# Patient Record
Sex: FEMALE | Race: WHITE | Employment: FULL TIME | URBAN - METROPOLITAN AREA
[De-identification: names, ages, dates, MRNs, and addresses within clinical notes are randomized per-mention and may not be internally consistent; named-entity substitution may affect disease eponyms.]

---

## 2017-02-21 ENCOUNTER — TRANSFERRED RECORDS (OUTPATIENT)
Dept: HEALTH INFORMATION MANAGEMENT | Facility: CLINIC | Age: 57
End: 2017-02-21

## 2017-11-13 NOTE — PROGRESS NOTES
SUBJECTIVE:   CC: Missy Kirkpatrick is an 57 year old woman who presents for preventive health visit.     Answers for HPI/ROS submitted by the patient on 2017   Annual Exam:  Getting at least 3 servings of Calcium per day:: Yes  Bi-annual eye exam:: NO  Dental care twice a year:: Yes  Sleep apnea or symptoms of sleep apnea:: None  Frequency of exercise:: 2-3 days/week  Taking medications regularly:: Not Applicable  Medication side effects:: Not applicable  Additional concerns today:: YES  PHQ-2 Score: 0  Duration of exercise:: 30-45 minutes              Today's PHQ-2 Score:   PHQ-2 (  [employer]) 2017 10/30/2015   Q1: Little interest or pleasure in doing things 0 0   Q2: Feeling down, depressed or hopeless 0 0   PHQ-2 Score 0 0   Q1: Little interest or pleasure in doing things Not at all -   Q2: Feeling down, depressed or hopeless Not at all -   PHQ-2 Score 0 -         Abuse: Current or Past(Physical, Sexual or Emotional)- No  Do you feel safe in your environment - Yes  Social History   Substance Use Topics     Smoking status: Never Smoker     Smokeless tobacco: Never Used      Comment: Nonsmoking household     Alcohol use Yes      Comment: Occasionally     The patient does not drink >3 drinks per day nor >7 drinks per week.    Reviewed orders with patient.  Reviewed health maintenance and updated orders accordingly - Yes    G 3 P 3   Patient's last menstrual period was 10/19/2012.     Fasting: No   Td: tdap        Status post benign hysterectomy. Health Maintenance and Surgical History updated.     HPV: NA          Cholesterol:   Lab Results   Component Value Date    CHOL 208 10/21/2015     Lab Results   Component Value Date    HDL 66 10/21/2015     Lab Results   Component Value Date     10/21/2015     Lab Results   Component Value Date    TRIG 80 10/21/2015     Lab Results   Component Value Date    CHOLHDLRATIO 3.2 10/21/2015         MM/15  Dexa:  NA     Flex/colo: 12/10    Seat  Belt: Yes    Sunscreen use: Yes   Calcium Intake: adeq  Health Care Directive: No  Sexually Active: Yes     Current contraception: hysterectomy  History of abnormal Pap smear: No  Family history of colon/breast/ovarian cancer: No  Regular self breast exam: Yes  History of abnormal mammogram: No      Labs reviewed in EPIC  BP Readings from Last 3 Encounters:   11/14/17 118/81   08/17/16 120/78   06/30/16 110/76    Wt Readings from Last 3 Encounters:   11/14/17 230 lb (104.3 kg)   08/17/16 217 lb (98.4 kg)   06/30/16 208 lb (94.3 kg)                  Patient Active Problem List   Diagnosis     CARDIOVASCULAR SCREENING; LDL GOAL LESS THAN 160     Female stress incontinence     Factor 5 Leiden mutation, heterozygous (H)     Multiple thyroid nodules     S/P partial thyroidectomy     Advanced directives, counseling/discussion     Right rotator cuff tendonitis     Complete tear of right rotator cuff     Past Surgical History:   Procedure Laterality Date     ABDOMEN SURGERY  December 2012    Hysterectomy     BIOPSY  November 2012    utererus     C ANESTH,CLEFT PALATE REPAIR       C ANESTH,ELBOW,NOS  01/76     C REMOVE BENIGN THYROID LESION  01/2002    nodule removed      C TOTAL ABDOM HYSTERECTOMY  12/14/2012    pathology benign, no further paps needed     COLONOSCOPY  October 2012     ENT SURGERY  February 1994    Thyroid issue     ORTHOPEDIC SURGERY  01/1976    [place]     TUBAL LIGATION         Social History   Substance Use Topics     Smoking status: Never Smoker     Smokeless tobacco: Never Used      Comment: Nonsmoking household     Alcohol use Yes      Comment: Occasionally     Family History   Problem Relation Age of Onset     CEREBROVASCULAR DISEASE Mother      Eye Disorder Mother      glaucoma     HEART DISEASE Mother      DIABETES Mother      Hypertension Mother      Blood Disease Brother      DVT     CANCER Brother      Blood Disease Brother      DVT     Blood Disease Brother      DVT     Blood Disease  "Brother      DVT     Blood Disease Son 27     blood clots     Other Cancer Brother      Lung Cancer     DIABETES Brother      Obesity Brother      Bariatric surgery     DIABETES Brother      Hypertension Brother      Other Cancer Brother      Pancreatic Cancer         Current Outpatient Prescriptions   Medication Sig Dispense Refill     MULTIVITAMIN TABS   OR 1 TABLET DAILY             Patient over age 50, mutual decision to screen reflected in health maintenance.      Pertinent mammograms are reviewed under the imaging tab.      Reviewed and updated as needed this visit by clinical staff  Tobacco  Allergies  Meds  Problems  Med Hx  Surg Hx  Fam Hx  Soc Hx          Reviewed and updated as needed this visit by Provider  Allergies  Meds  Problems            ROS:  C: NEGATIVE for fever, chills, change in weight  I: NEGATIVE for worrisome rashes, moles or lesions  E: NEGATIVE for vision changes or irritation  ENT: NEGATIVE for ear, mouth and throat problems  R: NEGATIVE for significant cough or SOB  B: NEGATIVE for masses, tenderness or discharge  CV: NEGATIVE for chest pain, palpitations or peripheral edema  GI: NEGATIVE for nausea, abdominal pain, heartburn, or change in bowel habits  : NEGATIVE for unusual urinary or vaginal symptoms. No vaginal bleeding.  M: NEGATIVE for significant arthralgias or myalgia  N: NEGATIVE for weakness, dizziness or paresthesias  P: NEGATIVE for changes in mood or affect     OBJECTIVE:   /81  Pulse 69  Temp 96.6  F (35.9  C) (Oral)  Ht 5' 7.5\" (1.715 m)  Wt 230 lb (104.3 kg)  LMP 10/19/2012  SpO2 97%  BMI 35.49 kg/m2  EXAM:  GENERAL APPEARANCE: healthy, alert and no distress  EYES: Eyes grossly normal to inspection, PERRL and conjunctivae and sclerae normal  HENT: ear canals and TM's normal, nose and mouth without ulcers or lesions, oropharynx clear and oral mucous membranes moist  NECK: no adenopathy, no asymmetry, masses, or scars and thyroid normal to " palpation  RESP: lungs clear to auscultation - no rales, rhonchi or wheezes  BREAST: normal without masses, tenderness or nipple discharge and no palpable axillary masses or adenopathy  CV: regular rate and rhythm, normal S1 S2, no S3 or S4, no murmur, click or rub, no peripheral edema and peripheral pulses strong  ABDOMEN: soft, nontender, no hepatosplenomegaly, no masses and bowel sounds normal   (female): normal female external genitalia, normal urethral meatus, vaginal mucosal atrophy noted, normal skin, no lesions  MS: no musculoskeletal defects are noted and gait is age appropriate without ataxia  SKIN: no suspicious lesions or rashes  NEURO: Normal strength and tone, sensory exam grossly normal, mentation intact and speech normal  PSYCH: mentation appears normal and affect normal/bright    ASSESSMENT/PLAN:   (Z00.00) Routine general medical examination at a health care facility  (primary encounter diagnosis)  Comment: preventive needs reviewed  Plan: see orders in Epic.     (Z12.31) Visit for screening mammogram  Comment: due  Plan: MA SCREENING DIGITAL BILAT - Future  (s+30)            (Z11.59) Need for hepatitis C screening test  Comment: due  Plan: Hepatitis C Screen Reflex to HCV RNA Quant and         Genotype            (D68.51) Factor 5 Leiden mutation, heterozygous (H)  Comment: stable  Plan: not on meds    (D22.9) Atypical mole  Comment: multiple  Plan: DERMATOLOGY REFERRAL        Refer to derm for evaluation    (E04.2) Multiple thyroid nodules  Comment: h/o partial thyroidectomy  Plan: TSH with free T4 reflex              COUNSELING:   Reviewed preventive health counseling, as reflected in patient instructions  Special attention given to:        Regular exercise       Healthy diet/nutrition    BP Screening:   Last 3 BP Readings:    BP Readings from Last 3 Encounters:   11/14/17 118/81   08/17/16 120/78   06/30/16 110/76       The following was recommended to the patient:  Re-screen BP within a year  "and recommended lifestyle modifications     reports that she has never smoked. She has never used smokeless tobacco.    Estimated body mass index is 35.49 kg/(m^2) as calculated from the following:    Height as of this encounter: 5' 7.5\" (1.715 m).    Weight as of this encounter: 230 lb (104.3 kg).   Weight management plan: Discussed healthy diet and exercise guidelines and patient will follow up in 12 months in clinic to re-evaluate.    Counseling Resources:  ATP IV Guidelines  Pooled Cohorts Equation Calculator  Breast Cancer Risk Calculator  FRAX Risk Assessment  ICSI Preventive Guidelines  Dietary Guidelines for Americans, 2010  USDA's MyPlate  ASA Prophylaxis  Lung CA Screening    Shell Osuna MD  [facility]  "

## 2017-11-14 ENCOUNTER — OFFICE VISIT (OUTPATIENT)
Dept: FAMILY MEDICINE | Facility: CLINIC | Age: 57
End: 2017-11-14
Payer: COMMERCIAL

## 2017-11-14 VITALS
SYSTOLIC BLOOD PRESSURE: 118 MMHG | DIASTOLIC BLOOD PRESSURE: 81 MMHG | HEART RATE: 69 BPM | BODY MASS INDEX: 34.86 KG/M2 | WEIGHT: 230 LBS | OXYGEN SATURATION: 97 % | HEIGHT: 68 IN | TEMPERATURE: 96.6 F

## 2017-11-14 DIAGNOSIS — D68.51 FACTOR 5 LEIDEN MUTATION, HETEROZYGOUS (H): ICD-10-CM

## 2017-11-14 DIAGNOSIS — Z00.00 ROUTINE GENERAL MEDICAL EXAMINATION AT A HEALTH CARE FACILITY: Primary | ICD-10-CM

## 2017-11-14 DIAGNOSIS — Z12.31 VISIT FOR SCREENING MAMMOGRAM: ICD-10-CM

## 2017-11-14 DIAGNOSIS — Z11.59 NEED FOR HEPATITIS C SCREENING TEST: ICD-10-CM

## 2017-11-14 DIAGNOSIS — E04.2 MULTIPLE THYROID NODULES: ICD-10-CM

## 2017-11-14 DIAGNOSIS — D22.9 ATYPICAL MOLE: ICD-10-CM

## 2017-11-14 LAB — TSH SERPL DL<=0.005 MIU/L-ACNC: 0.86 MU/L (ref 0.4–4)

## 2017-11-14 PROCEDURE — 86803 HEPATITIS C AB TEST: CPT | Performed by: FAMILY MEDICINE

## 2017-11-14 PROCEDURE — 84443 ASSAY THYROID STIM HORMONE: CPT | Performed by: FAMILY MEDICINE

## 2017-11-14 PROCEDURE — 99396 PREV VISIT EST AGE 40-64: CPT | Performed by: FAMILY MEDICINE

## 2017-11-14 PROCEDURE — 36415 COLL VENOUS BLD VENIPUNCTURE: CPT | Performed by: FAMILY MEDICINE

## 2017-11-14 NOTE — MR AVS SNAPSHOT
After Visit Summary   11/14/2017    Missy Kirkpatrick    MRN: [id number]           Patient Information     Date Of Birth          1960        Visit Information        Provider Department      11/14/2017 11:35 AM Shell Osuna MD [facility]        Today's Diagnoses     Routine general medical examination at a health care facility    -  1    Visit for screening mammogram        Need for hepatitis C screening test        Factor 5 Leiden mutation, heterozygous (H)        Atypical mole        Multiple thyroid nodules          Care Instructions      Preventive Health Recommendations  Female Ages 50 - 64    Yearly exam: See your health care provider every year in order to  o Review health changes.   o Discuss preventive care.    o Review your medicines if your doctor has prescribed any.      Get a Pap test every three years (unless you have an abnormal result and your provider advises testing more often).    If you get Pap tests with HPV test, you only need to test every 5 years, unless you have an abnormal result.     You do not need a Pap test if your uterus was removed (hysterectomy) and you have not had cancer.    You should be tested each year for STDs (sexually transmitted diseases) if you're at risk.     Have a mammogram every 1 to 2 years.    Have a colonoscopy at age 50, or have a yearly FIT test (stool test). These exams screen for colon cancer.      Have a cholesterol test every 5 years, or more often if advised.    Have a diabetes test (fasting glucose) every three years. If you are at risk for diabetes, you should have this test more often.     If you are at risk for osteoporosis (brittle bone disease), think about having a bone density scan (DEXA).    Shots: Get a flu shot each year. Get a tetanus shot every 10 years.    Nutrition:     Eat at least 5 servings of fruits and vegetables each day.    Eat whole-grain bread, whole-wheat pasta and brown rice instead of  white grains and rice.    Talk to your provider about Calcium and Vitamin D.     Lifestyle    Exercise at least 150 minutes a week (30 minutes a day, 5 days a week). This will help you control your weight and prevent disease.    Limit alcohol to one drink per day.    No smoking.     Wear sunscreen to prevent skin cancer.     See your dentist every six months for an exam and cleaning.    See your eye doctor every 1 to 2 years.            Follow-ups after your visit        Additional Services     DERMATOLOGY REFERRAL       Your provider has referred you to: [place]: [facility] [phone]   [email or web address]    Please be aware that coverage of these services is subject to the terms and limitations of your health insurance plan.  Call member services at your health plan with any benefit or coverage questions.      Please bring the following with you to your appointment:    (1) Any X-Rays, CTs or MRIs which have been performed.  Contact the facility where they were done to arrange for  prior to your scheduled appointment.    (2) List of current medications  (3) This referral request   (4) Any documents/labs given to you for this referral                  Future tests that were ordered for you today     Open Future Orders        Priority Expected Expires Ordered    MA SCREENING DIGITAL BILAT - Future  (s+30) Routine  11/13/2018 11/14/2017            Who to contact     If you have questions or need follow up information about today's clinic visit or your schedule please contact [facility] directly at [phone].  Normal or non-critical lab and imaging results will be communicated to you by MyChart, letter or phone within 4 business days after the clinic has received the results. If you do not hear from us within 7 days, please contact the clinic through MyChart or phone. If you have a critical or abnormal lab result, we will notify you by phone as  "soon as possible.  Submit refill requests through [employer] or call your pharmacy and they will forward the refill request to us. Please allow 3 business days for your refill to be completed.          Additional Information About Your Visit        Social[employer] Information     [employer] gives you secure access to your electronic health record. If you see a primary care provider, you can also send messages to your care team and make appointments. If you have questions, please call your primary care clinic.  If you do not have a primary care provider, please call [phone] and they will assist you.        Care EveryWhere ID     This is your Care EveryWhere ID. This could be used by other organizations to access your [place] medical records  [id number]        Your Vitals Were     Pulse Temperature Height Last Period Pulse Oximetry BMI (Body Mass Index)    69 96.6  F (35.9  C) (Oral) 5' 7.5\" (1.715 m) 10/19/2012 97% 35.49 kg/m2       Blood Pressure from Last 3 Encounters:   11/14/17 118/81   08/17/16 120/78   06/30/16 110/76    Weight from Last 3 Encounters:   11/14/17 230 lb (104.3 kg)   08/17/16 217 lb (98.4 kg)   06/30/16 208 lb (94.3 kg)              We Performed the Following     DERMATOLOGY REFERRAL     Hepatitis C Screen Reflex to HCV RNA Quant and Genotype     TSH with free T4 reflex        Primary Care Provider Office Phone # Fax #    Shell Adriane Osuna [phone] [address] [facility] [address]        Equal Access to Services     [facility] LAMINE : Hadii aad ku hadasho Soomaali, waaxda luqadaha, qaybta kaalmada adeegyada, raudel bradshaw. So [facility] [phone].    ATENCIÓN: Si habla chrisañol, tiene a mars disposición servicios gratuitos de asistencia lingüística. Llame al [phone].    We comply with applicable federal civil rights laws and [place] laws. We do not discriminate on the basis of race, color, national origin, age, disability, sex, sexual " orientation, or gender identity.            Thank you!     [facility] you [facility]  [facility] your [facility]. Our goal is always to provide you with excellent care. Hearing back from our patients is one way we can continue to improve our services. Please take a few minutes to complete the written survey that you may receive in the mail after your visit with us. Thank you!             Your Updated Medication List - Protect others around you: Learn how to safely use, store and throw away your medicines at www.disposemymeds.org.          This list is accurate as of: 11/14/17 12:00 PM.  Always use your most recent med list.                   Brand Name Dispense Instructions for use Diagnosis    MULTIVITAMIN TABS   OR      1 TABLET DAILY

## 2017-11-14 NOTE — NURSING NOTE
"Chief Complaint   Patient presents with     Physical       Initial /81  Pulse 69  Temp 96.6  F (35.9  C) (Oral)  Ht 5' 7.5\" (1.715 m)  Wt 230 lb (104.3 kg)  LMP 10/19/2012  SpO2 97%  BMI 35.49 kg/m2 Estimated body mass index is 35.49 kg/(m^2) as calculated from the following:    Height as of this encounter: 5' 7.5\" (1.715 m).    Weight as of this encounter: 230 lb (104.3 kg).  Medication Reconciliation: complete   Idalmis CHEN CMA (AAMA)      "

## 2017-11-15 LAB — HCV AB SERPL QL IA: NONREACTIVE

## 2017-12-05 ENCOUNTER — RADIANT APPOINTMENT (OUTPATIENT)
Dept: MAMMOGRAPHY | Facility: CLINIC | Age: 57
End: 2017-12-05
Attending: FAMILY MEDICINE
Payer: COMMERCIAL

## 2017-12-05 DIAGNOSIS — Z12.31 VISIT FOR SCREENING MAMMOGRAM: ICD-10-CM

## 2017-12-05 PROCEDURE — G0202 SCR MAMMO BI INCL CAD: HCPCS | Mod: TC

## 2018-03-20 ENCOUNTER — OFFICE VISIT (OUTPATIENT)
Dept: FAMILY MEDICINE | Facility: CLINIC | Age: 58
End: 2018-03-20
Payer: COMMERCIAL

## 2018-03-20 ENCOUNTER — RADIANT APPOINTMENT (OUTPATIENT)
Dept: GENERAL RADIOLOGY | Facility: CLINIC | Age: 58
End: 2018-03-20
Attending: FAMILY MEDICINE
Payer: COMMERCIAL

## 2018-03-20 ENCOUNTER — RADIANT APPOINTMENT (OUTPATIENT)
Dept: MRI IMAGING | Facility: CLINIC | Age: 58
End: 2018-03-20
Attending: FAMILY MEDICINE
Payer: COMMERCIAL

## 2018-03-20 VITALS
HEIGHT: 68 IN | OXYGEN SATURATION: 96 % | HEART RATE: 74 BPM | BODY MASS INDEX: 33.19 KG/M2 | SYSTOLIC BLOOD PRESSURE: 129 MMHG | TEMPERATURE: 96.7 F | DIASTOLIC BLOOD PRESSURE: 81 MMHG | RESPIRATION RATE: 14 BRPM | WEIGHT: 219 LBS

## 2018-03-20 DIAGNOSIS — M25.562 LEFT KNEE PAIN, UNSPECIFIED CHRONICITY: Primary | ICD-10-CM

## 2018-03-20 DIAGNOSIS — M25.562 LEFT KNEE PAIN, UNSPECIFIED CHRONICITY: ICD-10-CM

## 2018-03-20 DIAGNOSIS — D68.51 FACTOR 5 LEIDEN MUTATION, HETEROZYGOUS (H): ICD-10-CM

## 2018-03-20 PROCEDURE — 73562 X-RAY EXAM OF KNEE 3: CPT | Mod: LT

## 2018-03-20 PROCEDURE — 73721 MRI JNT OF LWR EXTRE W/O DYE: CPT | Mod: TC

## 2018-03-20 PROCEDURE — 99213 OFFICE O/P EST LOW 20 MIN: CPT | Performed by: FAMILY MEDICINE

## 2018-03-20 ASSESSMENT — PAIN SCALES - GENERAL: PAINLEVEL: MODERATE PAIN (5)

## 2018-03-20 NOTE — PROGRESS NOTES
SUBJECTIVE:   Missy Kirkpatrick is a 57 year old female who presents to clinic today for the following health issues:        Musculoskeletal problem/pain      Duration: 3-4 weeks     Description  Location: left knee    Intensity:  moderate    Accompanying signs and symptoms: swelling    History  Previous similar problem: no   Previous evaluation:  none    Precipitating or alleviating factors:  Trauma or overuse: no   Aggravating factors include: sitting, walking, climbing stairs and kneeling    Therapies tried and outcome: rest/inactivity, ice and Ibuprofen      No injury or triggering event but has significantly increased over past 3-4 weeks, has had knee pain x 6 months.  Feels tight, cannot fully bend or extend without pain.    Problem list and histories reviewed & adjusted, as indicated.  Additional history: as documented    Patient Active Problem List   Diagnosis     CARDIOVASCULAR SCREENING; LDL GOAL LESS THAN 160     Female stress incontinence     Factor 5 Leiden mutation, heterozygous (H)     Multiple thyroid nodules     S/P partial thyroidectomy     Advanced directives, counseling/discussion     Right rotator cuff tendonitis     Complete tear of right rotator cuff     Past Surgical History:   Procedure Laterality Date     ABDOMEN SURGERY  December 2012    Hysterectomy     BIOPSY  November 2012    utererus     C ANESTH,CLEFT PALATE REPAIR       C ANESTH,ELBOW,NOS  01/76     C REMOVE BENIGN THYROID LESION  01/2002    nodule removed      C TOTAL ABDOM HYSTERECTOMY  12/14/2012    pathology benign, no further paps needed     COLONOSCOPY  October 2012     ENT SURGERY  February 1994    Thyroid issue     ORTHOPEDIC SURGERY  01/1976    [place]     TUBAL LIGATION         Social History   Substance Use Topics     Smoking status: Never Smoker     Smokeless tobacco: Never Used      Comment: Nonsmoking household     Alcohol use Yes      Comment: Occasionally     Family History   Problem Relation Age of Onset      "CEREBROVASCULAR DISEASE Mother      Eye Disorder Mother      glaucoma     HEART DISEASE Mother      DIABETES Mother      Hypertension Mother      Blood Disease Brother      DVT     CANCER Brother      Blood Disease Brother      DVT     Blood Disease Brother      DVT     Blood Disease Brother      DVT     Blood Disease Son 27     blood clots     Other Cancer Brother      Lung Cancer     DIABETES Brother      Obesity Brother      Bariatric surgery     DIABETES Brother      Hypertension Brother      Other Cancer Brother      Pancreatic Cancer         Current Outpatient Prescriptions   Medication Sig Dispense Refill     MULTIVITAMIN TABS   OR 1 TABLET DAILY       BP Readings from Last 3 Encounters:   03/20/18 129/81   11/14/17 118/81   08/17/16 120/78    Wt Readings from Last 3 Encounters:   03/20/18 219 lb (99.3 kg)   11/14/17 230 lb (104.3 kg)   08/17/16 217 lb (98.4 kg)                  Labs reviewed in EPIC    Reviewed and updated as needed this visit by clinical staff  Tobacco  Allergies  Meds  Problems  Med Hx  Surg Hx  Fam Hx  Soc Hx        Reviewed and updated as needed this visit by Provider  Allergies  Meds  Problems         ROS:  Constitutional, HEENT, cardiovascular, pulmonary, gi and gu systems are negative, except as otherwise noted.    OBJECTIVE:     /81  Pulse 74  Temp 96.7  F (35.9  C) (Oral)  Resp 14  Ht 5' 8\" (1.727 m)  Wt 219 lb (99.3 kg)  LMP 10/19/2012  SpO2 96%  BMI 33.3 kg/m2  Body mass index is 33.3 kg/(m^2).  GENERAL: healthy, alert and no distress  EYES: Eyes grossly normal to inspection, PERRL and conjunctivae and sclerae normal  MS: extremities normal- no gross deformities noted, left knee with effusion, extension to only 140, flexion to 90, + [facility] medial joint line.  Gait abnormal due to limp from pain.  SKIN: no suspicious lesions or rashes  PSYCH: mentation appears normal, affect normal/bright    Diagnostic Test Results:  Xray left knee: joint space perserved, " + DJD patello/femoral compartment    ASSESSMENT/PLAN:     (M25.562) Left knee pain, unspecified chronicity  (primary encounter diagnosis)  Comment: likely meniscal damag3  Plan: MR Knee Left w/o Contrast, CANCELED: XR Knee         Standing Left 2 Views, CANCELED: XR Knee Left         1/2 Views        Refer for MRI, will send to ortho based on results    (D68.51) Factor 5 Leiden mutation, heterozygous (H)  Comment: no symptoms to suggest DVT  Plan: no anticoagulation needed    See Patient Instructions    Shell Osuna MD  [facility]

## 2018-03-20 NOTE — MR AVS SNAPSHOT
After Visit Summary   3/20/2018    Missy iKrkpatrick    MRN: [id number]           Patient Information     Date Of Birth          1960        Visit Information        Provider Department      3/20/2018 11:55 AM Shell Osuna MD [facility] [place]        Today's Diagnoses     Left knee pain, unspecified chronicity    -  1      Care Instructions      Please  call [phone] to schedule your MRI of left knee.           Follow-ups after your visit        Future tests that were ordered for you today     Open Future Orders        Priority Expected Expires Ordered    MR Knee Left w/o Contrast Routine  3/20/2019 3/20/2018            Who to contact     If you have questions or need follow up information about today's clinic visit or your schedule please contact [facility] directly at [phone].  Normal or non-critical lab and imaging results will be communicated to you by MyChart, letter or phone within 4 business days after the clinic has received the results. If you do not hear from us within 7 days, please contact the clinic through MyChart or phone. If you have a critical or abnormal lab result, we will notify you by phone as soon as possible.  Submit refill requests through [employer] or call your pharmacy and they will forward the refill request to us. Please allow 3 business days for your refill to be completed.          Additional Information About Your Visit        MyChart Information     [employer] gives you secure access to your electronic health record. If you see a primary care provider, you can also send messages to your care team and make appointments. If you have questions, please call your primary care clinic.  If you do not have a primary care provider, please call [phone] and they will assist you.        Care EveryWhere ID     This is your Care EveryWhere ID. This could be used by other organizations to access your [place] medical records  [id number]    "     Your Vitals Were     Pulse Temperature Respirations Height Last Period Pulse Oximetry    74 96.7  F (35.9  C) (Oral) 14 5' 8\" (1.727 m) 10/19/2012 96%    BMI (Body Mass Index)                   33.3 kg/m2            Blood Pressure from Last 3 Encounters:   03/20/18 129/81   11/14/17 118/81   08/17/16 120/78    Weight from Last 3 Encounters:   03/20/18 219 lb (99.3 kg)   11/14/17 230 lb (104.3 kg)   08/17/16 217 lb (98.4 kg)               Primary Care Provider Office Phone # Fax #    Shell Adriane Osuna [phone] [id number] [facility] [address]        Equal Access to Services     ENRIQUE RAMAN : Hadii aad ku hadasho Soomaali, waaxda luqadaha, qaybta kaalmada adeegyada, raudel oh . So [facility] [phone].    ATENCIÓN: Si habla español, tiene a mars disposición servicios gratuitos de asistencia lingüística. [facility] [phone].    We comply with applicable federal civil rights laws and [place] laws. We do not discriminate on the basis of race, color, national origin, age, disability, sex, sexual orientation, or gender identity.            Thank you!     Thank you for choosing [facility]  for your care. Our goal is always to provide you with excellent care. Hearing back from our patients is one way we can continue to improve our services. Please take a few minutes to complete the written survey that you may receive in the mail after your visit with us. Thank you!             Your Updated Medication List - Protect others around you: Learn how to safely use, store and throw away your medicines at www.disposemymeds.org.          This list is accurate as of 3/20/18 12:42 PM.  Always use your most recent med list.                   Brand Name Dispense Instructions for use Diagnosis    MULTIVITAMIN TABS   OR      1 TABLET DAILY          "

## 2018-03-20 NOTE — NURSING NOTE
"Chief Complaint   Patient presents with     Knee Pain     3 - 4 weeks left knee       Initial /81  Pulse 74  Temp 96.7  F (35.9  C) (Oral)  Resp 14  Ht 5' 8\" (1.727 m)  Wt 219 lb (99.3 kg)  LMP 10/19/2012  SpO2 96%  BMI 33.3 kg/m2 Estimated body mass index is 33.3 kg/(m^2) as calculated from the following:    Height as of this encounter: 5' 8\" (1.727 m).    Weight as of this encounter: 219 lb (99.3 kg).  Medication Reconciliation: complete  Josette Lombardo M.A.    "

## 2018-03-28 NOTE — PROGRESS NOTES
HISTORY OF PRESENT ILLNESS    Missy Kirkpatrick is a 57 year old female seen at the request of Shell Osuna MD for evaluation of left knee pain that started 2 months ago.  She is unsure if it started  while doing yoga.  She remembers feeling a subtle pop in her knee while doing yoga.   Present symptoms: She has pain sitting and driving.  Pain walking through the snow.  Pain with volleyball particularly after a strenuous volleyball game.  Getting in and out of chairs is painful.  Sitting for long periods is painful.  She reports swelling in the knee and gets locking occasionally moving around in bed at night.  The pain is medial usually    Orthopedic PMH: none    Other PMH: See patient history on [facility].  Past Medical History:   Diagnosis Date     Arthritis      Factor 5 Leiden mutation, heterozygous (H)      Factor 5 Leiden mutation, heterozygous (H)      Factor 5 Leiden mutation, heterozygous (H)      Hyperlipidemia LDL goal < 100      Morbid obesity (H)      Thyroid disease February 1994    Precancerous Thyroid - 1/2 removed     Past Surgical History:   Procedure Laterality Date     ABDOMEN SURGERY  December 2012    Hysterectomy     BIOPSY  November 2012    utererus     C ANESTH,CLEFT PALATE REPAIR       C ANESTH,ELBOW,NOS  01/76     C REMOVE BENIGN THYROID LESION  01/2002    nodule removed      C TOTAL ABDOM HYSTERECTOMY  12/14/2012    pathology benign, no further paps needed     COLONOSCOPY  October 2012     ENT SURGERY  February 1994    Thyroid issue     ORTHOPEDIC SURGERY  01/1976    Broken [place]     TUBAL LIGATION       Family History   Problem Relation Age of Onset     CEREBROVASCULAR DISEASE Mother      Eye Disorder Mother      glaucoma     HEART DISEASE Mother      DIABETES Mother      Hypertension Mother      Blood Disease Brother      DVT     CANCER Brother      Blood Disease Brother      DVT     Blood Disease Brother      DVT     Blood Disease Brother      DVT     Blood Disease Son 27     blood  clots     Other Cancer Brother      Lung Cancer     DIABETES Brother      Obesity Brother      Bariatric surgery     DIABETES Brother      Hypertension Brother      Other Cancer Brother      Pancreatic Cancer     Social History     Social History     Marital status:      Spouse name: N/A     Number of children: N/A     Years of education: N/A     Occupational History     Not on file.     Social History Main Topics     Smoking status: Never Smoker     Smokeless tobacco: Never Used      Comment: Nonsmoking household     Alcohol use Yes      Comment: Occasionally     Drug use: No     Sexual activity: Yes     Partners: Male     Birth control/ protection: Female Surgical      Comment: tubal ligation     Other Topics Concern     Parent/Sibling W/ Cabg, Mi Or Angioplasty Before 65f 55m? Yes     Brother     Social History Narrative        REVIEW OF SYSTEMS:    CONSTITUTIONAL:  NEGATIVE for fever, chills, change in weight  INTEGUMENTARY/SKIN:  NEGATIVE for worrisome rashes, moles or lesions  EYES:  NEGATIVE for vision changes or irritation  ENT/MOUTH:  NEGATIVE for ear, mouth and throat problems  RESP:  NEGATIVE for significant cough or SOB  BREAST:  NEGATIVE for masses, tenderness or discharge  CV:  NEGATIVE for chest pain, palpitations or peripheral edema  GI:  NEGATIVE for nausea, abdominal pain, heartburn, or change in bowel habits  :  Negative   MUSCULOSKELETAL:  See HPI above  NEURO:  NEGATIVE for weakness, dizziness or paresthesias  ENDOCRINE:  NEGATIVE for temperature intolerance, skin/hair changes  HEME/ALLERGY/IMMUNE:  NEGATIVE for bleeding problems  PSYCHIATRIC:  NEGATIVE for changes in mood or affect      PHYSICAL EXAM:    GENERAL APPEARANCE: healthy, alert and no distress   SKIN: no suspicious lesions or rashes  NEURO: Normal strength and tone, mentation intact and speech normal  PSYCH:  mentation appears normal and affect normal/bright  RESP: No increased work of breathing  LYMPH:  No  lymphedema  PULSES:  Intact.    KNEE EXAM:   Gait: walks with normal gait  Alignment: Normal  Squat: 50 % limited by pain.    mild crepitations in the patellofemoral joint.  Tender: medial joint line and medial facet of the patella  ROM: 0-110*  Effusion: mild    McMurrays: negative  Ligaments:    Lachman's Stable, Anterior and posterior drawer stable, stable to varus and valgus stress.  no pain with Varus/Valgus stress testing.    Patellofemoral joint:      Lateral retinaculum is not tight   Facet Tenderness: medial   Apprehension: negative    X-RAY:  From 3/20/2018: Significant loss of patellofemoral compartment joint space,  increased in comparison with 2015. Tibiofemoral compartment joint  space is preserved with minimal medial joint space narrowing.. Small patellofemoral marginal osteophytes    MRI: shows 1. Tearing of the posterior horn medial meniscus with mild  perimeniscal cyst formation.  2. Medial compartment and patellofemoral chondromalacia.  3. Mild-moderate effusion.  4. Minimal semimembranosus bursitis.she      Impression: medial meniscus tear some questionable mechanical symptoms.  Negative mechanical signs on exam.  Osteoarthritis mainly involving the patellofemoral joint.      Plan: We discussed the options which were basically knee arthroscopy versus steroid injection.  She elected to try a steroid injection after long discussion about the pros and cons of each.  In the arthroscopy could be done if the injection is not particularly successful in helping her symptoms.  With the patient's consent, left knee(s) injected intra-articularly with 80mg of Depomedrol and 4cc of local anesthetic after sterile prep.    Return to clinic as needed       APRIL Faustin MD  Dept. Orthopedic Surgery  [facility]

## 2018-04-02 ENCOUNTER — OFFICE VISIT (OUTPATIENT)
Dept: ORTHOPEDICS | Facility: CLINIC | Age: 58
End: 2018-04-02
Payer: COMMERCIAL

## 2018-04-02 VITALS
HEART RATE: 62 BPM | WEIGHT: 219 LBS | BODY MASS INDEX: 33.3 KG/M2 | DIASTOLIC BLOOD PRESSURE: 74 MMHG | SYSTOLIC BLOOD PRESSURE: 104 MMHG

## 2018-04-02 DIAGNOSIS — M17.12 PRIMARY OSTEOARTHRITIS OF LEFT KNEE: ICD-10-CM

## 2018-04-02 DIAGNOSIS — S83.242A TEAR OF MEDIAL MENISCUS OF LEFT KNEE, CURRENT, UNSPECIFIED TEAR TYPE, INITIAL ENCOUNTER: Primary | ICD-10-CM

## 2018-04-02 PROCEDURE — 99213 OFFICE O/P EST LOW 20 MIN: CPT | Mod: 25 | Performed by: ORTHOPAEDIC SURGERY

## 2018-04-02 PROCEDURE — 20610 DRAIN/INJ JOINT/BURSA W/O US: CPT | Mod: LT | Performed by: ORTHOPAEDIC SURGERY

## 2018-04-02 NOTE — LETTER
4/2/2018         RE: Missy Kirkpatrick  [address]  [place] [address]        Dear Colleague,    Thank you for referring your patient, Missy Kirkpatrick, to the [facility]. Please see a copy of my visit note below.    HISTORY OF PRESENT ILLNESS    Missy Kirkpatrick is a 57 year old female seen at the request of Shell Osuna MD for evaluation of left knee pain that started 2 months ago.  She is unsure if it started  while doing yoga.  She remembers feeling a subtle pop in her knee while doing yoga.   Present symptoms: She has pain sitting and driving.  Pain walking through the snow.  Pain with volleyball particularly after a strenuous volleyball game.  Getting in and out of chairs is painful.  Sitting for long periods is painful.  She reports swelling in the knee and gets locking occasionally moving around in bed at night.  The pain is medial usually    Orthopedic PMH: none    Other PMH: See patient history on [facility].  Past Medical History:   Diagnosis Date     Arthritis      Factor 5 Leiden mutation, heterozygous (H)      Factor 5 Leiden mutation, heterozygous (H)      Factor 5 Leiden mutation, heterozygous (H)      Hyperlipidemia LDL goal < 100      Morbid obesity (H)      Thyroid disease February 1994    Precancerous Thyroid - 1/2 removed     Past Surgical History:   Procedure Laterality Date     ABDOMEN SURGERY  December 2012    Hysterectomy     BIOPSY  November 2012    utererus     C ANESTH,CLEFT PALATE REPAIR       C ANESTH,ELBOW,NOS  01/76     C REMOVE BENIGN THYROID LESION  01/2002    nodule removed      C TOTAL ABDOM HYSTERECTOMY  12/14/2012    pathology benign, no further paps needed     COLONOSCOPY  October 2012     ENT SURGERY  February 1994    Thyroid issue     ORTHOPEDIC SURGERY  01/1976    Bro[place]     TUBAL LIGATION       Family History   Problem Relation Age of Onset     CEREBROVASCULAR DISEASE Mother      Eye Disorder Mother      glaucoma     HEART DISEASE  Mother      DIABETES Mother      Hypertension Mother      Blood Disease Brother      DVT     CANCER Brother      Blood Disease Brother      DVT     Blood Disease Brother      DVT     Blood Disease Brother      DVT     Blood Disease Son 27     blood clots     Other Cancer Brother      Lung Cancer     DIABETES Brother      Obesity Brother      Bariatric surgery     DIABETES Brother      Hypertension Brother      Other Cancer Brother      Pancreatic Cancer     Social History     Social History     Marital status:      Spouse name: N/A     Number of children: N/A     Years of education: N/A     Occupational History     Not on file.     Social History Main Topics     Smoking status: Never Smoker     Smokeless tobacco: Never Used      Comment: Nonsmoking household     Alcohol use Yes      Comment: Occasionally     Drug use: No     Sexual activity: Yes     Partners: Male     Birth control/ protection: Female Surgical      Comment: tubal ligation     Other Topics Concern     Parent/Sibling W/ Cabg, Mi Or Angioplasty Before 65f 55m? Yes     Brother     Social History Narrative        REVIEW OF SYSTEMS:    CONSTITUTIONAL:  NEGATIVE for fever, chills, change in weight  INTEGUMENTARY/SKIN:  NEGATIVE for worrisome rashes, moles or lesions  EYES:  NEGATIVE for vision changes or irritation  ENT/MOUTH:  NEGATIVE for ear, mouth and throat problems  RESP:  NEGATIVE for significant cough or SOB  BREAST:  NEGATIVE for masses, tenderness or discharge  CV:  NEGATIVE for chest pain, palpitations or peripheral edema  GI:  NEGATIVE for nausea, abdominal pain, heartburn, or change in bowel habits  :  Negative   MUSCULOSKELETAL:  See HPI above  NEURO:  NEGATIVE for weakness, dizziness or paresthesias  ENDOCRINE:  NEGATIVE for temperature intolerance, skin/hair changes  HEME/ALLERGY/IMMUNE:  NEGATIVE for bleeding problems  PSYCHIATRIC:  NEGATIVE for changes in mood or affect      PHYSICAL EXAM:    GENERAL APPEARANCE: healthy, alert  and no distress   SKIN: no suspicious lesions or rashes  NEURO: Normal strength and tone, mentation intact and speech normal  PSYCH:  mentation appears normal and affect normal/bright  RESP: No increased work of breathing  LYMPH:  No lymphedema  PULSES:  Intact.    KNEE EXAM:   Gait: walks with normal gait  Alignment: Normal  Squat: 50 % limited by pain.    mild crepitations in the patellofemoral joint.  Tender: medial joint line and medial facet of the patella  ROM: 0-110*  Effusion: mild    McMurrays: negative  Ligaments:    Lachman's Stable, Anterior and posterior drawer stable, stable to varus and valgus stress.  no pain with Varus/Valgus stress testing.    Patellofemoral joint:      Lateral retinaculum is not tight   Facet Tenderness: medial   Apprehension: negative    X-RAY:  From 3/20/2018: Significant loss of patellofemoral compartment joint space,  increased in comparison with 2015. Tibiofemoral compartment joint  space is preserved with minimal medial joint space narrowing.. Small patellofemoral marginal osteophytes    MRI: shows 1. Tearing of the posterior horn medial meniscus with mild  perimeniscal cyst formation.  2. Medial compartment and patellofemoral chondromalacia.  3. Mild-moderate effusion.  4. Minimal semimembranosus bursitis.she      Impression: medial meniscus tear some questionable mechanical symptoms.  Negative mechanical signs on exam.  Osteoarthritis mainly involving the patellofemoral joint.      Plan: We discussed the options which were basically knee arthroscopy versus steroid injection.  She elected to try a steroid injection after long discussion about the pros and cons of each.  In the arthroscopy could be done if the injection is not particularly successful in helping her symptoms.  With the patient's consent, left knee(s) injected intra-articularly with 80mg of Depomedrol and 4cc of local anesthetic after sterile prep.    Return to clinic as needed       APRIL Faustin  MD  Dept. Orthopedic Surgery  [facility]      Again, thank you for allowing me to participate in the care of your patient.        Sincerely,        Pavan Faustin MD

## 2018-04-02 NOTE — MR AVS SNAPSHOT
After Visit Summary   4/2/2018    Missy Kirkpatrick    MRN: [id number]           Patient Information     Date Of Birth          1960        Visit Information        Provider Department      4/2/2018 10:00 AM Pavan Faustin MD [facility]        Today's Diagnoses     Tear of medial meniscus of left knee, current, unspecified tear type, initial encounter    -  1    Primary osteoarthritis of left knee           Follow-ups after your visit        Your next 10 appointments already scheduled     Apr 05, 2018  1:00 PM CDT   Return Visit with Pavan Faustin MD   [facility] ([facility])    [address] [facility] [address] [phone]              Who to contact     If you have questions or need follow up information about today's clinic visit or your schedule please contact [facility] directly at [phone].  Normal or non-critical lab and imaging results will be communicated to you by MyChart, letter or phone within 4 business days after the clinic has received the results. If you do not hear from us within 7 days, please contact the clinic through [employer] or phone. If you have a critical or abnormal lab result, we will notify you by phone as soon as possible.  Submit refill requests through [employer] or call your pharmacy and they will forward the refill request to us. Please allow 3 business days for your refill to be completed.          Additional Information About Your Visit        MyChart Information     MyForce gives you secure access to your electronic health record. If you see a primary care provider, you can also send messages to your care team and make appointments. If you have questions, please call your primary care clinic.  If you do not have a primary care provider, please call [phone] and they will assist you.        Care EveryWhere ID     This is your Care EveryWhere ID. This could be used by other  organizations to access your [facility] medical records  [id number]        Your Vitals Were     Pulse Last Period BMI (Body Mass Index)             62 10/19/2012 33.3 kg/m2          Blood Pressure from Last 3 Encounters:   04/02/18 104/74   03/20/18 129/81   11/14/17 118/81    Weight from Last 3 Encounters:   04/02/18 99.3 kg (219 lb)   03/20/18 99.3 kg (219 lb)   11/14/17 104.3 kg (230 lb)              Today, you had the following     No orders found for display       Primary Care Provider Office Phone # Fax #    Shell Adriane Osuna [phone] [address] [address] [address]        Equal Access to Services     ENRIQUE RAMAN : Hadii aad ku hadasho Soomaali, waaxda luqadaha, qaybta kaalmada adeegyada, raudel oh . So [facility] [phone].    ATENCIÓN: Si habla español, tiene a mars disposición servicios gratuitos de asistencia lingüística. [facility] [phone].    We comply with applicable federal civil rights laws and [place] laws. We do not discriminate on the basis of race, color, national origin, age, disability, sex, sexual orientation, or gender identity.            Thank you!     Thank you for choosing [facility]  for your care. Our goal is always to provide you with excellent care. Hearing back from our patients is one way we can continue to improve our services. Please take a few minutes to complete the written survey that you may receive in the mail after your visit with us. Thank you!             Your Updated Medication List - Protect others around you: Learn how to safely use, store and throw away your medicines at www.disposemymeds.org.          This list is accurate as of 4/2/18 11:59 PM.  Always use your most recent med list.                   Brand Name Dispense Instructions for use Diagnosis    MULTIVITAMIN TABS   OR      1 TABLET DAILY

## 2018-04-02 NOTE — NURSING NOTE
"Chief Complaint   Patient presents with     Knee Pain     Left knee        Initial /74 (BP Location: Left arm, Patient Position: Sitting, Cuff Size: Adult Large)  Pulse 62  Wt 99.3 kg (219 lb)  LMP 10/19/2012  BMI 33.3 kg/m2 Estimated body mass index is 33.3 kg/(m^2) as calculated from the following:    Height as of 3/20/18: 1.727 m (5' 8\").    Weight as of this encounter: 99.3 kg (219 lb).  Medication Reconciliation: complete  "

## 2018-04-02 NOTE — NURSING NOTE
"A steroid and or Hylan G - F 20 injection was performed on 4/2/2018 at 11:15 AM. Risks,benefits and complications of the injection were discussed with the patient and the patient has elected to proceed after verbal consent. Using sterile technique, the area was prepped with betadine . A 22 Gauge 1.5\" was used to inject the medication(s) listed below.This was well tolerated. No apparent complications. . Did also discuss that if diabetic, recommend close monitoring of blood sugars over the next week as cortisone injections can temporarily elevate blood sugar.    The following medication(s) was given:     MEDICATION: Depo Medrol 80mg per mL  ROUTE: intraarticular  SITE:Left Knee   : iiMonde (Depo-Medrol)  DOSE: 1 ml  LOT #: [id number]  EXPIRATION DATE:  6/2020    MEDICATION: Lidocaine HCL  1% per mL  : Hospira (Marcaine,Lidoncaine)  DOSE: 4 ml  LOT #: [id number]  EXPIRATION DATE:  1 oct 2019    Jorge BRYANT    "

## 2018-11-15 ENCOUNTER — OFFICE VISIT (OUTPATIENT)
Dept: DERMATOLOGY | Facility: CLINIC | Age: 58
End: 2018-11-15
Payer: COMMERCIAL

## 2018-11-15 VITALS — DIASTOLIC BLOOD PRESSURE: 83 MMHG | HEART RATE: 70 BPM | SYSTOLIC BLOOD PRESSURE: 123 MMHG | OXYGEN SATURATION: 97 %

## 2018-11-15 DIAGNOSIS — D18.01 CHERRY ANGIOMA: ICD-10-CM

## 2018-11-15 DIAGNOSIS — L82.1 SEBORRHEIC KERATOSIS: ICD-10-CM

## 2018-11-15 DIAGNOSIS — L98.8 AGE-RELATED FACIAL WRINKLES: ICD-10-CM

## 2018-11-15 DIAGNOSIS — D22.9 MULTIPLE BENIGN NEVI: ICD-10-CM

## 2018-11-15 DIAGNOSIS — L81.4 LENTIGO: ICD-10-CM

## 2018-11-15 DIAGNOSIS — D48.5 NEOPLASM OF UNCERTAIN BEHAVIOR OF SKIN: Primary | ICD-10-CM

## 2018-11-15 PROCEDURE — 11100 HC BIOPSY SKIN/SUBQ/MUC MEM, SINGLE LESION: CPT | Performed by: PHYSICIAN ASSISTANT

## 2018-11-15 PROCEDURE — 88305 TISSUE EXAM BY PATHOLOGIST: CPT | Mod: TC | Performed by: PHYSICIAN ASSISTANT

## 2018-11-15 PROCEDURE — 99203 OFFICE O/P NEW LOW 30 MIN: CPT | Mod: 25 | Performed by: PHYSICIAN ASSISTANT

## 2018-11-15 RX ORDER — TRETINOIN 0.5 MG/G
CREAM TOPICAL
Qty: 45 G | Refills: 11 | Status: SHIPPED | OUTPATIENT
Start: 2018-11-15 | End: 2020-02-28

## 2018-11-15 NOTE — NURSING NOTE
"Initial /83  Pulse 70  LMP 10/19/2012  SpO2 97% Estimated body mass index is 33.3 kg/(m^2) as calculated from the following:    Height as of 3/20/18: 1.727 m (5' 8\").    Weight as of 4/2/18: 99.3 kg (219 lb). .    Hillary Barrett LPN    "

## 2018-11-15 NOTE — PATIENT INSTRUCTIONS
Wound Care Instructions     FOR SUPERFICIAL WOUNDS     [facility] [phone]    [facility] [phone]  Left upper back                       AFTER 24 HOURS YOU SHOULD REMOVE THE BANDAGE AND BEGIN DAILY DRESSING CHANGES AS FOLLOWS:     1) Remove Dressing.     2) Clean and dry the area with tap water using a Q-tip or sterile gauze pad.     3) Apply Vaseline, Aquaphor, Polysporin ointment or Bacitracin ointment over entire wound.  Do NOT use Neosporin ointment.     4) Cover the wound with a band-aid, or a sterile non-stick gauze pad and micropore paper tape      REPEAT THESE INSTRUCTIONS AT LEAST ONCE A DAY UNTIL THE WOUND HAS COMPLETELY HEALED.    It is an old wives tale that a wound heals better when it is exposed to air and allowed to dry out. The wound will heal faster with a better cosmetic result if it is kept moist with ointment and covered with a bandage.    **Do not let the wound dry out.**      Supplies Needed:      *Cotton tipped applicators (Q-tips)    *Polysporin Ointment or Bacitracin Ointment (NOT NEOSPORIN)    *Band-aids or non-stick gauze pads and micropore paper tape.      PATIENT INFORMATION:    During the healing process you will notice a number of changes. All wounds develop a small halo of redness surrounding the wound.  This means healing is occurring. Severe itching with extensive redness usually indicates sensitivity to the ointment or bandage tape used to dress the wound.  You should call our office if this develops.      Swelling  and/or discoloration around your surgical site is common, particularly when performed around the eye.    All wounds normally drain.  The larger the wound the more drainage there will be.  After 7-10 days, you will notice the wound beginning to shrink and new skin will begin to grow.  The wound is healed when you can see skin has formed over the entire area.  A healed wound has a healthy, shiny look to the surface and is red to  dark pink in color to normalize.  Wounds may take approximately 4-6 weeks to heal.  Larger wounds may take 6-8 weeks.  After the wound is healed you may discontinue dressing changes.    You may experience a sensation of tightness as your wound heals. This is normal and will gradually subside.    Your healed wound may be sensitive to temperature changes. This sensitivity improves with time, but if you re having a lot of discomfort, try to avoid temperature extremes.    Patients frequently experience itching after their wound appears to have healed because of the continue healing under the skin.  Plain Vaseline will help relieve the itching.        POSSIBLE COMPLICATIONS    BLEEDIN. Leave the bandage in place.  2. Use tightly rolled up gauze or a cloth to apply direct pressure over the bandage for 30  minutes.  3. Reapply pressure for an additional 30 minutes if necessary  4. Use additional gauze and tape to maintain pressure once the bleeding has stopped.

## 2018-11-15 NOTE — MR AVS SNAPSHOT
After Visit Summary   11/15/2018    Missy Kirkpatrick    MRN: [id number]           Patient Information     Date Of Birth          1960        Visit Information        Provider Department      11/15/2018 10:40 AM Anisa Reynaga PA-C [facility]        Today's Diagnoses     Neoplasm of uncertain behavior of skin    -  1      Care Instructions          Wound Care Instructions     FOR SUPERFICIAL WOUNDS     [facility] [phone]    [facility] [phone]  Left upper back                       AFTER 24 HOURS YOU SHOULD REMOVE THE BANDAGE AND BEGIN DAILY DRESSING CHANGES AS FOLLOWS:     1) Remove Dressing.     2) Clean and dry the area with tap water using a Q-tip or sterile gauze pad.     3) Apply Vaseline, Aquaphor, Polysporin ointment or Bacitracin ointment over entire wound.  Do NOT use Neosporin ointment.     4) Cover the wound with a band-aid, or a sterile non-stick gauze pad and micropore paper tape      REPEAT THESE INSTRUCTIONS AT LEAST ONCE A DAY UNTIL THE WOUND HAS COMPLETELY HEALED.    It is an old wives tale that a wound heals better when it is exposed to air and allowed to dry out. The wound will heal faster with a better cosmetic result if it is kept moist with ointment and covered with a bandage.    **Do not let the wound dry out.**      Supplies Needed:      *Cotton tipped applicators (Q-tips)    *Polysporin Ointment or Bacitracin Ointment (NOT NEOSPORIN)    *Band-aids or non-stick gauze pads and micropore paper tape.      PATIENT INFORMATION:    During the healing process you will notice a number of changes. All wounds develop a small halo of redness surrounding the wound.  This means healing is occurring. Severe itching with extensive redness usually indicates sensitivity to the ointment or bandage tape used to dress the wound.  You should call our office if this develops.      Swelling  and/or discoloration around your surgical  site is common, particularly when performed around the eye.    All wounds normally drain.  The larger the wound the more drainage there will be.  After 7-10 days, you will notice the wound beginning to shrink and new skin will begin to grow.  The wound is healed when you can see skin has formed over the entire area.  A healed wound has a healthy, shiny look to the surface and is red to dark pink in color to normalize.  Wounds may take approximately 4-6 weeks to heal.  Larger wounds may take 6-8 weeks.  After the wound is healed you may discontinue dressing changes.    You may experience a sensation of tightness as your wound heals. This is normal and will gradually subside.    Your healed wound may be sensitive to temperature changes. This sensitivity improves with time, but if you re having a lot of discomfort, try to avoid temperature extremes.    Patients frequently experience itching after their wound appears to have healed because of the continue healing under the skin.  Plain Vaseline will help relieve the itching.        POSSIBLE COMPLICATIONS    BLEEDIN. Leave the bandage in place.  2. Use tightly rolled up gauze or a cloth to apply direct pressure over the bandage for 30  minutes.  3. Reapply pressure for an additional 30 minutes if necessary  4. Use additional gauze and tape to maintain pressure once the bleeding has stopped.            Follow-ups after your visit        Your next 10 appointments already scheduled     2018 11:00 AM CST   [facility]    [address] [facility] [address] [phone]           Bring a current list of meds and any records pertaining to this visit. For Physicals, please bring immunization records and any forms needing to be filled out. Please arrive 10 minutes early to complete paperwork.              Who to contact     If you have questions or need follow up  information about today's clinic visit or your schedule please contact [facility] directly at [phone].  Normal or non-critical lab and imaging results will be communicated to you by MyChart, letter or phone within 4 business days after the clinic has received the results. If you do not hear from us within 7 days, please contact the clinic through MyChart or phone. If you have a critical or abnormal lab result, we will notify you by phone as soon as possible.  Submit refill requests through [employer] or call your pharmacy and they will forward the refill request to us. Please allow 3 business days for your refill to be completed.          Additional Information About Your Visit        [employer] Information     [employer] gives you secure access to your electronic health record. If you see a primary care provider, you can also send messages to your care team and make appointments. If you have questions, please call your primary care clinic.  If you do not have a primary care provider, please call [phone] and they will assist you.        Care EveryWhere ID     This is your Care EveryWhere ID. This could be used by other organizations to access your [place] medical records  [id number]        Your Vitals Were     Pulse Last Period Pulse Oximetry             70 10/19/2012 97%          Blood Pressure from Last 3 Encounters:   11/15/18 123/83   04/02/18 104/74   03/20/18 129/81    Weight from Last 3 Encounters:   04/02/18 99.3 kg (219 lb)   03/20/18 99.3 kg (219 lb)   11/14/17 104.3 kg (230 lb)              We Performed the Following     Dermatological path order and indications        Primary Care Provider Office Phone # Fax #    Shell Osuna [phone] [address] [facility] [address]        Equal Access to Services     ENRIQUE RAMAN : Jose Elias Johnson, bud diaz, marlon barragan, raudel bradshaw. So [facility]  [phone].    ATENCIÓN: Si aaron mattson, tiene a mars disposición servicios gratuitos de asistencia lingüística. Josephine al [phone].    We comply with applicable federal civil rights laws and [place] laws. We do not discriminate on the basis of race, color, national origin, age, disability, sex, sexual orientation, or gender identity.            Thank you!     Thank you for choosing [facility]  for your care. Our goal is always to provide you with excellent care. Hearing back from our patients is one way we can continue to improve our services. Please take a few minutes to complete the written survey that you may receive in the mail after your visit with us. Thank you!             Your Updated Medication List - Protect others around you: Learn how to safely use, store and throw away your medicines at www.disposemymeds.org.          This list is accurate as of 11/15/18 11:14 AM.  Always use your most recent med list.                   Brand Name Dispense Instructions for use Diagnosis    MULTIVITAMIN TABS   OR      1 TABLET DAILY

## 2018-11-15 NOTE — LETTER
[facility]  [address]  [facility] [address]  Phone: [phone]    November 26, 2018    Missy Kirkpatrick                                                                                                        [address]  [place] [address]            Dear Ms. Kirkpatrick,    You are scheduled for Mohs Surgery on Wednesday January 16 th at 8:00 am.    Please check in at Dermatology Clinic.   (2nd Floor, last  Clinic on right up staircase or elevator -past OB/GYN clinic)    You don't need to arrive more than 5-10 minutes prior to your appointment time.     Be sure to eat a good breakfast and bathe and wash your hair prior to Surgery.    If you are taking any anti-coagulants that are prescribed by your Doctor (such as Coumadin/warfarin, Plavix, Aspirin, Ibuprofen), please continue taking them.     However, If you are taking anti-coagulants over the counter without  a Doctor's order for a Medical condition, please discontinue them 10 days prior to Surgery.      Please wear loose comfortable clothing as it could possibly be 4-6 hours until your surgery is completed depending upon how many layers of tissue need to be removed.     Wi-fi access is available.     Thank you,      Sal Perez MD/ Nissa Jordan RN

## 2018-11-15 NOTE — LETTER
11/15/2018         RE: Missy Kirkpatrick  [address]  [place] [address]        Dear Colleague,    Thank you for referring your patient, Missy Kirkpatrick, to the [facility]. Please see a copy of my visit note below.    Missy Kirkpatrick is a 58 year old year old female patient here today for skin check.  Patient reports that she has had a lot of sun exposure. She reports she has only had one sunburn as a teenager but otherwise her skin will typically tan. She does use sunscreen.  Patient has no other skin complaints today.  Remainder of the HPI, Meds, PMH, Allergies, FH, and SH was reviewed in chart.    Pertinent Hx:   No personal or family history of skin cancer.   Past Medical History:   Diagnosis Date     Arthritis      Factor 5 Leiden mutation, heterozygous (H)      Factor 5 Leiden mutation, heterozygous (H)      Factor 5 Leiden mutation, heterozygous (H)      Hyperlipidemia LDL goal < 100      Morbid obesity (H)      Thyroid disease February 1994    Precancerous Thyroid - 1/2 removed       Past Surgical History:   Procedure Laterality Date     ABDOMEN SURGERY  December 2012    Hysterectomy     BIOPSY  November 2012    utererus     C ANESTH,CLEFT PALATE REPAIR       C ANESTH,ELBOW,NOS  01/76     C REMOVE BENIGN THYROID LESION  01/2002    nodule removed      C TOTAL ABDOM HYSTERECTOMY  12/14/2012    pathology benign, no further paps needed     COLONOSCOPY  October 2012     ENT SURGERY  February 1994    Thyroid issue     ORTHOPEDIC SURGERY  01/1976    [place]     TUBAL LIGATION          Family History   Problem Relation Age of Onset     Cerebrovascular Disease Mother      Eye Disorder Mother      glaucoma     HEART DISEASE Mother      Diabetes Mother      Hypertension Mother      Blood Disease Brother      DVT     Cancer Brother      Blood Disease Brother      DVT     Blood Disease Brother      DVT     Blood Disease Brother      DVT     Blood Disease Son 27     blood clots      Other Cancer Brother      Lung Cancer     Diabetes Brother      Obesity Brother      Bariatric surgery     Diabetes Brother      Hypertension Brother      Other Cancer Brother      Pancreatic Cancer     Melanoma No family hx of        Social History     Social History     Marital status:      Spouse name: N/A     Number of children: N/A     Years of education: N/A     Occupational History     Not on file.     Social History Main Topics     Smoking status: Never Smoker     Smokeless tobacco: Never Used      Comment: Nonsmoking household     Alcohol use Yes      Comment: Occasionally     Drug use: No     Sexual activity: Yes     Partners: Male     Birth control/ protection: Female Surgical      Comment: tubal ligation     Other Topics Concern     Parent/Sibling W/ Cabg, Mi Or Angioplasty Before 65f 55m? Yes     Brother     Social History Narrative       Outpatient Encounter Prescriptions as of 11/15/2018   Medication Sig Dispense Refill     MULTIVITAMIN TABS   OR 1 TABLET DAILY       tretinoin (RETIN-A) 0.05 % cream Spread a pea size amount into affected area topically at bedtime.  Use sunscreen SPF>20. 45 g 11     No facility-administered encounter medications on file as of 11/15/2018.              Review Of Systems  Skin: As above  Eyes: negative  Ears/Nose/Throat: negative  Respiratory: No shortness of breath, dyspnea on exertion, cough, or hemoptysis  Cardiovascular: negative  Gastrointestinal: negative  Genitourinary: negative  Musculoskeletal: negative  Neurologic: negative  Psychiatric: negative  Hematologic/Lymphatic/Immunologic: negative  Endocrine: negative      O:   NAD, WDWN, Alert & Oriented, Mood & Affect wnl, Vitals stable   Here today alone   /83  Pulse 70  LMP 10/19/2012  SpO2 97%   General appearance normal   Vitals stable   Alert, oriented and in no acute distress      0.4 cm pinkish brown pearly papule on left upper back   Stuck on papules and brown macules on trunk and ext   Red  papules on trunk  Brown papules and macules with regular pigment network and borders on torso and extremities      The remainder of the detailed exam was unremarkable; the following areas were examined:  scalp/hair, conjunctiva/lids, face, neck, lips/teeth, oral mucosa/gingiva, chest, back, abdomen, buttocks, digits/nails, RUE, LUE, RLE, LLE.      Eyes: Conjunctivae/lids:Normal     ENT: Lips: normal    MSK:Normal    Cardiovascular: peripheral edema none    Pulm: Breathing Normal    Neuro/Psych: Orientation:Normal; Mood/Affect:Normal  A/P:  1. R/O pigmented BCC on left upper back   TANGENTIAL BIOPSY SENT OUT:  After consent, anesthesia with LEC and prep, tangential excision performed and specimen sent out for permanent section histology.  No complications and routine wound care. Patient told to call our office in 1-2 weeks for result.      2. Age related wrinkles  Patient requested tretinoin. Discussed that it will not be covered by insurance. Can use goodrx.com to discount prescription.   3. Seborrheic keratosis, lentigo, angioma, benign nevi  BENIGN LESIONS DISCUSSED WITH PATIENT:  I discussed the specifics of tumor, prognosis, and genetics of benign lesions.  I explained that treatment of these lesions would be purely cosmetic and not medically neccessary.  I discussed with patient different removal options including excision, cautery and /or laser.      Nature and genetics of benign skin lesions dicussed with patient.  Signs and Symptoms of skin cancer discussed with patient.  ABCDEs of melanoma reviewed with patient.  Patient encouraged to perform monthly skin exams.  UV precautions reviewed with patient.  Risks of non-melanoma skin cancer discussed with patient   Return to clinic one year or sooner pending biopsy results.         Again, thank you for allowing me to participate in the care of your patient.        Sincerely,        Anisa Hsu PA-C

## 2018-11-15 NOTE — PROGRESS NOTES
Missy Kirkpatrick is a 58 year old year old female patient here today for skin check.  Patient reports that she has had a lot of sun exposure. She reports she has only had one sunburn as a teenager but otherwise her skin will typically tan. She does use sunscreen.  Patient has no other skin complaints today.  Remainder of the HPI, Meds, PMH, Allergies, FH, and SH was reviewed in chart.    Pertinent Hx:   No personal or family history of skin cancer.   Past Medical History:   Diagnosis Date     Arthritis      Factor 5 Leiden mutation, heterozygous (H)      Factor 5 Leiden mutation, heterozygous (H)      Factor 5 Leiden mutation, heterozygous (H)      Hyperlipidemia LDL goal < 100      Morbid obesity (H)      Thyroid disease February 1994    Precancerous Thyroid - 1/2 removed       Past Surgical History:   Procedure Laterality Date     ABDOMEN SURGERY  December 2012    Hysterectomy     BIOPSY  November 2012    utererus     C ANESTH,CLEFT PALATE REPAIR       C ANESTH,ELBOW,NOS  01/76     C REMOVE BENIGN THYROID LESION  01/2002    nodule removed      C TOTAL ABDOM HYSTERECTOMY  12/14/2012    pathology benign, no further paps needed     COLONOSCOPY  October 2012     ENT SURGERY  February 1994    Thyroid issue     ORTHOPEDIC SURGERY  01/1976    [place]     TUBAL LIGATION          Family History   Problem Relation Age of Onset     Cerebrovascular Disease Mother      Eye Disorder Mother      glaucoma     HEART DISEASE Mother      Diabetes Mother      Hypertension Mother      Blood Disease Brother      DVT     Cancer Brother      Blood Disease Brother      DVT     Blood Disease Brother      DVT     Blood Disease Brother      DVT     Blood Disease Son 27     blood clots     Other Cancer Brother      Lung Cancer     Diabetes Brother      Obesity Brother      Bariatric surgery     Diabetes Brother      Hypertension Brother      Other Cancer Brother      Pancreatic Cancer     Melanoma No family hx of        Social History      Social History     Marital status:      Spouse name: N/A     Number of children: N/A     Years of education: N/A     Occupational History     Not on file.     Social History Main Topics     Smoking status: Never Smoker     Smokeless tobacco: Never Used      Comment: Nonsmoking household     Alcohol use Yes      Comment: Occasionally     Drug use: No     Sexual activity: Yes     Partners: Male     Birth control/ protection: Female Surgical      Comment: tubal ligation     Other Topics Concern     Parent/Sibling W/ Cabg, Mi Or Angioplasty Before 65f 55m? Yes     Brother     Social History Narrative       Outpatient Encounter Prescriptions as of 11/15/2018   Medication Sig Dispense Refill     MULTIVITAMIN TABS   OR 1 TABLET DAILY       tretinoin (RETIN-A) 0.05 % cream Spread a pea size amount into affected area topically at bedtime.  Use sunscreen SPF>20. 45 g 11     No facility-administered encounter medications on file as of 11/15/2018.              Review Of Systems  Skin: As above  Eyes: negative  Ears/Nose/Throat: negative  Respiratory: No shortness of breath, dyspnea on exertion, cough, or hemoptysis  Cardiovascular: negative  Gastrointestinal: negative  Genitourinary: negative  Musculoskeletal: negative  Neurologic: negative  Psychiatric: negative  Hematologic/Lymphatic/Immunologic: negative  Endocrine: negative      O:   NAD, WDWN, Alert & Oriented, Mood & Affect wnl, Vitals stable   Here today alone   /83  Pulse 70  LMP 10/19/2012  SpO2 97%   General appearance normal   Vitals stable   Alert, oriented and in no acute distress      0.4 cm pinkish brown pearly papule on left upper back   Stuck on papules and brown macules on trunk and ext   Red papules on trunk  Brown papules and macules with regular pigment network and borders on torso and extremities      The remainder of the detailed exam was unremarkable; the following areas were examined:  scalp/hair, conjunctiva/lids, face, neck,  lips/teeth, oral mucosa/gingiva, chest, back, abdomen, buttocks, digits/nails, RUE, LUE, RLE, LLE.      Eyes: Conjunctivae/lids:Normal     ENT: Lips: normal    MSK:Normal    Cardiovascular: peripheral edema none    Pulm: Breathing Normal    Neuro/Psych: Orientation:Normal; Mood/Affect:Normal  A/P:  1. R/O pigmented BCC on left upper back   TANGENTIAL BIOPSY SENT OUT:  After consent, anesthesia with LEC and prep, tangential excision performed and specimen sent out for permanent section histology.  No complications and routine wound care. Patient told to call our office in 1-2 weeks for result.      2. Age related wrinkles  Patient requested tretinoin. Discussed that it will not be covered by insurance. Can use goodrx.com to discount prescription.   3. Seborrheic keratosis, lentigo, angioma, benign nevi  BENIGN LESIONS DISCUSSED WITH PATIENT:  I discussed the specifics of tumor, prognosis, and genetics of benign lesions.  I explained that treatment of these lesions would be purely cosmetic and not medically neccessary.  I discussed with patient different removal options including excision, cautery and /or laser.      Nature and genetics of benign skin lesions dicussed with patient.  Signs and Symptoms of skin cancer discussed with patient.  ABCDEs of melanoma reviewed with patient.  Patient encouraged to perform monthly skin exams.  UV precautions reviewed with patient.  Risks of non-melanoma skin cancer discussed with patient   Return to clinic one year or sooner pending biopsy results.

## 2018-11-21 LAB — COPATH REPORT: NORMAL

## 2018-11-21 NOTE — PROGRESS NOTES
SUBJECTIVE:   Missy Kirkpatrick is a 58 year old female who presents to clinic today for the following health issues:      ED/UC Followup:    Facility:  [facility]   Date of visit: 11/4/18  Reason for visit: Appendicitis/ Appendectomy   Current Status: Would like to verify that incision is healing well.  Bowels took a while to regulate but are better now.           Back to baseline, healing well.      Uncertain of derm instructions for f/u - pt with BCC on back, margins not clear, needs Mohs per path report and plan on result note.    Problem list and histories reviewed & adjusted, as indicated.  Additional history: as documented    Patient Active Problem List   Diagnosis     CARDIOVASCULAR SCREENING; LDL GOAL LESS THAN 160     Female stress incontinence     Factor 5 Leiden mutation, heterozygous (H)     Multiple thyroid nodules     S/P partial thyroidectomy     Advanced directives, counseling/discussion     Right rotator cuff tendonitis     Complete tear of right rotator cuff     Past Surgical History:   Procedure Laterality Date     ABDOMEN SURGERY  December 2012    Hysterectomy     APPENDECTOMY       BIOPSY  November 2012    utererus     C ANESTH,CLEFT PALATE REPAIR       C ANESTH,ELBOW,NOS  01/76     C REMOVE BENIGN THYROID LESION  01/2002    nodule removed      C TOTAL ABDOM HYSTERECTOMY  12/14/2012    pathology benign, no further paps needed     COLONOSCOPY  October 2012     ENT SURGERY  February 1994    Thyroid issue     ORTHOPEDIC SURGERY  01/1976    [place]     TUBAL LIGATION         Social History   Substance Use Topics     Smoking status: Never Smoker     Smokeless tobacco: Never Used      Comment: Nonsmoking household     Alcohol use Yes      Comment: Occasionally     Family History   Problem Relation Age of Onset     Cerebrovascular Disease Mother      Eye Disorder Mother      glaucoma     HEART DISEASE Mother      Diabetes Mother      Hypertension Mother      Blood  Disease Brother      DVT     Cancer Brother      Blood Disease Brother      DVT     Blood Disease Brother      DVT     Blood Disease Brother      DVT     Blood Disease Son 27     blood clots     Other Cancer Brother      Lung Cancer     Diabetes Brother      Obesity Brother      Bariatric surgery     Diabetes Brother      Hypertension Brother      Other Cancer Brother      Pancreatic Cancer     Melanoma No family hx of          Current Outpatient Prescriptions   Medication Sig Dispense Refill     MULTIVITAMIN TABS   OR 1 TABLET DAILY       tretinoin (RETIN-A) 0.05 % cream Spread a pea size amount into affected area topically at bedtime.  Use sunscreen SPF>20. (Patient not taking: Reported on 11/26/2018) 45 g 11     BP Readings from Last 3 Encounters:   11/26/18 124/82   11/15/18 123/83   04/02/18 104/74    Wt Readings from Last 3 Encounters:   11/26/18 225 lb 12.8 oz (102.4 kg)   04/02/18 219 lb (99.3 kg)   03/20/18 219 lb (99.3 kg)                  Labs reviewed in EPIC    Reviewed and updated as needed this visit by clinical staff  Tobacco  Allergies  Meds  Problems  Med Hx  Surg Hx  Fam Hx  Soc Hx        Reviewed and updated as needed this visit by Provider  Allergies  Meds  Problems         ROS:  Constitutional, HEENT, cardiovascular, pulmonary, gi and gu systems are negative, except as otherwise noted.    OBJECTIVE:     /82  Pulse 65  Temp 97.1  F (36.2  C) (Oral)  Resp 18  Wt 225 lb 12.8 oz (102.4 kg)  LMP 10/19/2012  BMI 34.33 kg/m2  Body mass index is 34.33 kg/(m^2).  GENERAL: healthy, alert and no distress  EYES: Eyes grossly normal to inspection, PERRL and conjunctivae and sclerae normal  ABDOMEN: soft, nontender, without hepatosplenomegaly or masses, bowel sounds normal and well-healed incisions x 3  MS: no gross musculoskeletal defects noted, no edema  SKIN: no suspicious lesions or rashes  PSYCH: mentation appears normal, affect normal/bright    Diagnostic Test Results:  none      ASSESSMENT/PLAN:     (Z09) Hospital discharge follow-up  (primary encounter diagnosis)  (K35.80) Acute appendicitis, unspecified acute appendicitis type  Comment: fully recovered  Plan: incisions well healed, no restrictions    (C44.519) Basal cell carcinoma (BCC) of back  Comment: needs f/u  Plan: DERMATOLOGY REFERRAL        Refer back to derm, reviewed Mohs and why needed    (Z12.31) Encounter for screening mammogram for breast cancer  Comment: due  Plan: *MA Screening Digital Bilateral              See Patient Instructions    Shell Osuna MD  [facility]

## 2018-11-26 ENCOUNTER — OFFICE VISIT (OUTPATIENT)
Dept: FAMILY MEDICINE | Facility: CLINIC | Age: 58
End: 2018-11-26
Payer: COMMERCIAL

## 2018-11-26 VITALS
TEMPERATURE: 97.1 F | WEIGHT: 225.8 LBS | RESPIRATION RATE: 18 BRPM | DIASTOLIC BLOOD PRESSURE: 82 MMHG | SYSTOLIC BLOOD PRESSURE: 124 MMHG | BODY MASS INDEX: 34.33 KG/M2 | HEART RATE: 65 BPM

## 2018-11-26 DIAGNOSIS — Z12.31 ENCOUNTER FOR SCREENING MAMMOGRAM FOR BREAST CANCER: ICD-10-CM

## 2018-11-26 DIAGNOSIS — C44.519 BASAL CELL CARCINOMA (BCC) OF BACK: ICD-10-CM

## 2018-11-26 DIAGNOSIS — K35.80 ACUTE APPENDICITIS, UNSPECIFIED ACUTE APPENDICITIS TYPE: ICD-10-CM

## 2018-11-26 DIAGNOSIS — Z09 HOSPITAL DISCHARGE FOLLOW-UP: Primary | ICD-10-CM

## 2018-11-26 PROCEDURE — 99213 OFFICE O/P EST LOW 20 MIN: CPT | Performed by: FAMILY MEDICINE

## 2018-11-26 NOTE — NURSING NOTE
"Chief Complaint   Patient presents with     RECHECK       Initial /82  Pulse 65  Temp 97.1  F (36.2  C) (Oral)  Resp 18  Wt 225 lb 12.8 oz (102.4 kg)  LMP 10/19/2012  BMI 34.33 kg/m2 Estimated body mass index is 34.33 kg/(m^2) as calculated from the following:    Height as of 3/20/18: 5' 8\" (1.727 m).    Weight as of this encounter: 225 lb 12.8 oz (102.4 kg).  Medication Reconciliation: complete  Yasmany Munroe CMA    "

## 2018-11-26 NOTE — MR AVS SNAPSHOT
After Visit Summary   11/26/2018    Missy Kirkpatrick    MRN: [id number]           Patient Information     Date Of Birth          1960        Visit Information        Provider Department      11/26/2018 11:00 AM Shell Osuna MD [facility]        Today's Diagnoses     Basal cell carcinoma (BCC) of back    -  1    Encounter for screening mammogram for breast cancer          Care Instructions      Follow up for additional removal of lesion on back          Follow-ups after your visit        Additional Services     DERMATOLOGY REFERRAL       Your provider has referred you to: [facility]: [facility] [phone]   [email or web address]    Please be aware that coverage of these services is subject to the terms and limitations of your health insurance plan.  Call member services at your health plan with any benefit or coverage questions.      Please bring the following with you to your appointment:    (1) Any X-Rays, CTs or MRIs which have been performed.  Contact the facility where they were done to arrange for  prior to your scheduled appointment.    (2) List of current medications  (3) This referral request   (4) Any documents/labs given to you for this referral                  Follow-up notes from your care team     Return in about 2 months (around 1/26/2019) for Physical Exam.      Future tests that were ordered for you today     Open Future Orders        Priority Expected Expires Ordered    *MA Screening Digital Bilateral Routine  11/26/2019 11/26/2018            Who to contact     If you have questions or need follow up information about today's clinic visit or your schedule please contact [facility] directly at [phone].  Normal or non-critical lab and imaging results will be communicated to you by MyChart, letter or phone within 4 business days after the clinic has received the results. If you do not  hear from us within 7 days, please contact the clinic through [employer] or phone. If you have a critical or abnormal lab result, we will notify you by phone as soon as possible.  Submit refill requests through [employer] or call your pharmacy and they will forward the refill request to us. Please allow 3 business days for your refill to be completed.          Additional Information About Your Visit        Anonymous [employer] Information     [employer] gives you secure access to your electronic health record. If you see a primary care provider, you can also send messages to your care team and make appointments. If you have questions, please call your primary care clinic.  If you do not have a primary care provider, please call [phone] and they will assist you.        Care EveryWhere ID     This is your Care EveryWhere ID. This could be used by other organizations to access your [facility] medical records  [id number]        Your Vitals Were     Pulse Temperature Respirations Last Period BMI (Body Mass Index)       65 97.1  F (36.2  C) (Oral) 18 10/19/2012 34.33 kg/m2        Blood Pressure from Last 3 Encounters:   11/26/18 124/82   11/15/18 123/83   04/02/18 104/74    Weight from Last 3 Encounters:   11/26/18 225 lb 12.8 oz (102.4 kg)   04/02/18 219 lb (99.3 kg)   03/20/18 219 lb (99.3 kg)              We Performed the Following     DERMATOLOGY REFERRAL        Primary Care Provider Office Phone # Fax #    Shell Adriane Osuna [phone] [address] [facility] [address]        Equal Access to Services     ENRIQUE RAMAN : Hadii aad ku hadasho Soomaali, waaxda luqadaha, qaybta kaalmada adeegyada, raudel bradshaw. So [facility] [phone].    ATENCIÓN: Si habla español, tiene a mars disposición servicios gratuitos de asistencia lingüística. Llame al [phone].    We comply with applicable federal civil rights laws and [place] laws. We do not discriminate on the basis of race, color,  national origin, age, disability, sex, sexual orientation, or gender identity.            Thank you!     [facility] you for [facility]  [facility] your [facility]. Our goal is always to provide you with excellent care. Hearing back from our patients is one way we can continue to improve our services. Please take a few minutes to complete the written survey that you may receive in the mail after your visit with us. Thank you!             Your Updated Medication List - Protect others around you: Learn how to safely use, store and throw away your medicines at www.disposemymeds.org.          This list is accurate as of 11/26/18 11:22 AM.  Always use your most recent med list.                   Brand Name Dispense Instructions for use Diagnosis    MULTIVITAMIN TABS   OR      1 TABLET DAILY        tretinoin 0.05 % cream    RETIN-A    45 g    Spread a pea size amount into affected area topically at bedtime.  Use sunscreen SPF>20.    Age-related facial wrinkles

## 2018-12-14 ENCOUNTER — ANCILLARY PROCEDURE (OUTPATIENT)
Dept: MAMMOGRAPHY | Facility: CLINIC | Age: 58
End: 2018-12-14
Attending: FAMILY MEDICINE
Payer: COMMERCIAL

## 2018-12-14 DIAGNOSIS — Z12.31 ENCOUNTER FOR SCREENING MAMMOGRAM FOR BREAST CANCER: ICD-10-CM

## 2018-12-14 PROCEDURE — 77067 SCR MAMMO BI INCL CAD: CPT | Mod: TC

## 2018-12-19 NOTE — PROGRESS NOTES
SUBJECTIVE:   CC: Missy Kirkpatrick is an 58 year old woman who presents for preventive health visit.     Healthy Habits:  Answers for HPI/ROS submitted by the patient on 12/26/2018   Annual Exam:  Frequency of exercise:: 2-3 days/week  Getting at least 3 servings of Calcium per day:: Yes  Diet:: Regular (no restrictions)  Taking medications regularly:: Yes  Medication side effects:: None  Bi-annual eye exam:: NO  Dental care twice a year:: Yes  Sleep apnea or symptoms of sleep apnea:: None  Negative for the following: abdominal pain, Blood in stool, Blood in urine, chest pain, chills, congestion, constipation, cough, diarrhea, dizziness, ear pain, eye pain, nervous/anxious, fever, frequency, genital sores, headaches, hearing loss, heartburn, arthralgias, joint swelling, peripheral edema, mood changes, myalgias, nausea, dysuria, palpitations, Skin sensation changes, sore throat, urgency, rash, shortness of breath, visual disturbance, weakness  Additional concerns today:: Yes  Duration of exercise:: 15-30 minutes      {Outside tests to abstract? :855764}    {additional problems to add (Optional):592530}    Today's PHQ-2 Score:   PHQ-2 ( 1999 Pfizer) 11/26/2018 11/13/2017   Q1: Little interest or pleasure in doing things 0 0   Q2: Feeling down, depressed or hopeless 0 0   PHQ-2 Score 0 0   Q1: Little interest or pleasure in doing things - Not at all   Q2: Feeling down, depressed or hopeless - Not at all   PHQ-2 Score - 0       Abuse: Current or Past(Physical, Sexual or Emotional)- No  Do you feel safe in your environment? Yes    Social History     Tobacco Use     Smoking status: Never Smoker     Smokeless tobacco: Never Used     Tobacco comment: Nonsmoking household   Substance Use Topics     Alcohol use: Yes     Comment: Occasionally     If you drink alcohol do you typically have >3 drinks per day or >7 drinks per week? No                     Reviewed orders with patient.  Reviewed health maintenance and updated  "orders accordingly - Yes    G 3 P 3   Patient's last menstrual period was 10/19/2012.     Fasting: {YES:884406}   Td: tdap 10/2009       Status post benign hysterectomy. Health Maintenance and Surgical History updated.               Cholesterol:   Lab Results   Component Value Date    CHOL 208 10/21/2015     Lab Results   Component Value Date    HDL 66 10/21/2015     Lab Results   Component Value Date     10/21/2015     Lab Results   Component Value Date    TRIG 80 10/21/2015     Lab Results   Component Value Date    CHOLHDLRATIO 3.2 10/21/2015         MM/18  Dexa:  NA     Flex/colo: 12/10      Seat Belt: {YES:371555}   Sunscreen use: {YES:589675}  Calcium Intake: ***   Health Care Directive: {YES:192503}  Sexually Active: Yes     Current contraception: hysterectomy  History of abnormal Pap smear: No  Family history of colon/breast/ovarian cancer: No  Regular self breast exam: Yes  History of abnormal mammogram: No        {Chronicprobdata (Optional):962011}    {Mammo Decision Support (Optional):422012}    Pertinent mammograms are reviewed under the imaging tab.  History of abnormal Pap smear: {PAP HX:657505}  PAP / HPV 2012 10/2/2009   PAP OTHER-NIL, See Result OTHER-NIL EM>40     Reviewed and updated as needed this visit by clinical staff         Reviewed and updated as needed this visit by Provider        {HISTORY OPTIONS (Optional):628751}    ROS:  { :760944}    OBJECTIVE:   LMP 10/19/2012   EXAM:  {Exam Choices:671986}    {Diagnostic Test Results (Optional):488680::\"Diagnostic Test Results:\",\"none \"}    ASSESSMENT/PLAN:   {Diag Picklist:147275}    COUNSELING:   {FEMALE COUNSELING MESSAGES:715938::\"Reviewed preventive health counseling, as reflected in patient instructions\"}    BP Readings from Last 1 Encounters:   18 124/82     Estimated body mass index is 34.33 kg/m  as calculated from the following:    Height as of 3/20/18: 1.727 m (5' 8\").    Weight as of 18: 102.4 kg (225 lb " 12.8 oz).    {BP Counseling- Complete if BP >= 120/80  (Optional):450308}  {Weight Management Plan (ACO) Complete if BMI is abnormal-  Ages 18-64  BMI >24.9.  Age 65+ with BMI <23 or >30 (Optional):371076}     reports that  has never smoked. she has never used smokeless tobacco.  {Tobacco Cessation -- Complete if patient is a smoker (Optional):135405}    Counseling Resources:  ATP IV Guidelines  Pooled Cohorts Equation Calculator  Breast Cancer Risk Calculator  FRAX Risk Assessment  ICSI Preventive Guidelines  Dietary Guidelines for Americans, 2010  USDA's MyPlate  ASA Prophylaxis  Lung CA Screening    Shell Osuna MD  [facility]

## 2018-12-20 ENCOUNTER — OFFICE VISIT (OUTPATIENT)
Dept: FAMILY MEDICINE | Facility: CLINIC | Age: 58
End: 2018-12-20
Payer: COMMERCIAL

## 2018-12-20 VITALS
HEART RATE: 101 BPM | DIASTOLIC BLOOD PRESSURE: 81 MMHG | TEMPERATURE: 98.8 F | SYSTOLIC BLOOD PRESSURE: 114 MMHG | WEIGHT: 220 LBS | OXYGEN SATURATION: 97 % | RESPIRATION RATE: 18 BRPM | BODY MASS INDEX: 33.45 KG/M2

## 2018-12-20 DIAGNOSIS — J02.0 STREPTOCOCCAL SORE THROAT: Primary | ICD-10-CM

## 2018-12-20 LAB
DEPRECATED S PYO AG THROAT QL EIA: ABNORMAL
SPECIMEN SOURCE: ABNORMAL

## 2018-12-20 PROCEDURE — 87880 STREP A ASSAY W/OPTIC: CPT | Performed by: FAMILY MEDICINE

## 2018-12-20 PROCEDURE — 99213 OFFICE O/P EST LOW 20 MIN: CPT | Performed by: FAMILY MEDICINE

## 2018-12-20 RX ORDER — AMOXICILLIN 875 MG
875 TABLET ORAL 2 TIMES DAILY
Qty: 20 TABLET | Refills: 0 | Status: SHIPPED | OUTPATIENT
Start: 2018-12-20 | End: 2019-03-27

## 2018-12-20 ASSESSMENT — PAIN SCALES - GENERAL: PAINLEVEL: NO PAIN (0)

## 2018-12-20 ASSESSMENT — ENCOUNTER SYMPTOMS
SORE THROAT: 1
COUGH: 1
CHILLS: 1
FEVER: 0

## 2018-12-20 NOTE — NURSING NOTE
"Chief Complaint   Patient presents with     URI     sore throat x 5 days - ear pain       Initial /81   Pulse 101   Temp 98.8  F (37.1  C) (Oral)   Resp 18   Wt 99.8 kg (220 lb)   LMP 10/19/2012   SpO2 97%   BMI 33.45 kg/m   Estimated body mass index is 33.45 kg/m  as calculated from the following:    Height as of 3/20/18: 1.727 m (5' 8\").    Weight as of this encounter: 99.8 kg (220 lb).  Medication Reconciliation: complete  Josette Lombardo M.A.    "

## 2018-12-20 NOTE — PROGRESS NOTES
URI   This is a new problem. Episode onset: days5. The problem has been gradually worsening. Associated symptoms include chills, coughing and a sore throat. Pertinent negatives include no congestion or fever.         Review of Systems   Constitutional: Positive for chills. Negative for fever.   HENT: Positive for sore throat. Negative for congestion.    Respiratory: Positive for cough.        no apparent distress  /81   Pulse 101   Temp 98.8  F (37.1  C) (Oral)   Resp 18   Wt 99.8 kg (220 lb)   LMP 10/19/2012   SpO2 97%   BMI 33.45 kg/m        Physical Exam   Constitutional: She appears well-developed.   HENT:   Head: Normocephalic.   Right Ear: External ear normal.   Left Ear: External ear normal.   Neck: Normal range of motion.   Cardiovascular: Normal rate and regular rhythm.   Pulmonary/Chest: Effort normal and breath sounds normal.   Abdominal: Soft. Bowel sounds are normal.   RS positive        ICD-10-CM    1. Streptococcal sore throat J02.0 Rapid strep screen    PLAN:Retun to clinic as needed

## 2018-12-26 ENCOUNTER — OFFICE VISIT (OUTPATIENT)
Dept: FAMILY MEDICINE | Facility: CLINIC | Age: 58
End: 2018-12-26
Payer: COMMERCIAL

## 2018-12-26 VITALS
OXYGEN SATURATION: 98 % | WEIGHT: 224 LBS | HEIGHT: 68 IN | RESPIRATION RATE: 18 BRPM | BODY MASS INDEX: 33.95 KG/M2 | SYSTOLIC BLOOD PRESSURE: 113 MMHG | HEART RATE: 76 BPM | TEMPERATURE: 97 F | DIASTOLIC BLOOD PRESSURE: 78 MMHG

## 2018-12-26 DIAGNOSIS — Z00.00 ROUTINE GENERAL MEDICAL EXAMINATION AT A HEALTH CARE FACILITY: Primary | ICD-10-CM

## 2018-12-26 DIAGNOSIS — N95.2 ATROPHIC VAGINITIS: ICD-10-CM

## 2018-12-26 LAB
CHOLEST SERPL-MCNC: 233 MG/DL
GLUCOSE SERPL-MCNC: 91 MG/DL (ref 70–99)
HDLC SERPL-MCNC: 75 MG/DL
LDLC SERPL CALC-MCNC: 142 MG/DL
NONHDLC SERPL-MCNC: 158 MG/DL
TRIGL SERPL-MCNC: 81 MG/DL

## 2018-12-26 PROCEDURE — 80061 LIPID PANEL: CPT | Performed by: FAMILY MEDICINE

## 2018-12-26 PROCEDURE — 82947 ASSAY GLUCOSE BLOOD QUANT: CPT | Performed by: FAMILY MEDICINE

## 2018-12-26 PROCEDURE — 36415 COLL VENOUS BLD VENIPUNCTURE: CPT | Performed by: FAMILY MEDICINE

## 2018-12-26 PROCEDURE — 99396 PREV VISIT EST AGE 40-64: CPT | Performed by: FAMILY MEDICINE

## 2018-12-26 ASSESSMENT — ENCOUNTER SYMPTOMS
ABDOMINAL PAIN: 0
NERVOUS/ANXIOUS: 0
NAUSEA: 0
SHORTNESS OF BREATH: 0
DIARRHEA: 0
WEAKNESS: 0
DYSURIA: 0
COUGH: 0
EYE PAIN: 0
MYALGIAS: 0
HEARTBURN: 0
PARESTHESIAS: 0
FEVER: 0
FREQUENCY: 0
ARTHRALGIAS: 0
HEADACHES: 0
HEMATOCHEZIA: 0
CONSTIPATION: 0
HEMATURIA: 0
PALPITATIONS: 0
JOINT SWELLING: 0
SORE THROAT: 0
DIZZINESS: 0
CHILLS: 0

## 2018-12-26 ASSESSMENT — MIFFLIN-ST. JEOR: SCORE: 1639.56

## 2018-12-26 NOTE — PROGRESS NOTES
G 3 P 3   Patient's last menstrual period was 10/19/2012.     Fasting: {YES:527275}   Td: tdap 10/2009       Status post benign hysterectomy. Health Maintenance and Surgical History updated.               Cholesterol:   Lab Results   Component Value Date    CHOL 208 10/21/2015     Lab Results   Component Value Date    HDL 66 10/21/2015     Lab Results   Component Value Date     10/21/2015     Lab Results   Component Value Date    TRIG 80 10/21/2015     Lab Results   Component Value Date    CHOLHDLRATIO 3.2 10/21/2015         MM/18  Dexa:  NA     Flex/colo: 12/10      Seat Belt: {YES:761527}   Sunscreen use: {YES:337730}  Calcium Intake: ***   Health Care Directive: {YES:780340}  Sexually Active: Yes     Current contraception: hysterectomy  History of abnormal Pap smear: No  Family history of colon/breast/ovarian cancer: No  Regular self breast exam: Yes  History of abnormal mammogram: No

## 2018-12-26 NOTE — PROGRESS NOTES
SUBJECTIVE:   CC: Missy Kirkpatrick is an 58 year old woman who presents for preventive health visit.     Physical   Annual:     Getting at least 3 servings of Calcium per day:  Yes    Bi-annual eye exam:  NO    Dental care twice a year:  Yes    Sleep apnea or symptoms of sleep apnea:  None    Diet:  Regular (no restrictions)    Frequency of exercise:  2-3 days/week    Duration of exercise:  15-30 minutes    Taking medications regularly:  Yes    Medication side effects:  None    Additional concerns today:  Yes    PHQ-2 Total Score: 0        Glucose elevated in 11/18 during appy.  Fasting today        Today's PHQ-2 Score:   PHQ-2 ( 1999 Pfizer) 12/26/2018   Q1: Little interest or pleasure in doing things 0   Q2: Feeling down, depressed or hopeless 0   PHQ-2 Score 0   Q1: Little interest or pleasure in doing things Not at all   Q2: Feeling down, depressed or hopeless Not at all   PHQ-2 Score 0       Abuse: Current or Past(Physical, Sexual or Emotional)- No  Do you feel safe in your environment? Yes    Social History     Tobacco Use     Smoking status: Never Smoker     Smokeless tobacco: Never Used     Tobacco comment: Nonsmoking household   Substance Use Topics     Alcohol use: Yes     Comment: Occasionally     Alcohol Use 12/26/2018   If you drink alcohol do you typically have greater than 3 drinks per day OR greater than 7 drinks per week? No       Reviewed orders with patient.  Reviewed health maintenance and updated orders accordingly - Yes    G 3 P 3   Patient's last menstrual period was 10/19/2012.     Fasting: Yes    Td: tdap 10/2009       Status post benign hysterectomy. Health Maintenance and Surgical History updated.               Cholesterol:   Lab Results   Component Value Date    CHOL 208 10/21/2015     Lab Results   Component Value Date    HDL 66 10/21/2015     Lab Results   Component Value Date     10/21/2015     Lab Results   Component Value Date    TRIG 80 10/21/2015     Lab Results    Component Value Date    CHOLCYRUSO 3.2 10/21/2015         MM/18  Dexa:  NA     Flex/colo: 12/10      Seat Belt: Yes    Sunscreen use: Yes   Calcium Intake: adeq  Health Care Directive: No  Sexually Active: Yes     Current contraception: hysterectomy  History of abnormal Pap smear: No  Family history of colon/breast/ovarian cancer: No  Regular self breast exam: Yes  History of abnormal mammogram: No        Labs reviewed in EPIC  BP Readings from Last 3 Encounters:   18 113/78   18 114/81   18 124/82    Wt Readings from Last 3 Encounters:   18 101.6 kg (224 lb)   18 99.8 kg (220 lb)   18 102.4 kg (225 lb 12.8 oz)                  Patient Active Problem List   Diagnosis     CARDIOVASCULAR SCREENING; LDL GOAL LESS THAN 160     Female stress incontinence     Factor 5 Leiden mutation, heterozygous (H)     Multiple thyroid nodules     S/P partial thyroidectomy     Advanced directives, counseling/discussion     Right rotator cuff tendonitis     Complete tear of right rotator cuff     Past Surgical History:   Procedure Laterality Date     ABDOMEN SURGERY  2012    Hysterectomy     APPENDECTOMY       BIOPSY  2012    utererus     C ANESTH,CLEFT PALATE REPAIR       C ANESTH,ELBOW,NOS       C REMOVE BENIGN THYROID LESION  2002    nodule removed      C TOTAL ABDOM HYSTERECTOMY  2012    pathology benign, no further paps needed     COLONOSCOPY  2012     ENT SURGERY  1994    Thyroid issue     ORTHOPEDIC SURGERY  1976    [place]     TUBAL LIGATION         Social History     Tobacco Use     Smoking status: Never Smoker     Smokeless tobacco: Never Used     Tobacco comment: Nonsmoking household   Substance Use Topics     Alcohol use: Yes     Comment: Occasionally     Family History   Problem Relation Age of Onset     Cerebrovascular Disease Mother      Eye Disorder Mother         glaucoma     Heart Disease Mother      Diabetes Mother       Hypertension Mother      Blood Disease Son 27        blood clots     Other Cancer Brother         Lung Cancer     Diabetes Brother      Lung Cancer Brother      Blood Disease Brother      Obesity Brother         Bariatric surgery     Blood Disease Brother      Diabetes Brother      Blood Disease Brother      Hypertension Brother      Other Cancer Brother         Pancreatic Cancer     Pancreatic Cancer Brother      Blood Disease Brother      Melanoma No family hx of          Current Outpatient Medications   Medication Sig Dispense Refill     amoxicillin (AMOXIL) 875 MG tablet Take 1 tablet (875 mg) by mouth 2 times daily for 10 days 20 tablet 0     MULTIVITAMIN TABS   OR 1 TABLET DAILY       tretinoin (RETIN-A) 0.05 % cream Spread a pea size amount into affected area topically at bedtime.  Use sunscreen SPF>20. 45 g 11       Mammogram Screening: Patient over age 50, mutual decision to screen reflected in health maintenance.    Pertinent mammograms are reviewed under the imaging tab.  Reviewed and updated as needed this visit by clinical staff  Tobacco  Allergies  Meds  Problems  Med Hx  Surg Hx  Fam Hx  Soc Hx          Reviewed and updated as needed this visit by Provider  Tobacco  Allergies  Meds  Problems  Med Hx  Surg Hx  Fam Hx            Review of Systems   Constitutional: Negative for chills and fever.   HENT: Negative for congestion, ear pain, hearing loss and sore throat.    Eyes: Negative for pain and visual disturbance.   Respiratory: Negative for cough and shortness of breath.    Cardiovascular: Negative for chest pain, palpitations and peripheral edema.   Gastrointestinal: Negative for abdominal pain, constipation, diarrhea, heartburn, hematochezia and nausea.   Genitourinary: Negative for dysuria, frequency, genital sores, hematuria and urgency.   Musculoskeletal: Negative for arthralgias, joint swelling and myalgias.   Skin: Negative for rash.   Neurological: Negative for dizziness,  "weakness, headaches and paresthesias.   Psychiatric/Behavioral: Negative for mood changes. The patient is not nervous/anxious.           OBJECTIVE:   /78   Pulse 76   Temp 97  F (36.1  C) (Oral)   Resp 18   Ht 1.727 m (5' 8\")   Wt 101.6 kg (224 lb)   LMP 10/19/2012   SpO2 98%   BMI 34.06 kg/m    Physical Exam  GENERAL APPEARANCE: healthy, alert and no distress  EYES: Eyes grossly normal to inspection, PERRL and conjunctivae and sclerae normal  HENT: ear canals and TM's normal, nose and mouth without ulcers or lesions, oropharynx clear and oral mucous membranes moist  NECK: no adenopathy, no asymmetry, masses, or scars and thyroid normal to palpation  RESP: lungs clear to auscultation - no rales, rhonchi or wheezes  BREAST: normal without masses, tenderness or nipple discharge and no palpable axillary masses or adenopathy  CV: regular rate and rhythm, normal S1 S2, no S3 or S4, no murmur, click or rub, no peripheral edema and peripheral pulses strong  ABDOMEN: soft, nontender, no hepatosplenomegaly, no masses and bowel sounds normal  MS: no musculoskeletal defects are noted and gait is age appropriate without ataxia  SKIN: no suspicious lesions or rashes  NEURO: Normal strength and tone, sensory exam grossly normal, mentation intact and speech normal  PSYCH: mentation appears normal and affect normal/bright    Diagnostic Test Results:  none     ASSESSMENT/PLAN:   (Z00.00) Routine general medical examination at a health care facility  (primary encounter diagnosis)  Comment: preventive needs reviewed  Plan: GLUCOSE, Lipid panel reflex to direct LDL         Fasting        see orders in Epic.       (N95.2) Atrophic vaginitis  Comment: some dryness  Plan: trial of OTC lubricants, if no help consider vag estrogen vs ERT       COUNSELING:  Reviewed preventive health counseling, as reflected in patient instructions  Special attention given to:        Regular exercise       Healthy diet/nutrition    BP Readings " "from Last 1 Encounters:   12/26/18 113/78     Estimated body mass index is 34.06 kg/m  as calculated from the following:    Height as of this encounter: 1.727 m (5' 8\").    Weight as of this encounter: 101.6 kg (224 lb).      Weight management plan: Discussed healthy diet and exercise guidelines     reports that  has never smoked. she has never used smokeless tobacco.      Counseling Resources:  ATP IV Guidelines  Pooled Cohorts Equation Calculator  Breast Cancer Risk Calculator  FRAX Risk Assessment  ICSI Preventive Guidelines  Dietary Guidelines for Americans, 2010  USDA's MyPlate  ASA Prophylaxis  Lung CA Screening    Shell Osuna MD  [facility]  "

## 2019-03-27 ENCOUNTER — OFFICE VISIT (OUTPATIENT)
Dept: DERMATOLOGY | Facility: CLINIC | Age: 59
End: 2019-03-27
Payer: COMMERCIAL

## 2019-03-27 VITALS — OXYGEN SATURATION: 97 % | DIASTOLIC BLOOD PRESSURE: 69 MMHG | HEART RATE: 83 BPM | SYSTOLIC BLOOD PRESSURE: 124 MMHG

## 2019-03-27 DIAGNOSIS — C44.519 BASAL CELL CARCINOMA (BCC) OF BACK: Primary | ICD-10-CM

## 2019-03-27 PROCEDURE — 11602 EXC TR-EXT MAL+MARG 1.1-2 CM: CPT | Performed by: DERMATOLOGY

## 2019-03-27 PROCEDURE — 88331 PATH CONSLTJ SURG 1 BLK 1SPC: CPT | Performed by: DERMATOLOGY

## 2019-03-27 NOTE — LETTER
3/27/2019         RE: Missy Kirkpatrick  [address]  [place] [address]        Dear Colleague,    Thank you for referring your patient, Missy Kirkpatrick, to the [facility]. Please see a copy of my visit note below.    Surgical Office Location :   [facility]  [address] [place] [address]      Missy Kirkpatrick is a 58 year old year old female patient here today for evaluation and managment of bc con upper back. Patient reports the following modifying factors none.  Associated symptoms: none.  Patient has no other skin complaints today.  Remainder of the HPI, Meds, PMH, Allergies, FH, and SH was reviewed in chart.      Past Medical History:   Diagnosis Date     Arthritis      Basal cell carcinoma      Factor 5 Leiden mutation, heterozygous (H)      Factor 5 Leiden mutation, heterozygous (H)      Factor 5 Leiden mutation, heterozygous (H)      Hyperlipidemia LDL goal < 100      Morbid obesity (H)      Thyroid disease February 1994    Precancerous Thyroid - 1/2 removed       Past Surgical History:   Procedure Laterality Date     ABDOMEN SURGERY  December 2012    Hysterectomy     APPENDECTOMY       BIOPSY  November 2012    utererus     C ANESTH,CLEFT PALATE REPAIR       C ANESTH,ELBOW,NOS  01/76     C REMOVE BENIGN THYROID LESION  01/2002    nodule removed      C TOTAL ABDOM HYSTERECTOMY  12/14/2012    pathology benign, no further paps needed     COLONOSCOPY  October 2012     ENT SURGERY  February 1994    Thyroid issue     ORTHOPEDIC SURGERY  01/1976    Broken [place]bow     TUBAL LIGATION          Family History   Problem Relation Age of Onset     Cerebrovascular Disease Mother      Eye Disorder Mother         glaucoma     Heart Disease Mother      Diabetes Mother      Hypertension Mother      Blood Disease Son 27        blood clots     Other Cancer Brother         Lung Cancer     Diabetes Brother      Lung Cancer Brother      Blood Disease Brother      Obesity  Brother         Bariatric surgery     Blood Disease Brother      Diabetes Brother      Blood Disease Brother      Hypertension Brother      Other Cancer Brother         Pancreatic Cancer     Pancreatic Cancer Brother      Blood Disease Brother      Melanoma No family hx of        Social History     Socioeconomic History     Marital status:      Spouse name: Not on file     Number of children: Not on file     Years of education: Not on file     Highest education level: Not on file   Occupational History     Not on file   Social Needs     Financial resource strain: Not on file     Food insecurity:     Worry: Not on file     Inability: Not on file     Transportation needs:     Medical: Not on file     Non-medical: Not on file   Tobacco Use     Smoking status: Never Smoker     Smokeless tobacco: Never Used     Tobacco comment: Nonsmoking household   Substance and Sexual Activity     Alcohol use: Yes     Comment: Occasionally     Drug use: No     Sexual activity: Yes     Partners: Male     Birth control/protection: Female Surgical     Comment: tubal ligation   Lifestyle     Physical activity:     Days per week: Not on file     Minutes per session: Not on file     Stress: Not on file   Relationships     Social connections:     Talks on phone: Not on file     Gets together: Not on file     Attends Holiness service: Not on file     Active member of club or organization: Not on file     Attends meetings of clubs or organizations: Not on file     Relationship status: Not on file     Intimate partner violence:     Fear of current or ex partner: Not on file     Emotionally abused: Not on file     Physically abused: Not on file     Forced sexual activity: Not on file   Other Topics Concern     Parent/sibling w/ CABG, MI or angioplasty before 65F 55M? Yes     Comment: Brother   Social History Narrative     Not on file       Outpatient Encounter Medications as of 3/27/2019   Medication Sig Dispense Refill     MULTIVITAMIN  TABS   OR 1 TABLET DAILY       tretinoin (RETIN-A) 0.05 % cream Spread a pea size amount into affected area topically at bedtime.  Use sunscreen SPF>20. 45 g 11     [DISCONTINUED] amoxicillin (AMOXIL) 875 MG tablet Take 1 tablet (875 mg) by mouth 2 times daily for 10 days 20 tablet 0     No facility-administered encounter medications on file as of 3/27/2019.              Review Of Systems  Skin: As above  Eyes: negative  Ears/Nose/Throat: negative  Respiratory: No shortness of breath, dyspnea on exertion, cough, or hemoptysis  Cardiovascular: negative  Gastrointestinal: negative  Genitourinary: negative  Musculoskeletal: negative  Neurologic: negative  Psychiatric: negative  Hematologic/Lymphatic/Immunologic: negative  Endocrine: negative      O:   NAD, WDWN, Alert & Oriented, Mood & Affect wnl, Vitals stable   Here today alone   /69   Pulse 83   LMP 10/19/2012   SpO2 97%    General appearance normal   Vitals stable   Alert, oriented and in no acute distress     L upper back 7mm red macule      Eyes: Conjunctivae/lids:Normal     ENT: Lips, buccal mucosa, tongue: normal    MSK:Normal    Cardiovascular: peripheral edema none    Pulm: Breathing Normal    Neuro/Psych: Orientation:Normal; Mood/Affect:Normal      MICRO:   L upper back:Unremarkable epidermis with parallel bundles of cellular collagen within the superficial dermis.  No concerning areas for malignancy.     A/P:  1. L upper back basal cell carcinoma   EXCISION OF BASAL CELL CARCINOMA, Margins confirmed with FROZEN SECTIONS AND Second intent: After thorough discussion of PGACAC, consent obtained, anesthesia and prep, the margins of the lesion were identified and an elliptical incision was made encompassing the lesion with 4mm margin. The incisions were made through the skin and down to and including the superficial dermis.  The lesion was removed en bloc and submitted for frozen section pathologic review. Clear margins obtained (1.2cm).    REPAIR  SECOND INTENT: We discussed the options for wound management in full with the patient including risks/benefits/possible outcomes. Decision made to allow the wound to heal by second intention. EBL minimal; complications none; wound care routine.  The patient was discharged in good condition and will return in one month or prn for wound evaluation.     BENIGN LESIONS DISCUSSED WITH PATIENT:  I discussed the specifics of tumor, prognosis, and genetics of benign lesions.  I explained that treatment of these lesions would be purely cosmetic and not medically neccessary.  I discussed with patient different removal options including excision, cautery and /or laser.      Nature and genetics of benign skin lesions dicussed with patient.  Signs and Symptoms of skin cancer discussed with patient.  Patient encouraged to perform monthly skin exams.  UV precautions reviewed with patient.  Patient to follow up with Primary Care provider regarding elevated blood pressure.  Skin care regimen reviewed with patient: Eliminate harsh soaps, i.e. Dial, zest, irsih spring; Mild soaps such as Cetaphil or Dove sensitive skin, avoid hot or cold showers, aggressive use of emollients including vanicream, cetaphil or cerave discussed with patient.    Risks of non-melanoma skin cancer discussed with patient   Return to clinic 6 months      Again, thank you for allowing me to participate in the care of your patient.        Sincerely,        Sal Perez MD

## 2019-03-27 NOTE — NURSING NOTE
"Initial /69   Pulse 83   LMP 10/19/2012   SpO2 97%  Estimated body mass index is 34.06 kg/m  as calculated from the following:    Height as of 12/26/18: 1.727 m (5' 8\").    Weight as of 12/26/18: 101.6 kg (224 lb). .    Hillary Barrett LPN    "

## 2019-03-27 NOTE — PATIENT INSTRUCTIONS
Open Wound Care     for Left Upper Back        ? No strenuous activity for 48 hours    ? Take Tylenol as needed for discomfort.                                                .         ? Do not drink alcoholic beverages for 48 hours.    ? Keep the pressure bandage in place for 24 hours. If the bandage becomes blood tinged or loose, reinforce it with gauze and tape.        (Refer to the reverse side of this page for management of bleeding).    ? Remove bandage in 24 hours and begin wound care as follows:     1. Clean area with tap water using a Q tip or gauze pad, (shower / bathe normally)  2. Dry wound with Q tip or gauze pad  3. Apply Aquaphor, Vaseline, Polysporin or Bacitracin Ointment with a Q tip    Do NOT use Neosporin Ointment *  4. Cover the wound with a band-aid or nonstick gauze pad and paper tape.  5. Repeat wound care once a day until wound is completely healed.    It is an old wives tale that a wound heals better when it is exposed to air and allowed to dry out. The wound will heal faster with a better cosmetic result if it is kept moist with ointment and covered with a bandage.  Do not let the wound dry out.      Supplies Needed:                Qtips or gauze pads                Polysporin or Bacitracin Ointment                Bandaids or nonstick gauze pads and paper tape    Wound care kits and brown paper tape are available for purchase at   the pharmacy.    BLEEDIN. Use tightly rolled up gauze or cloth to apply direct pressure over the bandage for 20   minutes.  2. Reapply pressure for an additional 20 minutes if necessary  3. Call the office or go to the nearest emergency room if pressure fails to stop the bleeding.  4. Use additional gauze and tape to maintain pressure once the bleeding has stopped.  5. Begin wound care 24 hours after surgery as directed.                  WOUND HEALING    1. One week after surgery a pink / red halo will form around the outside of the wound.   This is new  skin.  2. The center of the wound will appear yellowish white and produce some drainage.  3. The pink halo will slowly migrate in toward the center of the wound until the wound is covered with new shiny pink skin.  4. There will be no more drainage when the wound is completely healed.  5. It will take six months to one year for the redness to fade.  6. The scar may be itchy, tight and sensitive to extreme temperatures for a year after the surgery.  7. Massaging the area several times a day for several minutes after the wound is completely healed will help the scar soften and normalize faster. Begin massage only after healing is complete.      In case of emergency call: Dr Perez: [phone]     [facility]: [phone]    [facility]: [phone]

## 2019-03-27 NOTE — PROGRESS NOTES
Missy Kirkpatrick is a 58 year old year old female patient here today for evaluation and managment of bc con upper back. Patient reports the following modifying factors none.  Associated symptoms: none.  Patient has no other skin complaints today.  Remainder of the HPI, Meds, PMH, Allergies, FH, and SH was reviewed in chart.      Past Medical History:   Diagnosis Date     Arthritis      Basal cell carcinoma      Factor 5 Leiden mutation, heterozygous (H)      Factor 5 Leiden mutation, heterozygous (H)      Factor 5 Leiden mutation, heterozygous (H)      Hyperlipidemia LDL goal < 100      Morbid obesity (H)      Thyroid disease February 1994    Precancerous Thyroid - 1/2 removed       Past Surgical History:   Procedure Laterality Date     ABDOMEN SURGERY  December 2012    Hysterectomy     APPENDECTOMY       BIOPSY  November 2012    utererus     C ANESTH,CLEFT PALATE REPAIR       C ANESTH,ELBOW,NOS  01/76     C REMOVE BENIGN THYROID LESION  01/2002    nodule removed      C TOTAL ABDOM HYSTERECTOMY  12/14/2012    pathology benign, no further paps needed     COLONOSCOPY  October 2012     ENT SURGERY  February 1994    Thyroid issue     ORTHOPEDIC SURGERY  01/1976    Broken [place]bow     TUBAL LIGATION          Family History   Problem Relation Age of Onset     Cerebrovascular Disease Mother      Eye Disorder Mother         glaucoma     Heart Disease Mother      Diabetes Mother      Hypertension Mother      Blood Disease Son 27        blood clots     Other Cancer Brother         Lung Cancer     Diabetes Brother      Lung Cancer Brother      Blood Disease Brother      Obesity Brother         Bariatric surgery     Blood Disease Brother      Diabetes Brother      Blood Disease Brother      Hypertension Brother      Other Cancer Brother         Pancreatic Cancer     Pancreatic Cancer Brother      Blood Disease Brother      Melanoma No family hx of        Social History     Socioeconomic History     Marital status:       Spouse name: Not on file     Number of children: Not on file     Years of education: Not on file     Highest education level: Not on file   Occupational History     Not on file   Social Needs     Financial resource strain: Not on file     Food insecurity:     Worry: Not on file     Inability: Not on file     Transportation needs:     Medical: Not on file     Non-medical: Not on file   Tobacco Use     Smoking status: Never Smoker     Smokeless tobacco: Never Used     Tobacco comment: Nonsmoking household   Substance and Sexual Activity     Alcohol use: Yes     Comment: Occasionally     Drug use: No     Sexual activity: Yes     Partners: Male     Birth control/protection: Female Surgical     Comment: tubal ligation   Lifestyle     Physical activity:     Days per week: Not on file     Minutes per session: Not on file     Stress: Not on file   Relationships     Social connections:     Talks on phone: Not on file     Gets together: Not on file     Attends Yazidi service: Not on file     Active member of club or organization: Not on file     Attends meetings of clubs or organizations: Not on file     Relationship status: Not on file     Intimate partner violence:     Fear of current or ex partner: Not on file     Emotionally abused: Not on file     Physically abused: Not on file     Forced sexual activity: Not on file   Other Topics Concern     Parent/sibling w/ CABG, MI or angioplasty before 65F 55M? Yes     Comment: Brother   Social History Narrative     Not on file       Outpatient Encounter Medications as of 3/27/2019   Medication Sig Dispense Refill     MULTIVITAMIN TABS   OR 1 TABLET DAILY       tretinoin (RETIN-A) 0.05 % cream Spread a pea size amount into affected area topically at bedtime.  Use sunscreen SPF>20. 45 g 11     [DISCONTINUED] amoxicillin (AMOXIL) 875 MG tablet Take 1 tablet (875 mg) by mouth 2 times daily for 10 days 20 tablet 0     No facility-administered encounter medications on file as of  3/27/2019.              Review Of Systems  Skin: As above  Eyes: negative  Ears/Nose/Throat: negative  Respiratory: No shortness of breath, dyspnea on exertion, cough, or hemoptysis  Cardiovascular: negative  Gastrointestinal: negative  Genitourinary: negative  Musculoskeletal: negative  Neurologic: negative  Psychiatric: negative  Hematologic/Lymphatic/Immunologic: negative  Endocrine: negative      O:   NAD, WDWN, Alert & Oriented, Mood & Affect wnl, Vitals stable   Here today alone   /69   Pulse 83   LMP 10/19/2012   SpO2 97%    General appearance normal   Vitals stable   Alert, oriented and in no acute distress     L upper back 7mm red macule      Eyes: Conjunctivae/lids:Normal     ENT: Lips, buccal mucosa, tongue: normal    MSK:Normal    Cardiovascular: peripheral edema none    Pulm: Breathing Normal    Neuro/Psych: Orientation:Normal; Mood/Affect:Normal      MICRO:   L upper back:Unremarkable epidermis with parallel bundles of cellular collagen within the superficial dermis.  No concerning areas for malignancy.     A/P:  1. L upper back basal cell carcinoma   EXCISION OF BASAL CELL CARCINOMA, Margins confirmed with FROZEN SECTIONS AND Second intent: After thorough discussion of PGACAC, consent obtained, anesthesia and prep, the margins of the lesion were identified and an elliptical incision was made encompassing the lesion with 4mm margin. The incisions were made through the skin and down to and including the superficial dermis.  The lesion was removed en bloc and submitted for frozen section pathologic review. Clear margins obtained (1.2cm).    REPAIR SECOND INTENT: We discussed the options for wound management in full with the patient including risks/benefits/possible outcomes. Decision made to allow the wound to heal by second intention. EBL minimal; complications none; wound care routine.  The patient was discharged in good condition and will return in one month or prn for wound evaluation.      BENIGN LESIONS DISCUSSED WITH PATIENT:  I discussed the specifics of tumor, prognosis, and genetics of benign lesions.  I explained that treatment of these lesions would be purely cosmetic and not medically neccessary.  I discussed with patient different removal options including excision, cautery and /or laser.      Nature and genetics of benign skin lesions dicussed with patient.  Signs and Symptoms of skin cancer discussed with patient.  Patient encouraged to perform monthly skin exams.  UV precautions reviewed with patient.  Patient to follow up with Primary Care provider regarding elevated blood pressure.  Skin care regimen reviewed with patient: Eliminate harsh soaps, i.e. Dial, zest, irsih spring; Mild soaps such as Cetaphil or Dove sensitive skin, avoid hot or cold showers, aggressive use of emollients including vanicream, cetaphil or cerave discussed with patient.    Risks of non-melanoma skin cancer discussed with patient   Return to clinic 6 months

## 2019-09-23 ENCOUNTER — OFFICE VISIT (OUTPATIENT)
Dept: DERMATOLOGY | Facility: CLINIC | Age: 59
End: 2019-09-23
Payer: COMMERCIAL

## 2019-09-23 VITALS — HEART RATE: 69 BPM | DIASTOLIC BLOOD PRESSURE: 79 MMHG | SYSTOLIC BLOOD PRESSURE: 123 MMHG | OXYGEN SATURATION: 97 %

## 2019-09-23 DIAGNOSIS — L82.1 SEBORRHEIC KERATOSIS: ICD-10-CM

## 2019-09-23 DIAGNOSIS — L81.4 LENTIGO: ICD-10-CM

## 2019-09-23 DIAGNOSIS — Z85.828 HISTORY OF BASAL CELL CARCINOMA (BCC) OF SKIN: ICD-10-CM

## 2019-09-23 DIAGNOSIS — D18.01 ANGIOMA OF SKIN: ICD-10-CM

## 2019-09-23 DIAGNOSIS — D48.5 NEOPLASM OF UNCERTAIN BEHAVIOR OF SKIN: Primary | ICD-10-CM

## 2019-09-23 DIAGNOSIS — D22.9 NEVUS: ICD-10-CM

## 2019-09-23 PROCEDURE — 11102 TANGNTL BX SKIN SINGLE LES: CPT | Performed by: PHYSICIAN ASSISTANT

## 2019-09-23 PROCEDURE — 99214 OFFICE O/P EST MOD 30 MIN: CPT | Mod: 25 | Performed by: PHYSICIAN ASSISTANT

## 2019-09-23 PROCEDURE — 88305 TISSUE EXAM BY PATHOLOGIST: CPT | Mod: TC | Performed by: PHYSICIAN ASSISTANT

## 2019-09-23 NOTE — PATIENT INSTRUCTIONS
Wound Care Instructions     FOR SUPERFICIAL WOUNDS     [facility] [phone]    [facility] [phone]                    Left lower breast  AFTER 24 HOURS YOU SHOULD REMOVE THE BANDAGE AND BEGIN DAILY DRESSING CHANGES AS FOLLOWS:     1) Remove Dressing.     2) Clean and dry the area with tap water using a Q-tip or sterile gauze pad.     3) Apply Vaseline, Aquaphor, Polysporin ointment or Bacitracin ointment over entire wound.  Do NOT use Neosporin ointment.     4) Cover the wound with a band-aid, or a sterile non-stick gauze pad and micropore paper tape    REPEAT THESE INSTRUCTIONS AT LEAST ONCE A DAY UNTIL THE WOUND HAS COMPLETELY HEALED.    It is an old wives tale that a wound heals better when it is exposed to air and allowed to dry out. The wound will heal faster with a better cosmetic result if it is kept moist with ointment and covered with a bandage.    **Do not let the wound dry out.**    Supplies Needed:      *Cotton tipped applicators (Q-tips)    *Polysporin Ointment or Bacitracin Ointment (NOT NEOSPORIN)    *Band-aids or non-stick gauze pads and micropore paper tape.    PATIENT INFORMATION:    During the healing process you will notice a number of changes. All wounds develop a small halo of redness surrounding the wound.  This means healing is occurring. Severe itching with extensive redness usually indicates sensitivity to the ointment or bandage tape used to dress the wound.  You should call our office if this develops.      Swelling  and/or discoloration around your surgical site is common, particularly when performed around the eye.    All wounds normally drain.  The larger the wound the more drainage there will be.  After 7-10 days, you will notice the wound beginning to shrink and new skin will begin to grow.  The wound is healed when you can see skin has formed over the entire area.  A healed wound has a healthy, shiny look to the surface and is red to dark pink in  color to normalize.  Wounds may take approximately 4-6 weeks to heal.  Larger wounds may take 6-8 weeks.  After the wound is healed you may discontinue dressing changes.    You may experience a sensation of tightness as your wound heals. This is normal and will gradually subside.    Your healed wound may be sensitive to temperature changes. This sensitivity improves with time, but if you re having a lot of discomfort, try to avoid temperature extremes.    Patients frequently experience itching after their wound appears to have healed because of the continue healing under the skin.  Plain Vaseline will help relieve the itching.    POSSIBLE COMPLICATIONS    BLEEDIN. Leave the bandage in place.  2. Use tightly rolled up gauze or a cloth to apply direct pressure over the bandage for 30  minutes.  3. Reapply pressure for an additional 30 minutes if necessary  4. Use additional gauze and tape to maintain pressure once the bleeding has stopped.

## 2019-09-23 NOTE — LETTER
9/23/2019         RE: Missy Kirkpatrick  [address]  [place] [address]        Dear Colleague,    Thank you for referring your patient, Missy Kirkpatrick, to the [facility]. Please see a copy of my visit note below.    HPI:   Chief complaints: Missy Kirkpatrick is a 59 year old female who presents for Full skin cancer screening to rule out skin cancer   Last Skin Exam: about 1 year ago      1st Baseline: no  Personal HX of Skin Cancer: Yes  BCC on the upper back 3/2019  Personal HX of Malignant Melanoma: no   Family HX of Skin Cancer / Malignant Melanoma: no  Personal HX of Atypical Moles:   no  Risk factors: history of frequent burns, tans and sun exposure. 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  See patient intake sheet.  ROS of the following were done and are negative: Constitutional, Eyes, Ears, Nose,   Mouth, Throat, Cardiovascular, Respiratory, GI, Genitourinary, Musculoskeletal,   Psychiatric, Endocrine, Allergic/Immunologic.    PHYSICAL EXAM:   /79 (BP Location: Left arm, Patient Position: Sitting, Cuff Size: Adult Large)   Pulse 69   LMP 10/19/2012   SpO2 97%   Skin exam performed as follows: Type 2 skin. Mood appropriate  Alert and Oriented X 3. Well developed, well nourished in no distress.  General appearance: Normal  Head including face: Normal  Eyes: conjunctiva and lids: Normal  Mouth: Lips, teeth, gums: Normal  Neck: Normal  Chest-breast/axillae: Normal  Back: Normal  Spleen and liver: Normal  Cardiovascular: Exam of peripheral vascular system by observation for swelling, varicosities, edema: Normal  Genitalia: groin, buttocks: Normal  Extremities: digits/nails (clubbing): Normal  Eccrine and Apocrine glands: Normal  Right upper extremity: Normal  Left  upper extremity: Normal  Right lower extremity: Normal  Left lower extremity: Normal  Skin: Scalp and body hair: See below    Pt deferred exam of breasts, groin, buttocks: No    Other physical findings:  1. Multiple pigmented macules on extremities and trunk  2. Multiple pigmented macules on face, trunk and extremities  3. Multiple vascular papules on trunk, arms and legs  4. Multiple scattered keratotic plaques  5. 5 mm brown fleshy papule on the left inferior breast       Except as noted above, no other signs of skin cancer or melanoma.     ASSESSMENT/PLAN:   Benign Full skin cancer screening today. . Patient with history of BCC  Advised on monthly self exams and 1 year  Patient Education: Appropriate brochures given.    1. Multiple benign appearing nevi on arms, legs and trunk. Discussed ABCDEs of melanoma and sunscreen.   2. Multiple lentigos on arms, legs and trunk. Advised benign, no treatment needed.  3. Multiple scattered angiomas. Advised benign, no treatment needed.   4. Seborrheic keratosis on arms, legs and trunk. Advised benign, no treatment needed.  5. Dermal nevus r/o atypicality on the left inferior breast. Shave bx in typical fashion .  Area cleaned with betadyne and anesthetized with 1% lidocaine with epi .  Dermablade used to remove the lesion and sent to pathology. Bleeding was cauterized. Pt tolerated procedure well.  6. Missy to follow up with Primary Care provider regarding elevated blood pressure.            Follow-up: yearly FSE/PRN sooner    1.) Patient was asked about new and changing moles. YES  2.) Patient received a complete physical skin examination: YES  3.) Patient was counseled to perform a monthly self skin examination: YES  Scribed By: Brandy Hernandez, MS, PAISHA        Again, thank you for allowing me to participate in the care of your patient.        Sincerely,        Brandy Hernandez PA-C

## 2019-09-23 NOTE — PROGRESS NOTES
HPI:   Chief complaints: Missy Kirkpatrick is a 59 year old female who presents for Full skin cancer screening to rule out skin cancer   Last Skin Exam: about 1 year ago      1st Baseline: no  Personal HX of Skin Cancer: Yes  BCC on the upper back 3/2019  Personal HX of Malignant Melanoma: no   Family HX of Skin Cancer / Malignant Melanoma: no  Personal HX of Atypical Moles:   no  Risk factors: history of frequent burns, tans and sun exposure. 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  See patient intake sheet.  ROS of the following were done and are negative: Constitutional, Eyes, Ears, Nose,   Mouth, Throat, Cardiovascular, Respiratory, GI, Genitourinary, Musculoskeletal,   Psychiatric, Endocrine, Allergic/Immunologic.    PHYSICAL EXAM:   /79 (BP Location: Left arm, Patient Position: Sitting, Cuff Size: Adult Large)   Pulse 69   LMP 10/19/2012   SpO2 97%   Skin exam performed as follows: Type 2 skin. Mood appropriate  Alert and Oriented X 3. Well developed, well nourished in no distress.  General appearance: Normal  Head including face: Normal  Eyes: conjunctiva and lids: Normal  Mouth: Lips, teeth, gums: Normal  Neck: Normal  Chest-breast/axillae: Normal  Back: Normal  Spleen and liver: Normal  Cardiovascular: Exam of peripheral vascular system by observation for swelling, varicosities, edema: Normal  Genitalia: groin, buttocks: Normal  Extremities: digits/nails (clubbing): Normal  Eccrine and Apocrine glands: Normal  Right upper extremity: Normal  Left upper extremity: Normal  Right lower extremity: Normal  Left lower extremity: Normal  Skin: Scalp and body hair: See below    Pt deferred exam of breasts, groin, buttocks: No    Other physical findings:  1. Multiple pigmented macules on extremities  and trunk  2. Multiple pigmented macules on face, trunk and extremities  3. Multiple vascular papules on trunk, arms and legs  4. Multiple scattered keratotic plaques  5. 5 mm brown fleshy papule on the left inferior breast       Except as noted above, no other signs of skin cancer or melanoma.     ASSESSMENT/PLAN:   Benign Full skin cancer screening today. . Patient with history of BCC  Advised on monthly self exams and 1 year  Patient Education: Appropriate brochures given.    1. Multiple benign appearing nevi on arms, legs and trunk. Discussed ABCDEs of melanoma and sunscreen.   2. Multiple lentigos on arms, legs and trunk. Advised benign, no treatment needed.  3. Multiple scattered angiomas. Advised benign, no treatment needed.   4. Seborrheic keratosis on arms, legs and trunk. Advised benign, no treatment needed.  5. Dermal nevus r/o atypicality on the left inferior breast. Shave bx in typical fashion .  Area cleaned with betadyne and anesthetized with 1% lidocaine with epi .  Dermablade used to remove the lesion and sent to pathology. Bleeding was cauterized. Pt tolerated procedure well.  6. Missy to follow up with Primary Care provider regarding elevated blood pressure.            Follow-up: yearly FSE/PRN sooner    1.) Patient was asked about new and changing moles. YES  2.) Patient received a complete physical skin examination: YES  3.) Patient was counseled to perform a monthly self skin examination: YES  Scribed By: Brandy Hernandez MS, PATawnyaC

## 2019-09-25 LAB — COPATH REPORT: NORMAL

## 2019-11-03 ENCOUNTER — HEALTH MAINTENANCE LETTER (OUTPATIENT)
Age: 59
End: 2019-11-03

## 2020-02-10 ENCOUNTER — HEALTH MAINTENANCE LETTER (OUTPATIENT)
Age: 60
End: 2020-02-10

## 2020-02-28 ENCOUNTER — MYC REFILL (OUTPATIENT)
Dept: DERMATOLOGY | Facility: CLINIC | Age: 60
End: 2020-02-28

## 2020-02-28 DIAGNOSIS — L98.8 AGE-RELATED FACIAL WRINKLES: ICD-10-CM

## 2020-02-28 RX ORDER — TRETINOIN 0.5 MG/G
CREAM TOPICAL
Qty: 45 G | Refills: 6 | Status: SHIPPED | OUTPATIENT
Start: 2020-02-28 | End: 2023-12-01

## 2020-03-09 ENCOUNTER — DOCUMENTATION ONLY (OUTPATIENT)
Dept: LAB | Facility: CLINIC | Age: 60
End: 2020-03-09

## 2020-03-09 DIAGNOSIS — Z13.6 CARDIOVASCULAR SCREENING; LDL GOAL LESS THAN 160: Primary | ICD-10-CM

## 2020-03-09 DIAGNOSIS — Z13.1 SCREENING FOR DIABETES MELLITUS: ICD-10-CM

## 2020-03-09 NOTE — PROGRESS NOTES
Please review and sign Pending Pre-visit Labs in [facility]. Labs 03/10/20 and physical 03/12/20   Nitza GUILLEN

## 2020-03-10 DIAGNOSIS — Z13.1 SCREENING FOR DIABETES MELLITUS: ICD-10-CM

## 2020-03-10 DIAGNOSIS — Z13.6 CARDIOVASCULAR SCREENING; LDL GOAL LESS THAN 160: ICD-10-CM

## 2020-03-10 LAB
CHOLEST SERPL-MCNC: 212 MG/DL
GLUCOSE SERPL-MCNC: 98 MG/DL (ref 70–99)
HDLC SERPL-MCNC: 68 MG/DL
LDLC SERPL CALC-MCNC: 129 MG/DL
NONHDLC SERPL-MCNC: 144 MG/DL
TRIGL SERPL-MCNC: 74 MG/DL

## 2020-03-10 PROCEDURE — 82947 ASSAY GLUCOSE BLOOD QUANT: CPT | Performed by: FAMILY MEDICINE

## 2020-03-10 PROCEDURE — 36415 COLL VENOUS BLD VENIPUNCTURE: CPT | Performed by: FAMILY MEDICINE

## 2020-03-10 PROCEDURE — 80061 LIPID PANEL: CPT | Performed by: FAMILY MEDICINE

## 2020-03-10 NOTE — RESULT ENCOUNTER NOTE
Missy,  Here are your lab results.  We will discuss these at your upcoming visit.   See you soon.  Shell Osuna MD

## 2020-07-13 NOTE — PROGRESS NOTES
SUBJECTIVE:   CC: Missy Kirkpatrick is an 59 year old woman who presents for preventive health visit.     Healthy Habits:     Getting at least 3 servings of Calcium per day:  Yes    Bi-annual eye exam:  NO    Dental care twice a year:  Yes    Sleep apnea or symptoms of sleep apnea:  None    Diet:  Regular (no restrictions)    Frequency of exercise:  2-3 days/week    Duration of exercise:  15-30 minutes    Taking medications regularly:  Not Applicable    Medication side effects:  None    PHQ-2 Total Score: 0    Additional concerns today:  No              Today's PHQ-2 Score:   PHQ-2 ( 1999 Pf[employer]) 7/22/2020   Q1: Little interest or pleasure in doing things 0   Q2: Feeling down, depressed or hopeless 0   PHQ-2 Score 0   Q1: Little interest or pleasure in doing things Not at all   Q2: Feeling down, depressed or hopeless Not at all   PHQ-2 Score 0       Abuse: Current or Past(Physical, Sexual or Emotional)- No  Do you feel safe in your environment? Yes    Have you ever done Advance Care Planning? (For example, a Health Directive, POLST, or a discussion with a medical provider or your loved ones about your wishes): No, advance care planning information given to patient to review.  Patient plans to discuss their wishes with loved ones or provider.      Social History     Tobacco Use     Smoking status: Never Smoker     Smokeless tobacco: Never Used     Tobacco comment: Nonsmoking household   Substance Use Topics     Alcohol use: Yes     Comment: Occasionally           Reviewed orders with patient.  Reviewed health maintenance and updated orders accordingly - Yes    G 3 P 3   Patient's last menstrual period was 10/19/2012.     Fasting: No   Td:tdap 2009 overdue       Status post benign hysterectomy. Health Maintenance and Surgical History updated.               Cholesterol:   Lab Results   Component Value Date    CHOL 212 03/10/2020     Lab Results   Component Value Date    HDL 68 03/10/2020     Lab Results    Component Value Date     03/10/2020     Lab Results   Component Value Date    TRIG 74 03/10/2020     Lab Results   Component Value Date    CHOLHDLRATIO 3.2 10/21/2015         MM/18  Dexa:  NA     Flex/colo: 12/10 due      Seat Belt: Yes    Sunscreen use: Yes   Calcium Intake: adeq  Health Care Directive: No  Sexually Active: Yes     Current contraception: hysterectomy  History of abnormal Pap smear: No  Family history of colon/breast/ovarian cancer: No  Regular self breast exam: Yes  History of abnormal mammogram: No      Labs reviewed in EPIC  BP Readings from Last 3 Encounters:   20 122/82   19 123/79   19 124/69    Wt Readings from Last 3 Encounters:   20 102.8 kg (226 lb 9.6 oz)   18 101.6 kg (224 lb)   18 99.8 kg (220 lb)                  Patient Active Problem List   Diagnosis     CARDIOVASCULAR SCREENING; LDL GOAL LESS THAN 160     Female stress incontinence     Factor 5 Leiden mutation, heterozygous (H)     Multiple thyroid nodules     S/P partial thyroidectomy     Advanced directives, counseling/discussion     Right rotator cuff tendonitis     Complete tear of right rotator cuff     Past Surgical History:   Procedure Laterality Date     ABDOMEN SURGERY  2012    Hysterectomy     APPENDECTOMY       BIOPSY  2012    utererus     C ANESTH,CLEFT PALATE REPAIR       C ANESTH,ELBOW,NOS       C REMOVE BENIGN THYROID LESION  2002    nodule removed      C TOTAL ABDOM HYSTERECTOMY  2012    pathology benign, no further paps needed     COLONOSCOPY  2012     ENT SURGERY  1994    Thyroid issue     ORTHOPEDIC SURGERY  1976    [place]     TUBAL LIGATION         Social History     Tobacco Use     Smoking status: Never Smoker     Smokeless tobacco: Never Used     Tobacco comment: Nonsmoking household   Substance Use Topics     Alcohol use: Yes     Comment: Occasionally     Family History   Problem Relation Age of Onset      Cerebrovascular Disease Mother      Eye Disorder Mother         glaucoma     Heart Disease Mother      Diabetes Mother      Hypertension Mother      Blood Disease Son 27        blood clots     Osteoporosis Son         July 2020     Other Cancer Brother         Lung Cancer     Diabetes Brother      Lung Cancer Brother      Blood Disease Brother      Obesity Brother         Bariatric surgery     Blood Disease Brother      Diabetes Brother      Blood Disease Brother      Hypertension Brother      Other Cancer Brother         Pancreatic Cancer     Pancreatic Cancer Brother      Blood Disease Brother      Melanoma No family hx of          Current Outpatient Medications   Medication Sig Dispense Refill     MULTIVITAMIN TABS   OR 1 TABLET DAILY       tretinoin (RETIN-A) 0.05 % external cream Spread a pea size amount into affected area topically at bedtime.  Use sunscreen SPF>20. 45 g 6       Mammogram Screening: Patient over age 50, mutual decision to screen reflected in health maintenance.    Pertinent mammograms are reviewed under the imaging tab.  Reviewed and updated as needed this visit by clinical staff  Tobacco  Allergies  Meds  Problems  Med Hx  Surg Hx  Fam Hx  Soc Hx          Reviewed and updated as needed this visit by Provider  Tobacco  Allergies  Meds  Problems  Med Hx  Surg Hx  Fam Hx            Review of Systems   Constitutional: Negative for chills and fever.   HENT: Negative for congestion, ear pain, hearing loss and sore throat.    Eyes: Negative for pain and visual disturbance.   Respiratory: Negative for cough and shortness of breath.    Cardiovascular: Negative for chest pain, palpitations and peripheral edema.   Gastrointestinal: Negative for abdominal pain, constipation, diarrhea, heartburn, hematochezia and nausea.   Breasts:  Negative for tenderness, breast mass and discharge.   Genitourinary: Positive for urgency. Negative for dysuria, frequency, genital sores, hematuria, pelvic  "pain, vaginal bleeding and vaginal discharge.   Musculoskeletal: Negative for arthralgias, joint swelling and myalgias.   Skin: Negative for rash.   Neurological: Negative for dizziness, weakness, headaches and paresthesias.   Psychiatric/Behavioral: Negative for mood changes. The patient is not nervous/anxious.           OBJECTIVE:   /82   Pulse 63   Temp 97.3  F (36.3  C) (Oral)   Resp 18   Ht 1.702 m (5' 7\")   Wt 102.8 kg (226 lb 9.6 oz)   LMP 10/19/2012   SpO2 99%   BMI 35.49 kg/m    Physical Exam  GENERAL APPEARANCE: healthy, alert and no distress  EYES: Eyes grossly normal to inspection, PERRL and conjunctivae and sclerae normal  HENT: ear canals and TM's normal, nose and mouth without ulcers or lesions, oropharynx clear and oral mucous membranes moist  NECK: no adenopathy, no asymmetry, masses, or scars and thyroid normal to palpation  RESP: lungs clear to auscultation - no rales, rhonchi or wheezes  BREAST: normal without masses, tenderness or nipple discharge and no palpable axillary masses or adenopathy  CV: regular rate and rhythm, normal S1 S2, no S3 or S4, no murmur, click or rub, no peripheral edema and peripheral pulses strong  ABDOMEN: soft, nontender, no hepatosplenomegaly, no masses and bowel sounds normal  MS: no musculoskeletal defects are noted and gait is age appropriate without ataxia  SKIN: no suspicious lesions or rashes  NEURO: Normal strength and tone, sensory exam grossly normal, mentation intact and speech normal  PSYCH: mentation appears normal and affect normal/bright    Diagnostic Test Results:  Labs reviewed in Epic    ASSESSMENT/PLAN:   (Z00.00) Routine general medical examination at a health care facility  (primary encounter diagnosis)  Comment: preventive needs reviewed   Plan:      ADMIN VACCINE, FIRST        see orders in Epic.     (D68.51) Factor 5 Leiden mutation, heterozygous (H)  Comment: stable, not on anticoag  Plan: Reviewed s/sx requiring immediate " "evaluation.     (R39.15) Urinary urgency  Comment: worsening and impacting life  Plan: oxybutynin ER (DITROPAN-XL) 5 MG 24 hr tablet        Trial of oxybutynin, taper dose.    (Z98.890) S/P partial thyroidectomy  Comment: needs yearly check  Plan: TSH with free T4 reflex            (Z23) Need for vaccination  Comment: due for tdap   Plan: done    COUNSELING:  Reviewed preventive health counseling, as reflected in patient instructions  Special attention given to:        Regular exercise       Healthy diet/nutrition    Estimated body mass index is 35.49 kg/m  as calculated from the following:    Height as of this encounter: 1.702 m (5' 7\").    Weight as of this encounter: 102.8 kg (226 lb 9.6 oz).    Weight management plan: Discussed healthy diet and exercise guidelines     reports that she has never smoked. She has never used smokeless tobacco.      Counseling Resources:  ATP IV Guidelines  Pooled Cohorts Equation Calculator  Breast Cancer Risk Calculator  FRAX Risk Assessment  ICSI Preventive Guidelines  Dietary Guidelines for Americans, 2010  USDA's MyPlate  ASA Prophylaxis  Lung CA Screening    Shell Osuna MD  [facility]  "

## 2020-07-13 NOTE — PATIENT INSTRUCTIONS
Trial of oxybutynin for urgency. Alyssa Osuna MD before you refill if you need a dose adjustment.    Preventive Health Recommendations  Female Ages 50 - 64    Yearly exam: See your health care provider every year in order to  o Review health changes.   o Discuss preventive care.    o Review your medicines if your doctor has prescribed any.      Get a Pap test every three years (unless you have an abnormal result and your provider advises testing more often).    If you get Pap tests with HPV test, you only need to test every 5 years, unless you have an abnormal result.     You do not need a Pap test if your uterus was removed (hysterectomy) and you have not had cancer.    You should be tested each year for STDs (sexually transmitted diseases) if you're at risk.     Have a mammogram every 1 to 2 years.    Have a colonoscopy at age 50, or have a yearly FIT test (stool test). These exams screen for colon cancer.      Have a cholesterol test every 5 years, or more often if advised.    Have a diabetes test (fasting glucose) every three years. If you are at risk for diabetes, you should have this test more often.     If you are at risk for osteoporosis (brittle bone disease), think about having a bone density scan (DEXA).    Shots: Get a flu shot each year. Get a tetanus shot every 10 years.    Nutrition:     Eat at least 5 servings of fruits and vegetables each day.    Eat whole-grain bread, whole-wheat pasta and brown rice instead of white grains and rice.    Get adequate Calcium and Vitamin D.     Lifestyle    Exercise at least 150 minutes a week (30 minutes a day, 5 days a week). This will help you control your weight and prevent disease.    Limit alcohol to one drink per day.    No smoking.     Wear sunscreen to prevent skin cancer.     See your dentist every six months for an exam and cleaning.    See your eye doctor every 1 to 2 years.

## 2020-07-22 ASSESSMENT — ENCOUNTER SYMPTOMS
PARESTHESIAS: 0
EYE PAIN: 0
SHORTNESS OF BREATH: 0
SORE THROAT: 0
JOINT SWELLING: 0
PALPITATIONS: 0
HEARTBURN: 0
DYSURIA: 0
CONSTIPATION: 0
BREAST MASS: 0
CHILLS: 0
MYALGIAS: 0
NAUSEA: 0
WEAKNESS: 0
COUGH: 0
HEADACHES: 0
HEMATURIA: 0
ARTHRALGIAS: 0
DIARRHEA: 0
HEMATOCHEZIA: 0
NERVOUS/ANXIOUS: 0
FREQUENCY: 0
ABDOMINAL PAIN: 0
DIZZINESS: 0
FEVER: 0

## 2020-07-23 ENCOUNTER — OFFICE VISIT (OUTPATIENT)
Dept: FAMILY MEDICINE | Facility: CLINIC | Age: 60
End: 2020-07-23
Payer: COMMERCIAL

## 2020-07-23 VITALS
DIASTOLIC BLOOD PRESSURE: 82 MMHG | HEIGHT: 67 IN | OXYGEN SATURATION: 99 % | SYSTOLIC BLOOD PRESSURE: 122 MMHG | HEART RATE: 63 BPM | RESPIRATION RATE: 18 BRPM | TEMPERATURE: 97.3 F | BODY MASS INDEX: 35.56 KG/M2 | WEIGHT: 226.6 LBS

## 2020-07-23 DIAGNOSIS — Z23 NEED FOR VACCINATION: ICD-10-CM

## 2020-07-23 DIAGNOSIS — R39.15 URINARY URGENCY: ICD-10-CM

## 2020-07-23 DIAGNOSIS — Z00.00 ROUTINE GENERAL MEDICAL EXAMINATION AT A HEALTH CARE FACILITY: Primary | ICD-10-CM

## 2020-07-23 DIAGNOSIS — E89.0 S/P PARTIAL THYROIDECTOMY: ICD-10-CM

## 2020-07-23 DIAGNOSIS — D68.51 FACTOR 5 LEIDEN MUTATION, HETEROZYGOUS (H): ICD-10-CM

## 2020-07-23 LAB — TSH SERPL DL<=0.005 MIU/L-ACNC: 0.98 MU/L (ref 0.4–4)

## 2020-07-23 PROCEDURE — 90715 TDAP VACCINE 7 YRS/> IM: CPT | Performed by: FAMILY MEDICINE

## 2020-07-23 PROCEDURE — 36415 COLL VENOUS BLD VENIPUNCTURE: CPT | Performed by: FAMILY MEDICINE

## 2020-07-23 PROCEDURE — 90471 IMMUNIZATION ADMIN: CPT | Performed by: FAMILY MEDICINE

## 2020-07-23 PROCEDURE — 84443 ASSAY THYROID STIM HORMONE: CPT | Performed by: FAMILY MEDICINE

## 2020-07-23 PROCEDURE — 99396 PREV VISIT EST AGE 40-64: CPT | Mod: 25 | Performed by: FAMILY MEDICINE

## 2020-07-23 PROCEDURE — 99213 OFFICE O/P EST LOW 20 MIN: CPT | Mod: 25 | Performed by: FAMILY MEDICINE

## 2020-07-23 RX ORDER — OXYBUTYNIN CHLORIDE 5 MG/1
5 TABLET, EXTENDED RELEASE ORAL DAILY
Qty: 30 TABLET | Refills: 1 | Status: SHIPPED | OUTPATIENT
Start: 2020-07-23 | End: 2022-01-09

## 2020-07-23 ASSESSMENT — MIFFLIN-ST. JEOR: SCORE: 1630.48

## 2020-07-23 NOTE — NURSING NOTE
"Chief Complaint   Patient presents with     Physical       Initial /82   Pulse 63   Temp 97.3  F (36.3  C) (Oral)   Resp 18   Ht 1.702 m (5' 7\")   Wt 102.8 kg (226 lb 9.6 oz)   LMP 10/19/2012   SpO2 99%   BMI 35.49 kg/m   Estimated body mass index is 35.49 kg/m  as calculated from the following:    Height as of this encounter: 1.702 m (5' 7\").    Weight as of this encounter: 102.8 kg (226 lb 9.6 oz).  Medication Reconciliation: complete  Yasmany Munroe CMA    "

## 2020-07-23 NOTE — NURSING NOTE
Prior to injection verified patient identity using patient's name and date of birth.    Patient instructed to wait in clinic for 20 minutes following injection and to notify staff of any adverse reactions immediately.     Screening Questionnaire for Adult Immunization     Are you sick today?   No   Do you have allergies to medications, food or any vaccine?   No   Have you ever had a serious reaction after receiving a vaccination?   No   Do you have a long-term health problem with heart disease, lung disease,  asthma, kidney disease, diabetes, anemia, metabolic or blood disease?   No   Do you have cancer, leukemia, AIDS, or any immune system problem?   No   Do you take cortisone, prednisone, other steroids, or anticancer drugs, or  have you had any x-ray (radiation) treatments?   No   Have you had a seizure, brain, or other nervous system problem?   No   During the past year, have you received a transfusion of blood or blood       products, or been given a medicine called immune (gamma) globulin?   No   For women: Are you pregnant or is there a chance you could become         pregnant during the next month?   No   Have you received any vaccinations in the past 4 weeks?   No    Immunization questionnaire answers were all negative.      MNVFC doesn't apply on this patient  Yasmany Munroe, [facility]

## 2020-11-16 ENCOUNTER — HEALTH MAINTENANCE LETTER (OUTPATIENT)
Age: 60
End: 2020-11-16

## 2021-01-19 ENCOUNTER — OFFICE VISIT (OUTPATIENT)
Dept: OPTOMETRY | Facility: CLINIC | Age: 61
End: 2021-01-19
Payer: COMMERCIAL

## 2021-01-19 DIAGNOSIS — H52.13 MYOPIA OF BOTH EYES: ICD-10-CM

## 2021-01-19 DIAGNOSIS — H52.4 PRESBYOPIA: Primary | ICD-10-CM

## 2021-01-19 DIAGNOSIS — H40.053 BILATERAL OCULAR HYPERTENSION: ICD-10-CM

## 2021-01-19 PROCEDURE — 92015 DETERMINE REFRACTIVE STATE: CPT | Performed by: OPTOMETRIST

## 2021-01-19 PROCEDURE — 92004 COMPRE OPH EXAM NEW PT 1/>: CPT | Performed by: OPTOMETRIST

## 2021-01-19 ASSESSMENT — CONF VISUAL FIELD
OD_NORMAL: 1
METHOD: COUNTING FINGERS
OS_NORMAL: 1

## 2021-01-19 ASSESSMENT — VISUAL ACUITY
OS_SC: 20/25
OS_SC+: -1
OS_SC: 20/40
OD_SC: 20/25
METHOD: SNELLEN - LINEAR
OD_SC: 20/70-1
OD_SC+: -2

## 2021-01-19 ASSESSMENT — REFRACTION_MANIFEST
OS_CYLINDER: +1.00
OS_SPHERE: -0.75
OD_SPHERE: -0.25
OS_AXIS: 060
OD_ADD: +2.00
OD_SPHERE: +0.25
METHOD_AUTOREFRACTION: 1
OS_ADD: +2.00
OS_SPHERE: -1.75
OS_AXIS: 072
OS_CYLINDER: +1.50
OD_CYLINDER: SPHERE

## 2021-01-19 ASSESSMENT — REFRACTION_WEARINGRX
OS_SPHERE: -1.00
OD_ADD: +1.00
OS_ADD: +1.00
OS_CYLINDER: SPHERE
OD_CYLINDER: SPHERE
OD_SPHERE: -0.50

## 2021-01-19 ASSESSMENT — TONOMETRY
OS_IOP_MMHG: 23
IOP_METHOD: APPLANATION
OD_IOP_MMHG: 23

## 2021-01-19 ASSESSMENT — SLIT LAMP EXAM - LIDS
COMMENTS: NORMAL
COMMENTS: NORMAL

## 2021-01-19 ASSESSMENT — PACHYMETRY
OD_CT(UM): 566
OS_CT(UM): 562

## 2021-01-19 ASSESSMENT — KERATOMETRY
OS_K2POWER_DIOPTERS: 45.50
OD_K1POWER_DIOPTERS: 44.75
OS_K1POWER_DIOPTERS: 44.00
OD_K2POWER_DIOPTERS: 45.25
OS_AXISANGLE2_DEGREES: 168
OD_AXISANGLE2_DEGREES: 173

## 2021-01-19 ASSESSMENT — CUP TO DISC RATIO
OD_RATIO: 0.5
OS_RATIO: 0.5

## 2021-01-19 NOTE — PATIENT INSTRUCTIONS
Consider readers as needed   Due to elevated intraocular pressure and average thickness cornea , with family history of glaucoma (mother)- need for further testing.  Refer for ocular hypertension evaluation at [facility] at [place]   Return in 1 year for eye exam    Selena Yousif, OD  [phone]

## 2021-01-19 NOTE — PROGRESS NOTES
Chief Complaint   Patient presents with     Annual Eye Exam         Last Eye Exam: 5/29/2013, may have gone someplace else but she's not sure   Dilated Previously: Yes    What are you currently using to see?  does not use glasses or contacts       Distance Vision Acuity: Satisfied with vision for the most part, doesn't like to drive at night, not a change though     Near Vision Acuity: Satisfied with vision while reading and using computer unaided. Has to have good lighting to see pill bottles     Eye Comfort: good  Do you use eye drops? 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       OBJECTIVE: See Ophthalmology exam    ASSESSMENT:    ICD-10-CM    1. Presbyopia  H52.4 EYE EXAM (SIMPLE-NONBILLABLE)     REFRACTION     PACHYMETRY   2. Myopia of both eyes  H52.13 EYE EXAM (SIMPLE-NONBILLABLE)     REFRACTION     PACHYMETRY   3. Bilateral ocular hypertension  H40.053 EYE EXAM (SIMPLE-NONBILLABLE)     REFRACTION     EYE ADULT REFERRAL     PACHYMETRY      PLAN:     Patient Instructions   Consider readers as needed   Refer for ocular hypertension  Evaluation at [facility] at [place]   Return in 1 year for eye exam    Selena Yousif, OD  [phone]

## 2021-01-19 NOTE — LETTER
1/19/2021         RE: Missy Kirkpatrick  [address] [facility] [address]        Dear Colleague,    Thank you for referring your patient, Missy Kirkpatrick, to the [facility]. Please see a copy of my visit note below.    Chief Complaint   Patient presents with     Annual Eye Exam         Last Eye Exam: 5/29/2013, may have gone someplace else but she's not sure   Dilated Previously: Yes    What are you currently using to see?  does not use glasses or contacts       Distance Vision Acuity: Satisfied with vision for the most part, doesn't like to drive at night, not a change though     Near Vision Acuity: Satisfied with vision while reading and using computer unaided. Has to have good lighting to see pill bottles     Eye Comfort: good  Do you use eye drops? 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       OBJECTIVE: See Ophthalmology exam    ASSESSMENT:    ICD-10-CM    1. Presbyopia  H52.4 EYE EXAM (SIMPLE-NONBILLABLE)     REFRACTION     PACHYMETRY   2. Myopia of both eyes  H52.13 EYE EXAM (SIMPLE-NONBILLABLE)     REFRACTION     PACHYMETRY   3. Bilateral ocular hypertension  H40.053 EYE EXAM (SIMPLE-NONBILLABLE)     REFRACTION     EYE ADULT REFERRAL     PACHYMETRY      PLAN:     Patient Instructions   Consider readers as needed   Refer for ocular hypertension  Evaluation at [facility] at [place]   Return in 1 year for eye exam    Selena Yousif, OD  [phone]                       Again, thank you for allowing me to participate in the care of your patient.        Sincerely,        eSlena Yousif, OD

## 2021-03-19 ENCOUNTER — OFFICE VISIT (OUTPATIENT)
Dept: OPHTHALMOLOGY | Facility: CLINIC | Age: 61
End: 2021-03-19
Payer: COMMERCIAL

## 2021-03-19 DIAGNOSIS — H40.003 GLAUCOMA SUSPECT OF BOTH EYES: Primary | ICD-10-CM

## 2021-03-19 PROCEDURE — 92002 INTRM OPH EXAM NEW PATIENT: CPT | Performed by: STUDENT IN AN ORGANIZED HEALTH CARE EDUCATION/TRAINING PROGRAM

## 2021-03-19 PROCEDURE — 92083 EXTENDED VISUAL FIELD XM: CPT | Performed by: STUDENT IN AN ORGANIZED HEALTH CARE EDUCATION/TRAINING PROGRAM

## 2021-03-19 PROCEDURE — 92133 CPTRZD OPH DX IMG PST SGM ON: CPT | Performed by: STUDENT IN AN ORGANIZED HEALTH CARE EDUCATION/TRAINING PROGRAM

## 2021-03-19 RX ORDER — LATANOPROST 50 UG/ML
1 SOLUTION/ DROPS OPHTHALMIC AT BEDTIME
Qty: 2.5 ML | Refills: 11 | Status: SHIPPED | OUTPATIENT
Start: 2021-03-19 | End: 2022-03-25

## 2021-03-19 ASSESSMENT — TONOMETRY
IOP_METHOD: TONOPEN
OD_IOP_MMHG: 20
OS_IOP_MMHG: 16

## 2021-03-19 ASSESSMENT — CUP TO DISC RATIO
OD_RATIO: 0.5
OS_RATIO: 0.5

## 2021-03-19 ASSESSMENT — SLIT LAMP EXAM - LIDS
COMMENTS: NORMAL
COMMENTS: NORMAL

## 2021-03-19 ASSESSMENT — VISUAL ACUITY
OS_SC: 20/30
OS_PH_SC: 20/20-1
METHOD: SNELLEN - LINEAR
OD_PH_SC: 20/20
OD_SC: 20/30+3

## 2021-03-19 NOTE — PATIENT INSTRUCTIONS
Start Latanoprost (green top) at bedtime in both eyes     Return for eye pressure check in 3 weeks. If you forget to take your drop the night before your appointment, please reschedule.    Araceli Martinez MD  [phone]

## 2021-03-19 NOTE — LETTER
3/19/2021         RE: Missy Kirkpatrick  [address]  [place] [address]        Dear Colleague,    Thank you for referring your patient, Missy Kirkpatrick, to the [facility]. Please see a copy of my visit note below.     Current Eye Medications:  no     Subjective:  Glaucoma evaluation/Dr. Yousif referral  Pt reports that she sees well, notices not seeing as well at night.     Objective:  See Ophthalmology Exam.       Assessment:  Missy Kirkpatrick is a 60 year old female who presents with:   Encounter Diagnosis   Name Primary?     Glaucoma suspect of both eyes IOP 20/16 with slightly thick central corneal thickness (566/562). Thinning on OCT, so recommend starting latanoprost both eyes.     OCT optic nerve: avg retinal nerve fiber layer 57/62; thin S,T,I both eyes.     Sampson visual field (HVF) 24-2: within normal limits (mild scattered loss both eyes).        Plan:  Start Latanoprost (green top) at bedtime in both eyes     Return for eye pressure check in 3 weeks. If you forget to take your drop the night before your appointment, please reschedule.    Araceli Martinez MD  [phone]          Again, thank you for allowing me to participate in the care of your patient.        Sincerely,        Araceli Martinez MD

## 2021-03-19 NOTE — PROGRESS NOTES
Current Eye Medications:  no     Subjective:  Glaucoma evaluation/Dr. Yousif referral  Pt reports that she sees well, notices not seeing as well at night.     Objective:  See Ophthalmology Exam.       Assessment:  Missy Kirkpatrick is a 60 year old female who presents with:   Encounter Diagnosis   Name Primary?     Glaucoma suspect of both eyes IOP 20/16 with slightly thick central corneal thickness (566/562). Thinning on OCT, so recommend starting latanoprost both eyes.     OCT optic nerve: avg retinal nerve fiber layer 57/62; thin S,T,I both eyes.     Sampson visual field (HVF) 24-2: within normal limits (mild scattered loss both eyes).        Plan:  Start Latanoprost (green top) at bedtime in both eyes     Return for eye pressure check in 3 weeks. If you forget to take your drop the night before your appointment, please reschedule.    Araceli Martinez MD  [phone]

## 2021-03-22 DIAGNOSIS — Z12.31 VISIT FOR SCREENING MAMMOGRAM: ICD-10-CM

## 2021-03-22 PROCEDURE — 77067 SCR MAMMO BI INCL CAD: CPT | Mod: TC | Performed by: RADIOLOGY

## 2021-03-22 PROCEDURE — 77063 BREAST TOMOSYNTHESIS BI: CPT | Mod: TC | Performed by: RADIOLOGY

## 2021-04-05 ENCOUNTER — TELEPHONE (OUTPATIENT)
Dept: FAMILY MEDICINE | Facility: CLINIC | Age: 61
End: 2021-04-05

## 2021-04-05 ENCOUNTER — OFFICE VISIT (OUTPATIENT)
Dept: OPHTHALMOLOGY | Facility: CLINIC | Age: 61
End: 2021-04-05
Payer: COMMERCIAL

## 2021-04-05 DIAGNOSIS — H40.003 GLAUCOMA SUSPECT OF BOTH EYES: Primary | ICD-10-CM

## 2021-04-05 PROCEDURE — 92012 INTRM OPH EXAM EST PATIENT: CPT | Performed by: STUDENT IN AN ORGANIZED HEALTH CARE EDUCATION/TRAINING PROGRAM

## 2021-04-05 ASSESSMENT — VISUAL ACUITY
METHOD: SNELLEN - LINEAR
OD_SC: 20/25
OD_SC+: -3
OS_SC: 20/25
OS_SC+: -1

## 2021-04-05 ASSESSMENT — TONOMETRY
OS_IOP_MMHG: 14
IOP_METHOD: APPLANATION
OD_IOP_MMHG: 15

## 2021-04-05 ASSESSMENT — CUP TO DISC RATIO
OD_RATIO: 0.5
OS_RATIO: 0.5

## 2021-04-05 ASSESSMENT — SLIT LAMP EXAM - LIDS
COMMENTS: NORMAL
COMMENTS: NORMAL

## 2021-04-05 NOTE — TELEPHONE ENCOUNTER
Patient Quality Outreach      Summary:    Patient has the following on her problem list/HM: None    Patient is due/failing the following:   Colonoscopy    Type of outreach:    Sent Infectioust message.    Questions for provider review:    None                                                                                                                                     Yasmany Munroe CMA       Chart routed to closed.

## 2021-04-05 NOTE — LETTER
4/5/2021         RE: Missy Kirkpatrick  [address]  [place] [address]        Dear Colleague,    Thank you for referring your patient, Missy Kirkpatrick, to the [facility]. Please see a copy of my visit note below.     Current Eye Medications:  Latanoprost at bedtime in both eyes      Subjective:  Here for IOP check. At first noticed her close up vision has been blurred, but now back to normal. No pain no redness.       Objective:  See Ophthalmology Exam.       Assessment:  Missy Kirkpatrick is a 60 year old female who presents with:   Encounter Diagnosis   Name Primary?     Glaucoma suspect of both eyes Nice response with latanoprost. Intraocular pressure 15/14 today (from 20/16). Continue same medication.       Plan:  Continue Latanoprost (green top) at bedtime both eyes     Araceli Martinez MD  [phone]          Again, thank you for allowing me to participate in the care of your patient.        Sincerely,        Araceli Martinez MD

## 2021-04-05 NOTE — PROGRESS NOTES
Current Eye Medications:  Latanoprost at bedtime in both eyes      Subjective:  Here for IOP check. At first noticed her close up vision has been blurred, but now back to normal. No pain no redness.       Objective:  See Ophthalmology Exam.       Assessment:  Missy Kirkpatrick is a 60 year old female who presents with:   Encounter Diagnosis   Name Primary?     Glaucoma suspect of both eyes Nice response with latanoprost. Intraocular pressure 15/14 today (from 20/16). Continue same medication.       Plan:  Continue Latanoprost (green top) at bedtime both eyes     Araceli Martinez MD  [phone]

## 2021-09-18 ENCOUNTER — HEALTH MAINTENANCE LETTER (OUTPATIENT)
Age: 61
End: 2021-09-18

## 2021-10-04 ENCOUNTER — OFFICE VISIT (OUTPATIENT)
Dept: OPHTHALMOLOGY | Facility: CLINIC | Age: 61
End: 2021-10-04
Payer: COMMERCIAL

## 2021-10-04 DIAGNOSIS — H40.003 GLAUCOMA SUSPECT OF BOTH EYES: Primary | ICD-10-CM

## 2021-10-04 PROCEDURE — 92012 INTRM OPH EXAM EST PATIENT: CPT | Performed by: STUDENT IN AN ORGANIZED HEALTH CARE EDUCATION/TRAINING PROGRAM

## 2021-10-04 ASSESSMENT — CUP TO DISC RATIO
OS_RATIO: 0.5
OD_RATIO: 0.5

## 2021-10-04 ASSESSMENT — VISUAL ACUITY
OD_SC: 20/25
OD_SC+: -2
OS_SC+: -2
METHOD: SNELLEN - LINEAR
OS_SC: 20/25

## 2021-10-04 ASSESSMENT — SLIT LAMP EXAM - LIDS
COMMENTS: NORMAL
COMMENTS: NORMAL

## 2021-10-04 ASSESSMENT — TONOMETRY
IOP_METHOD: APPLANATION
OS_IOP_MMHG: 16
OD_IOP_MMHG: 16

## 2021-10-04 NOTE — LETTER
10/4/2021         RE: Missy Kirkpatrick  [address] [facility] [address]        Dear Colleague,    Thank you for referring your patient, Missy Kirkpatrick, to the [facility]. Please see a copy of my visit note below.     Current Eye Medications:  Latanoprost (green top) at bedtime both eyes. Last drop: 10:30 last night.     Subjective:  Here for IOP check. No vision changes. Pt is using drops as directed.      Objective:  See Ophthalmology Exam.      Assessment:  Missy Kirkpatrick is a 61 year old female who presents with:   Encounter Diagnosis   Name Primary?     Glaucoma suspect of both eyes Intraocular pressure 16/16 today.        Plan:  Continue Latanoprost (green top) at bedtime both eyes     Araceli Martinez MD  [phone]               Again, thank you for allowing me to participate in the care of your patient.        Sincerely,        Araceli Martinez MD

## 2021-10-04 NOTE — PROGRESS NOTES
Current Eye Medications:  Latanoprost (green top) at bedtime both eyes. Last drop: 10:30 last night.     Subjective:  Here for IOP check. No vision changes. Pt is using drops as directed.      Objective:  See Ophthalmology Exam.      Assessment:  Missy Kirkpatrick is a 61 year old female who presents with:   Encounter Diagnosis   Name Primary?     Glaucoma suspect of both eyes Intraocular pressure 16/16 today.        Plan:  Continue Latanoprost (green top) at bedtime both eyes     Araceli Martinez MD  [phone]

## 2021-12-20 NOTE — PROGRESS NOTES
SUBJECTIVE:   CC: Missy Kirkpatrick is an 61 year old woman who presents for preventive health visit.       Patient has been advised of split billing requirements and indicates understanding: Yes  Healthy Habits:     Getting at least 3 servings of Calcium per day:  Yes    Bi-annual eye exam:  Yes    Dental care twice a year:  NO    Sleep apnea or symptoms of sleep apnea:  None    Diet:  Regular (no restrictions)    Frequency of exercise:  2-3 days/week    Duration of exercise:  30-45 minutes    Taking medications regularly:  Yes    Medication side effects:  None    PHQ-2 Total Score: 0    Additional concerns today:  Yes              Today's PHQ-2 Score:   PHQ-2 ( 1999 [employer]) 1/10/2022   Q1: Little interest or pleasure in doing things 0   Q2: Feeling down, depressed or hopeless 0   PHQ-2 Score 0   PHQ-2 Total Score (12-17 Years)- Positive if 3 or more points; Administer PHQ-A if positive -   Q1: Little interest or pleasure in doing things Not at all   Q2: Feeling down, depressed or hopeless Not at all   PHQ-2 Score 0       Abuse: Current or Past (Physical, Sexual or Emotional) - No  Do you feel safe in your environment? YES        Social History     Tobacco Use     Smoking status: Never Smoker     Smokeless tobacco: Never Used     Tobacco comment: Nonsmoking household   Substance Use Topics     Alcohol use: Yes     Comment: Occasionally     If you drink alcohol do you typically have >3 drinks per day or >7 drinks per week? No    Alcohol Use 1/10/2022   Prescreen: >3 drinks/day or >7 drinks/week? No   Prescreen: >3 drinks/day or >7 drinks/week? -       Reviewed orders with patient.  Reviewed health maintenance and updated orders accordingly - Yes    G 3 P 3   Patient's last menstrual period was 10/19/2012.     Fasting: No   Td: tdap 7/2020       Flu: 9/25/2021      Covid: Mod 4/17/2021       Shingrix: done      PPV: NA      Status post benign hysterectomy. Health Maintenance and Surgical History  updated.               Cholesterol:   Lab Results   Component Value Date    CHOL 212 03/10/2020     Lab Results   Component Value Date    HDL 68 03/10/2020     Lab Results   Component Value Date     03/10/2020     Lab Results   Component Value Date    TRIG 74 03/10/2020     Lab Results   Component Value Date    CHOLHDLRATIO 3.2 10/21/2015         MMG: 3/2021  Dexa:  NA     Flex/colo: 12/2020, due      Seat Belt: Yes    Sunscreen use: Yes   Calcium Intake: adeq  Health Care Directive: No  Sexually Active: Yes     Current contraception: hysterectomy  History of abnormal Pap smear: No  Family history of colon/breast/ovarian cancer: No  Regular self breast exam: Yes  History of abnormal mammogram: No      Labs reviewed in EPIC  BP Readings from Last 3 Encounters:   01/10/22 130/82   07/23/20 122/82   09/23/19 123/79    Wt Readings from Last 3 Encounters:   01/10/22 106.1 kg (234 lb)   07/23/20 102.8 kg (226 lb 9.6 oz)   12/26/18 101.6 kg (224 lb)                  Patient Active Problem List   Diagnosis     CARDIOVASCULAR SCREENING; LDL GOAL LESS THAN 160     Female stress incontinence     Factor 5 Leiden mutation, heterozygous (H)     Multiple thyroid nodules     S/P partial thyroidectomy     Advanced directives, counseling/discussion     Right rotator cuff tendonitis     Complete tear of right rotator cuff     Past Surgical History:   Procedure Laterality Date     ABDOMEN SURGERY  December 2012    Hysterectomy     APPENDECTOMY       BIOPSY  November 2012    utererus     COLONOSCOPY  October 2012     ENT SURGERY  February 1994    Thyroid issue     ORTHOPEDIC SURGERY  01/1976    [place]     TUBAL LIGATION       [facility] ANESTH,CLEFT PALATE REPAIR       [facility] ANESTH,ELBOW,NOS  01/76     [facility] REMOVE BENIGN THYROID LESION  01/2002    nodule removed      [facility] TOTAL ABDOM HYSTERECTOMY  12/14/2012    pathology benign, no further paps needed       Social History     Tobacco Use     Smoking status: Never Smoker     Smokeless  tobacco: Never Used     Tobacco comment: Nonsmoking household   Substance Use Topics     Alcohol use: Yes     Comment: Occasionally     Family History   Problem Relation Age of Onset     Cerebrovascular Disease Mother      Eye Disorder Mother         glaucoma     Heart Disease Mother      Diabetes Mother         blindness     Hypertension Mother      Eye Surgery Mother         cataracts     Cataracts Mother      Blood Disease Son 27        blood clots     Osteoporosis Son         July 2020     Other Cancer Brother         Lung Cancer     Diabetes Brother      Lung Cancer Brother      Blood Disease Brother      Obesity Brother         Bariatric surgery     Blood Disease Brother      Diabetes Brother      Blood Disease Brother      Colon Cancer Brother      Hypertension Brother      Other Cancer Brother         Pancreatic Cancer     Pancreatic Cancer Brother      Blood Disease Brother      Melanoma No family hx of      Glaucoma No family hx of      Macular Degeneration No family hx of          Current Outpatient Medications   Medication Sig Dispense Refill     latanoprost (XALATAN) 0.005 % ophthalmic solution Place 1 drop into both eyes At Bedtime 2.5 mL 11     MULTIVITAMIN TABS   OR 1 TABLET DAILY       tretinoin (RETIN-A) 0.05 % external cream Spread a pea size amount into affected area topically at bedtime.  Use sunscreen SPF>20. 45 g 6       Breast Cancer Screening:  Any new diagnosis of family breast, ovarian, or bowel cancer? Yes       FHS-7:   Breast CA Risk Assessment (FHS-7) 1/10/2022   Did any of your first-degree relatives have breast or ovarian cancer? No   Did any of your relatives have bilateral breast cancer? No       Mammogram Screening: Recommended mammography every 1-2 years with patient discussion and risk factor consideration  Pertinent mammograms are reviewed under the imaging tab.      Reviewed and updated as needed this visit by clinical staff  Tobacco  Allergies  Meds  Problems  Med Hx   "Surg Hx  Fam Hx  Soc Hx         Reviewed and updated as needed this visit by Provider  Tobacco  Allergies  Meds  Problems  Med Hx  Surg Hx  Fam Hx            Review of Systems   Constitutional: Negative for chills and fever.   HENT: Negative for congestion, ear pain, hearing loss and sore throat.    Eyes: Negative for pain and visual disturbance.   Respiratory: Negative for cough and shortness of breath.    Cardiovascular: Negative for chest pain, palpitations and peripheral edema.   Gastrointestinal: Negative for abdominal pain, constipation, diarrhea, heartburn, hematochezia and nausea.   Breasts:  Negative for tenderness, breast mass and discharge.   Genitourinary: Positive for frequency and urgency. Negative for dysuria, genital sores, hematuria, pelvic pain, vaginal bleeding and vaginal discharge.   Musculoskeletal: Positive for arthralgias. Negative for myalgias.   Skin: Negative for rash.   Neurological: Negative for dizziness, weakness, headaches and paresthesias.   Psychiatric/Behavioral: Negative for mood changes. The patient is not nervous/anxious.           OBJECTIVE:   /82   Pulse 70   Temp 97.7  F (36.5  C) (Oral)   Resp 16   Ht 1.727 m (5' 8\")   Wt 106.1 kg (234 lb)   LMP 10/19/2012   BMI 35.58 kg/m    Physical Exam  GENERAL APPEARANCE: healthy, alert and no distress  EYES: Eyes grossly normal to inspection, PERRL and conjunctivae and sclerae normal  HENT: ear canals and TM's normal, nose and mouth without ulcers or lesions, oropharynx clear and oral mucous membranes moist  NECK: no adenopathy, no asymmetry, masses, or scars and thyroid normal to palpation  RESP: lungs clear to auscultation - no rales, rhonchi or wheezes  BREAST: normal without masses, tenderness or nipple discharge and no palpable axillary masses or adenopathy  CV: regular rate and rhythm, normal S1 S2, no S3 or S4, no murmur, click or rub, no peripheral edema and peripheral pulses strong  ABDOMEN: soft, " "nontender, no hepatosplenomegaly, no masses and bowel sounds normal  MS: no musculoskeletal defects are noted and gait is age appropriate without ataxia  SKIN: no suspicious lesions or rashes  NEURO: Normal strength and tone, sensory exam grossly normal, mentation intact and speech normal  PSYCH: mentation appears normal and affect normal/bright    Diagnostic Test Results:  Labs reviewed in Epic    ASSESSMENT/PLAN:   (Z00.00) Routine general medical examination at a health care facility  (primary encounter diagnosis)  Comment: preventive needs reviewed   Plan: GLUCOSE        see orders in Epic.     (D68.51) Factor 5 Leiden mutation, heterozygous (H)  Comment: no clotting issues  Plan: not on anticoagulation    (E04.2) Multiple thyroid nodules  Comment: recheck due  Plan: TSH WITH FREE T4 REFLEX        Reports no symptoms    (Z12.11) Special screening for malignant neoplasms, colon  Comment: due  Plan: Adult Gastro Ref - Procedure Only            (Z13.6) CARDIOVASCULAR SCREENING; LDL GOAL LESS THAN 160  Comment: due  Plan: Lipid panel reflex to direct LDL Non-fasting            (Z23) Need for vaccination  Comment: due  Plan: TDAP VACCINE (Adacel, Boostrix)  [[id number]]              Patient has been advised of split billing requirements and indicates understanding: Yes  COUNSELING:  Reviewed preventive health counseling, as reflected in patient instructions  Special attention given to:        Regular exercise       Healthy diet/nutrition    Estimated body mass index is 35.58 kg/m  as calculated from the following:    Height as of this encounter: 1.727 m (5' 8\").    Weight as of this encounter: 106.1 kg (234 lb).    Weight management plan: Discussed healthy diet and exercise guidelines    She reports that she has never smoked. She has never used smokeless tobacco.      Counseling Resources:  ATP IV Guidelines  Pooled Cohorts Equation Calculator  Breast Cancer Risk Calculator  BRCA-Related Cancer Risk Assessment: FHS-7 " Tool  FRAX Risk Assessment  ICSI Preventive Guidelines  Dietary Guidelines for Americans, 2010  USDA's MyPlate  ASA Prophylaxis  Lung CA Screening    Shell Osuna MD  [facility]

## 2022-01-10 ENCOUNTER — OFFICE VISIT (OUTPATIENT)
Dept: FAMILY MEDICINE | Facility: CLINIC | Age: 62
End: 2022-01-10
Payer: COMMERCIAL

## 2022-01-10 VITALS
HEART RATE: 70 BPM | SYSTOLIC BLOOD PRESSURE: 130 MMHG | RESPIRATION RATE: 16 BRPM | HEIGHT: 68 IN | DIASTOLIC BLOOD PRESSURE: 82 MMHG | TEMPERATURE: 97.7 F | BODY MASS INDEX: 35.46 KG/M2 | WEIGHT: 234 LBS

## 2022-01-10 DIAGNOSIS — Z12.11 SPECIAL SCREENING FOR MALIGNANT NEOPLASMS, COLON: ICD-10-CM

## 2022-01-10 DIAGNOSIS — Z23 NEED FOR VACCINATION: ICD-10-CM

## 2022-01-10 DIAGNOSIS — E04.2 MULTIPLE THYROID NODULES: ICD-10-CM

## 2022-01-10 DIAGNOSIS — D68.51 FACTOR 5 LEIDEN MUTATION, HETEROZYGOUS (H): ICD-10-CM

## 2022-01-10 DIAGNOSIS — Z13.6 CARDIOVASCULAR SCREENING; LDL GOAL LESS THAN 160: ICD-10-CM

## 2022-01-10 DIAGNOSIS — Z00.00 ROUTINE GENERAL MEDICAL EXAMINATION AT A HEALTH CARE FACILITY: Primary | ICD-10-CM

## 2022-01-10 LAB
CHOLEST SERPL-MCNC: 224 MG/DL
FASTING STATUS PATIENT QL REPORTED: NO
FASTING STATUS PATIENT QL REPORTED: NO
GLUCOSE BLD-MCNC: 101 MG/DL (ref 70–99)
HDLC SERPL-MCNC: 56 MG/DL
LDLC SERPL CALC-MCNC: 126 MG/DL
NONHDLC SERPL-MCNC: 168 MG/DL
TRIGL SERPL-MCNC: 211 MG/DL
TSH SERPL DL<=0.005 MIU/L-ACNC: 0.6 MU/L (ref 0.4–4)

## 2022-01-10 PROCEDURE — 90715 TDAP VACCINE 7 YRS/> IM: CPT | Performed by: FAMILY MEDICINE

## 2022-01-10 PROCEDURE — 80061 LIPID PANEL: CPT | Performed by: FAMILY MEDICINE

## 2022-01-10 PROCEDURE — 36415 COLL VENOUS BLD VENIPUNCTURE: CPT | Performed by: FAMILY MEDICINE

## 2022-01-10 PROCEDURE — 99213 OFFICE O/P EST LOW 20 MIN: CPT | Mod: 25 | Performed by: FAMILY MEDICINE

## 2022-01-10 PROCEDURE — 84443 ASSAY THYROID STIM HORMONE: CPT | Performed by: FAMILY MEDICINE

## 2022-01-10 PROCEDURE — 82947 ASSAY GLUCOSE BLOOD QUANT: CPT | Performed by: FAMILY MEDICINE

## 2022-01-10 PROCEDURE — 90471 IMMUNIZATION ADMIN: CPT | Performed by: FAMILY MEDICINE

## 2022-01-10 PROCEDURE — 99396 PREV VISIT EST AGE 40-64: CPT | Mod: 25 | Performed by: FAMILY MEDICINE

## 2022-01-10 ASSESSMENT — ENCOUNTER SYMPTOMS
CHILLS: 0
HEARTBURN: 0
ARTHRALGIAS: 1
BREAST MASS: 0
PALPITATIONS: 0
CONSTIPATION: 0
EYE PAIN: 0
NAUSEA: 0
PARESTHESIAS: 0
HEADACHES: 0
FEVER: 0
ABDOMINAL PAIN: 0
SORE THROAT: 0
HEMATURIA: 0
DYSURIA: 0
MYALGIAS: 0
WEAKNESS: 0
COUGH: 0
NERVOUS/ANXIOUS: 0
DIZZINESS: 0
SHORTNESS OF BREATH: 0
DIARRHEA: 0
FREQUENCY: 1
HEMATOCHEZIA: 0

## 2022-01-10 ASSESSMENT — MIFFLIN-ST. JEOR: SCORE: 1669.92

## 2022-01-10 ASSESSMENT — PAIN SCALES - GENERAL: PAINLEVEL: NO PAIN (0)

## 2022-01-10 NOTE — NURSING NOTE
Prior to immunization administration, verified patients identity using patient s name and date of birth. Please see Immunization Activity for additional information.     Screening Questionnaire for Adult Immunization    Are you sick today?   No   Do you have allergies to medications, food, a vaccine component or latex?   No   Have you ever had a serious reaction after receiving a vaccination?   No   Do you have a long-term health problem with heart, lung, kidney, or metabolic disease (e.g., diabetes), asthma, a blood disorder, no spleen, complement component deficiency, a cochlear implant, or a spinal fluid leak?  Are you on long-term aspirin therapy?   No   Do you have cancer, leukemia, HIV/AIDS, or any other immune system problem?   No   Do you have a parent, brother, or sister with an immune system problem?   No   In the past 3 months, have you taken medications that affect  your immune system, such as prednisone, other steroids, or anticancer drugs; drugs for the treatment of rheumatoid arthritis, Crohn s disease, or psoriasis; or have you had radiation treatments?   No   Have you had a seizure, or a brain or other nervous system problem?   No   During the past year, have you received a transfusion of blood or blood    products, or been given immune (gamma) globulin or antiviral drug?   No   For women: Are you pregnant or is there a chance you could become       pregnant during the next month?   No   Have you received any vaccinations in the past 4 weeks?   No     Immunization questionnaire answers were all negative.        Per orders of Dr. Osuna, injection of Tdap given by Yasmany Fleming CMA. Patient instructed to remain in clinic for 15 minutes afterwards, and to report any adverse reaction to me immediately.       Screening performed by Yasmany Fleming CMA on 1/10/2022 at 4:27 PM.

## 2022-01-20 ENCOUNTER — MYC MEDICAL ADVICE (OUTPATIENT)
Dept: FAMILY MEDICINE | Facility: CLINIC | Age: 62
End: 2022-01-20
Payer: COMMERCIAL

## 2022-03-25 DIAGNOSIS — H40.003 GLAUCOMA SUSPECT OF BOTH EYES: ICD-10-CM

## 2022-03-25 RX ORDER — LATANOPROST 50 UG/ML
1 SOLUTION/ DROPS OPHTHALMIC AT BEDTIME
Qty: 2.5 ML | Refills: 3 | Status: SHIPPED | OUTPATIENT
Start: 2022-03-25 | End: 2022-04-06

## 2022-04-06 ENCOUNTER — OFFICE VISIT (OUTPATIENT)
Dept: OPHTHALMOLOGY | Facility: CLINIC | Age: 62
End: 2022-04-06
Payer: COMMERCIAL

## 2022-04-06 DIAGNOSIS — H40.003 GLAUCOMA SUSPECT OF BOTH EYES: Primary | ICD-10-CM

## 2022-04-06 PROCEDURE — 92133 CPTRZD OPH DX IMG PST SGM ON: CPT | Performed by: STUDENT IN AN ORGANIZED HEALTH CARE EDUCATION/TRAINING PROGRAM

## 2022-04-06 PROCEDURE — 92083 EXTENDED VISUAL FIELD XM: CPT | Performed by: STUDENT IN AN ORGANIZED HEALTH CARE EDUCATION/TRAINING PROGRAM

## 2022-04-06 PROCEDURE — 92012 INTRM OPH EXAM EST PATIENT: CPT | Performed by: STUDENT IN AN ORGANIZED HEALTH CARE EDUCATION/TRAINING PROGRAM

## 2022-04-06 RX ORDER — LATANOPROST 50 UG/ML
1 SOLUTION/ DROPS OPHTHALMIC AT BEDTIME
Qty: 7.5 ML | Refills: 3 | Status: SHIPPED | OUTPATIENT
Start: 2022-04-06 | End: 2022-10-05

## 2022-04-06 ASSESSMENT — CUP TO DISC RATIO
OS_RATIO: 0.5
OD_RATIO: 0.5

## 2022-04-06 ASSESSMENT — TONOMETRY
OD_IOP_MMHG: 17
IOP_METHOD: APPLANATION
OS_IOP_MMHG: 17

## 2022-04-06 ASSESSMENT — PACHYMETRY
OS_CT(UM): 562
OD_CT(UM): 566

## 2022-04-06 ASSESSMENT — VISUAL ACUITY
OS_CC+: -3
METHOD: SNELLEN - LINEAR
OD_CC: 20/25
OD_CC+: -3
OS_CC: 20/25

## 2022-04-06 ASSESSMENT — SLIT LAMP EXAM - LIDS
COMMENTS: NORMAL
COMMENTS: NORMAL

## 2022-04-06 NOTE — PROGRESS NOTES
Current Eye Medications:  Latanoprost at bedtime both eyes, last took at 11 PM.     Subjective:  6 month follow up for IOP/HVF/OCT. Vision is OK both eyes distance. Regular reading up close is fine, some trouble with pill bottles. Sometimes when playing pickle ball has hard time seeing green balls, orange are fine. No eye pain or discomfort in either eye.      Objective:  See Ophthalmology Exam.      Assessment:  Missy Kirkpatrick is a 61 year old female who presents with:   Encounter Diagnosis   Name Primary?     Glaucoma suspect of both eyes Intraocular pressure 17/17 today on latanoprost. OCT and visual field stable both eyes. Continue same medication.     OCT optic nerve: avg retinal nerve fiber layer 53/62; thin S, T,I both eyes - stable both eyes.     Sampson visual field (HVF) 24-2: scattered loss right eye; early inf arcuate defect left eye.        Plan:  Continue Latanoprost (green top) at bedtime both eyes     Araceli Martinez MD  [phone]

## 2022-04-06 NOTE — LETTER
4/6/2022         RE: Missy Kirkpatrick  [address]  [place] [address]        Dear Colleague,    Thank you for referring your patient, Missy Kirkpatrick, to the [facility]. Please see a copy of my visit note below.     Current Eye Medications:  Latanoprost at bedtime both eyes, last took at 11 PM.     Subjective:  6 month follow up for IOP/HVF/OCT. Vision is OK both eyes distance. Regular reading up close is fine, some trouble with pill bottles. Sometimes when playing pickle ball has hard time seeing green balls, orange are fine. No eye pain or discomfort in either eye.      Objective:  See Ophthalmology Exam.      Assessment:  Missy Kirkpatrick is a 61 year old female who presents with:   Encounter Diagnosis   Name Primary?     Glaucoma suspect of both eyes Intraocular pressure 17/17 today on latanoprost. OCT and visual field stable both eyes. Continue same medication.     OCT optic nerve: avg retinal nerve fiber layer 53/62; thin S, T,I both eyes - stable both eyes.     Sampson visual field (HVF) 24-2: scattered loss right eye; early inf arcuate defect left eye.        Plan:  Continue Latanoprost (green top) at bedtime both eyes     Araceli Martinez MD  [phone]            Again, thank you for allowing me to participate in the care of your patient.        Sincerely,        Araceli Martinez MD

## 2022-05-02 ENCOUNTER — ANCILLARY PROCEDURE (OUTPATIENT)
Dept: MAMMOGRAPHY | Facility: CLINIC | Age: 62
End: 2022-05-02
Attending: FAMILY MEDICINE
Payer: COMMERCIAL

## 2022-05-02 DIAGNOSIS — Z12.31 VISIT FOR SCREENING MAMMOGRAM: ICD-10-CM

## 2022-05-02 PROCEDURE — 77067 SCR MAMMO BI INCL CAD: CPT | Mod: TC | Performed by: RADIOLOGY

## 2022-05-02 PROCEDURE — 77063 BREAST TOMOSYNTHESIS BI: CPT | Mod: TC | Performed by: RADIOLOGY

## 2022-07-06 ENCOUNTER — MYC MEDICAL ADVICE (OUTPATIENT)
Dept: FAMILY MEDICINE | Facility: CLINIC | Age: 62
End: 2022-07-06

## 2022-07-06 DIAGNOSIS — L98.9 SKIN LESION: Primary | ICD-10-CM

## 2022-10-05 ENCOUNTER — OFFICE VISIT (OUTPATIENT)
Dept: OPHTHALMOLOGY | Facility: CLINIC | Age: 62
End: 2022-10-05
Payer: COMMERCIAL

## 2022-10-05 DIAGNOSIS — H40.003 GLAUCOMA SUSPECT OF BOTH EYES: ICD-10-CM

## 2022-10-05 DIAGNOSIS — H52.13 MYOPIA OF BOTH EYES WITH ASTIGMATISM AND PRESBYOPIA: ICD-10-CM

## 2022-10-05 DIAGNOSIS — H52.4 MYOPIA OF BOTH EYES WITH ASTIGMATISM AND PRESBYOPIA: ICD-10-CM

## 2022-10-05 DIAGNOSIS — Z01.00 EXAMINATION OF EYES AND VISION: Primary | ICD-10-CM

## 2022-10-05 DIAGNOSIS — H52.203 MYOPIA OF BOTH EYES WITH ASTIGMATISM AND PRESBYOPIA: ICD-10-CM

## 2022-10-05 PROCEDURE — 92014 COMPRE OPH EXAM EST PT 1/>: CPT | Performed by: STUDENT IN AN ORGANIZED HEALTH CARE EDUCATION/TRAINING PROGRAM

## 2022-10-05 PROCEDURE — 92015 DETERMINE REFRACTIVE STATE: CPT | Performed by: STUDENT IN AN ORGANIZED HEALTH CARE EDUCATION/TRAINING PROGRAM

## 2022-10-05 RX ORDER — LATANOPROST 50 UG/ML
1 SOLUTION/ DROPS OPHTHALMIC AT BEDTIME
Qty: 7.5 ML | Refills: 3 | Status: SHIPPED | OUTPATIENT
Start: 2022-10-05 | End: 2023-09-06

## 2022-10-05 ASSESSMENT — TONOMETRY
IOP_METHOD: APPLANATION
OS_IOP_MMHG: 18
OD_IOP_MMHG: 17

## 2022-10-05 ASSESSMENT — CONF VISUAL FIELD
METHOD: COUNTING FINGERS
OS_NORMAL: 1
OD_NORMAL: 1

## 2022-10-05 ASSESSMENT — REFRACTION_MANIFEST
OD_CYLINDER: +0.50
OD_SPHERE: -0.75
OS_SPHERE: -1.00
OS_CYLINDER: +0.50
OD_ADD: +2.50
OS_ADD: +2.50
OS_AXIS: 060
OD_AXIS: 180

## 2022-10-05 ASSESSMENT — CUP TO DISC RATIO
OS_RATIO: 0.45
OD_RATIO: 0.4

## 2022-10-05 ASSESSMENT — EXTERNAL EXAM - RIGHT EYE: OD_EXAM: NORMAL

## 2022-10-05 ASSESSMENT — SLIT LAMP EXAM - LIDS
COMMENTS: NORMAL
COMMENTS: NORMAL

## 2022-10-05 ASSESSMENT — EXTERNAL EXAM - LEFT EYE: OS_EXAM: NORMAL

## 2022-10-05 ASSESSMENT — VISUAL ACUITY
OD_SC+: -2
CORRECTION_TYPE: GLASSES
OD_SC: 20/25
METHOD: SNELLEN - LINEAR
OS_SC: 20/25

## 2022-10-05 NOTE — PROGRESS NOTES
Current Eye Medications:  Latanoprost at bedtime both eyes, last took at 10:30 pm.      Subjective:  Complete eye exam. Vision is doing fine both eyes in the distance, little more difficult at night. Having to hold things farther away when reading. No eye pain or discomfort in either eye. Patient asked about eye drop friend is taking to help with reading.      Objective:  See Ophthalmology Exam.      Assessment:  Missy Kirkpatrick is a 62 year old female who presents with:   Encounter Diagnoses   Name Primary?     Examination of eyes and vision Yes     Glaucoma suspect of both eyes - on drops Intraocular pressure 17/18 today. Continue same medication.      Myopia of both eyes with astigmatism and presbyopia        Plan:  Continue Latanoprost (green top) at bedtime both eyes     Continue over the counter readers or can fill glasses prescription given     Araceli Martinez MD  [phone]

## 2022-10-05 NOTE — LETTER
10/5/2022         RE: Missy Kirkpatrick  [address] [facility] [address]        Dear Colleague,    Thank you for referring your patient, Missy Kirkpatrick, to the [facility]. Please see a copy of my visit note below.     Current Eye Medications:  Latanoprost at bedtime both eyes, last took at 10:30 pm.      Subjective:  Complete eye exam. Vision is doing fine both eyes in the distance, little more difficult at night. Having to hold things farther away when reading. No eye pain or discomfort in either eye. Patient asked about eye drop friend is taking to help with reading.      Objective:  See Ophthalmology Exam.      Assessment:  Missy Kirkpatrick is a 62 year old female who presents with:   Encounter Diagnoses   Name Primary?     Examination of eyes and vision Yes     Glaucoma suspect of both eyes - on drops Intraocular pressure 17/18 today. Continue same medication.      Myopia of both eyes with astigmatism and presbyopia        Plan:  Continue Latanoprost (green top) at bedtime both eyes     Continue over the counter readers or can fill glasses prescription given     Araceli Martinez MD  [phone]                 Again, thank you for allowing me to participate in the care of your patient.        Sincerely,        Araceli Martinez MD

## 2022-10-05 NOTE — PATIENT INSTRUCTIONS
Continue Latanoprost (green top) at bedtime both eyes     Continue over the counter readers or can fill glasses prescription given     Araceli Martinez MD  [phone]

## 2022-10-31 ENCOUNTER — OFFICE VISIT (OUTPATIENT)
Dept: DERMATOLOGY | Facility: CLINIC | Age: 62
End: 2022-10-31
Attending: FAMILY MEDICINE
Payer: COMMERCIAL

## 2022-10-31 DIAGNOSIS — L82.1 SEBORRHEIC KERATOSIS: ICD-10-CM

## 2022-10-31 DIAGNOSIS — L81.4 LENTIGO: ICD-10-CM

## 2022-10-31 DIAGNOSIS — D22.9 NEVUS: Primary | ICD-10-CM

## 2022-10-31 DIAGNOSIS — Z85.828 HISTORY OF BASAL CELL CARCINOMA (BCC): ICD-10-CM

## 2022-10-31 DIAGNOSIS — D18.01 ANGIOMA OF SKIN: ICD-10-CM

## 2022-10-31 PROCEDURE — 99213 OFFICE O/P EST LOW 20 MIN: CPT | Performed by: PHYSICIAN ASSISTANT

## 2022-10-31 ASSESSMENT — PAIN SCALES - GENERAL: PAINLEVEL: NO PAIN (0)

## 2022-10-31 NOTE — PROGRESS NOTES
HPI:   Chief complaints: Missy Kirkpatrick is a 62 year old female who presents for Full skin cancer screening to rule out skin cancer   Last Skin Exam: 3 years ago     1st Baseline: no  Personal HX of Skin Cancer: Yes  BCC on the upper back 3/2019  Personal HX of Malignant Melanoma: no   Family HX of Skin Cancer / Malignant Melanoma: no  Personal HX of Atypical Moles:   no  Risk factors: history of frequent burns, tans and sun exposure. 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 Has 5.5 grandchildren now and all live close to her.   On review of systems, there are no further skin complaints, patient is feeling otherwise well.  See patient intake sheet.  ROS of the following were done and are negative: Constitutional, Eyes, Ears, Nose,   Mouth, Throat, Cardiovascular, Respiratory, GI, Genitourinary, Musculoskeletal,   Psychiatric, Endocrine, Allergic/Immunologic.    PHYSICAL EXAM:   LMP 10/19/2012   Skin exam performed as follows: Type 2 skin. Mood appropriate  Alert and Oriented X 3. Well developed, well nourished in no distress.  General appearance: Normal  Head including face: Normal  Eyes: conjunctiva and lids: Normal  Mouth: Lips, teeth, gums: Normal  Neck: Normal  Chest-breast/axillae: Normal  Back: Normal  Spleen and liver: Normal  Cardiovascular: Exam of peripheral vascular system by observation for swelling, varicosities, edema: Normal  Genitalia: groin, buttocks: Normal  Extremities: digits/nails (clubbing): Normal  Eccrine and Apocrine glands: Normal  Right upper extremity: Normal  Left upper extremity: Normal  Right lower extremity: Normal  Left lower extremity: Normal  Skin: Scalp and body hair: See below    Pt deferred exam of breasts, groin, buttocks: No    Other physical findings:  1. Multiple pigmented macules on extremities and trunk  2. Multiple pigmented macules on  face, trunk and extremities  3. Multiple vascular papules on trunk, arms and legs  4. Multiple scattered keratotic plaques       Except as noted above, no other signs of skin cancer or melanoma.     ASSESSMENT/PLAN:   Benign Full skin cancer screening today. . Patient with history of BCC  Advised on monthly self exams and 1 year  Patient Education: Appropriate brochures given.    1. Multiple benign appearing nevi on arms, legs and trunk. Discussed ABCDEs of melanoma and sunscreen.   2. Multiple lentigos on arms, legs and trunk. Advised benign, no treatment needed.  3. Multiple scattered angiomas. Advised benign, no treatment needed.   4. Seborrheic keratosis on arms, legs and trunk. Advised benign, no treatment needed.              Follow-up: yearly FSE/PRN sooner    1.) Patient was asked about new and changing moles. YES  2.) Patient received a complete physical skin examination: YES  3.) Patient was counseled to perform a monthly self skin examination: YES  Scribed By: Brandy Hernandez MS, PA-C

## 2022-10-31 NOTE — LETTER
10/31/2022         RE: Missy Kirkpatrick  [address] [facility] [address]        Dear Colleague,    Thank you for referring your patient, Missy Kirkpatrick, to the [facility]. Please see a copy of my visit note below.    HPI:   Chief complaints: Missy Kirkpatrick is a 62 year old female who presents for Full skin cancer screening to rule out skin cancer   Last Skin Exam: 3 years ago     1st Baseline: no  Personal HX of Skin Cancer: Yes  BCC on the upper back 3/2019  Personal HX of Malignant Melanoma: no   Family HX of Skin Cancer / Malignant Melanoma: no  Personal HX of Atypical Moles:   no  Risk factors: history of frequent burns, tans and sun exposure. 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 Has 5.5 grandchildren now and all live close to her.   On review of systems, there are no further skin complaints, patient is feeling otherwise well.  See patient intake sheet.  ROS of the following were done and are negative: Constitutional, Eyes, Ears, Nose,   Mouth, Throat, Cardiovascular, Respiratory, GI, Genitourinary, Musculoskeletal,   Psychiatric, Endocrine, Allergic/Immunologic.    PHYSICAL EXAM:   LMP 10/19/2012   Skin exam performed as follows: Type 2 skin. Mood appropriate  Alert and Oriented X 3. Well developed, well nourished in no distress.  General appearance: Normal  Head including face: Normal  Eyes: conjunctiva and lids: Normal  Mouth: Lips, teeth, gums: Normal  Neck: Normal  Chest-breast/axillae: Normal  Back: Normal  Spleen and liver: Normal  Cardiovascular: Exam of peripheral vascular system by observation for swelling, varicosities, edema: Normal  Genitalia: groin, buttocks: Normal  Extremities: digits/nails (clubbing): Normal  Eccrine and Apocrine glands: Normal  Right upper extremity: Normal  Left upper extremity: Normal  Right lower  extremity: Normal  Left lower extremity: Normal  Skin: Scalp and body hair: See below    Pt deferred exam of breasts, groin, buttocks: No    Other physical findings:  1. Multiple pigmented macules on extremities and trunk  2. Multiple pigmented macules on face, trunk and extremities  3. Multiple vascular papules on trunk, arms and legs  4. Multiple scattered keratotic plaques       Except as noted above, no other signs of skin cancer or melanoma.     ASSESSMENT/PLAN:   Benign Full skin cancer screening today. . Patient with history of BCC  Advised on monthly self exams and 1 year  Patient Education: Appropriate brochures given.    1. Multiple benign appearing nevi on arms, legs and trunk. Discussed ABCDEs of melanoma and sunscreen.   2. Multiple lentigos on arms, legs and trunk. Advised benign, no treatment needed.  3. Multiple scattered angiomas. Advised benign, no treatment needed.   4. Seborrheic keratosis on arms, legs and trunk. Advised benign, no treatment needed.              Follow-up: yearly FSE/PRN sooner    1.) Patient was asked about new and changing moles. YES  2.) Patient received a complete physical skin examination: YES  3.) Patient was counseled to perform a monthly self skin examination: YES  Scribed By: Brandy Hernandez, MS, PATawnyaC          Again, thank you for allowing me to participate in the care of your patient.        Sincerely,        Brandy Hernandez PA-C

## 2022-12-02 ENCOUNTER — MYC MEDICAL ADVICE (OUTPATIENT)
Dept: FAMILY MEDICINE | Facility: CLINIC | Age: 62
End: 2022-12-02

## 2022-12-02 DIAGNOSIS — N89.8 VAGINAL DISCHARGE: Primary | ICD-10-CM

## 2022-12-02 NOTE — TELEPHONE ENCOUNTER
Patient having post-menopausal bleeding/ spotting. Patient had a hysterectomy.  Routing to provider to advise.  Sharri Barillas BSN, RN

## 2022-12-05 ENCOUNTER — LAB (OUTPATIENT)
Dept: LAB | Facility: CLINIC | Age: 62
End: 2022-12-05
Payer: COMMERCIAL

## 2022-12-05 DIAGNOSIS — N89.8 VAGINAL DISCHARGE: ICD-10-CM

## 2022-12-05 LAB
ALBUMIN UR-MCNC: NEGATIVE MG/DL
APPEARANCE UR: CLEAR
BACTERIA #/AREA URNS HPF: NORMAL /HPF
BILIRUB UR QL STRIP: NEGATIVE
CLUE CELLS: ABNORMAL
COLOR UR AUTO: YELLOW
GLUCOSE UR STRIP-MCNC: NEGATIVE MG/DL
HGB UR QL STRIP: NEGATIVE
KETONES UR STRIP-MCNC: NEGATIVE MG/DL
LEUKOCYTE ESTERASE UR QL STRIP: ABNORMAL
NITRATE UR QL: NEGATIVE
PH UR STRIP: 6.5 [PH] (ref 5–7)
RBC #/AREA URNS AUTO: NORMAL /HPF
SP GR UR STRIP: 1.01 (ref 1–1.03)
TRICHOMONAS, WET PREP: ABNORMAL
UROBILINOGEN UR STRIP-ACNC: 0.2 E.U./DL
WBC #/AREA URNS AUTO: NORMAL /HPF
WBC'S/HIGH POWER FIELD, WET PREP: ABNORMAL
YEAST, WET PREP: ABNORMAL

## 2022-12-05 PROCEDURE — 81001 URINALYSIS AUTO W/SCOPE: CPT

## 2022-12-05 PROCEDURE — 87210 SMEAR WET MOUNT SALINE/INK: CPT

## 2023-01-23 NOTE — PROGRESS NOTES
SUBJECTIVE:   CC: Missy is an 62 year old who presents for preventive health visit.     Patient has been advised of split billing requirements and indicates understanding: Yes  Healthy Habits:     Getting at least 3 servings of Calcium per day:  Yes    Bi-annual eye exam:  Yes    Dental care twice a year:  Yes    Sleep apnea or symptoms of sleep apnea:  None    Diet:  Regular (no restrictions)    Frequency of exercise:  4-5 days/week    Duration of exercise:  45-60 minutes    Taking medications regularly:  Yes    Medication side effects:  Not applicable    PHQ-2 Total Score: 0    Additional concerns today:  Yes      Brown discharge on/off.  H/o total hysterectomy, no pain, occas red blood.  Possible association with sex.  Wet prep during symptoms neg. No symptoms for a few weeks.             Today's PHQ-2 Score:   PHQ-2 ( 1999 [employer]) 1/24/2023   Q1: Little interest or pleasure in doing things 0   Q2: Feeling down, depressed or hopeless 0   PHQ-2 Score 0   PHQ-2 Total Score (12-17 Years)- Positive if 3 or more points; Administer PHQ-A if positive -   Q1: Little interest or pleasure in doing things Not at all   Q2: Feeling down, depressed or hopeless Not at all   PHQ-2 Score 0           Social History     Tobacco Use     Smoking status: Never     Smokeless tobacco: Never     Tobacco comments:     Nonsmoking household   Substance Use Topics     Alcohol use: Yes     Comment: Occasionally         Alcohol Use 1/24/2023   Prescreen: >3 drinks/day or >7 drinks/week? No   Prescreen: >3 drinks/day or >7 drinks/week? -       Reviewed orders with patient.  Reviewed health maintenance and updated orders accordingly - Yes    G 3 P 3   Patient's last menstrual period was 10/19/2012.     Fasting: Yes    Td: tdap 1/2022       Flu: 10/2022      Covid: Mod 4/17/2021      Shingrix: done      PPV: NA      Status post benign hysterectomy. Health Maintenance and Surgical History updated.               Cholesterol:   Lab Results    Component Value Date    CHOL 224 01/10/2022    CHOL 212 03/10/2020     Lab Results   Component Value Date    HDL 56 01/10/2022    HDL 68 03/10/2020     Lab Results   Component Value Date     01/10/2022     03/10/2020     Lab Results   Component Value Date    TRIG 211 01/10/2022    TRIG 74 03/10/2020     Lab Results   Component Value Date    CHOLHDLRATIO 3.2 10/21/2015         MM2022  Dexa:  NA     Flex/colo: 2010 q10y scope      Seat Belt: Yes    Sunscreen use: Yes   Calcium Intake: adeq  Health Care Directive: Yes   Sexually Active: Yes     Current contraception: hysterectomy  History of abnormal Pap smear: No  Family history of colon/breast/ovarian cancer: No  Regular self breast exam: Yes  History of abnormal mammogram: No      Labs reviewed in EPIC  BP Readings from Last 3 Encounters:   23 129/81   01/10/22 130/82   20 122/82    Wt Readings from Last 3 Encounters:   23 102.1 kg (225 lb)   01/10/22 106.1 kg (234 lb)   20 102.8 kg (226 lb 9.6 oz)                  Patient Active Problem List   Diagnosis     CARDIOVASCULAR SCREENING; LDL GOAL LESS THAN 160     Female stress incontinence     Factor 5 Leiden mutation, heterozygous (H)     Multiple thyroid nodules     S/P partial thyroidectomy     Advanced directives, counseling/discussion     Right rotator cuff tendonitis     Complete tear of right rotator cuff     Past Surgical History:   Procedure Laterality Date     ABDOMEN SURGERY  2012    Hysterectomy     APPENDECTOMY       BIOPSY  2012    utererus     COLONOSCOPY  2012     ENT SURGERY  1994    Thyroid issue     ORTHOPEDIC SURGERY  1976    [place]     TUBAL LIGATION       [facility] ANESTH,CLEFT PALATE REPAIR       [facility] ANESTH,ELBOW,NOS       [facility] REMOVE BENIGN THYROID LESION  2002    nodule removed      [facility] TOTAL ABDOM HYSTERECTOMY  2012    pathology benign, no further paps needed       Social History     Tobacco Use      Smoking status: Never     Smokeless tobacco: Never     Tobacco comments:     Nonsmoking household   Substance Use Topics     Alcohol use: Yes     Comment: Occasionally     Family History   Problem Relation Age of Onset     Cerebrovascular Disease Mother      Eye Disorder Mother         glaucoma     Heart Disease Mother      Diabetes Mother         blindness     Hypertension Mother      Eye Surgery Mother         cataracts     Cataracts Mother      Other Cancer Brother         Lung Cancer     Diabetes Brother      Lung Cancer Brother      Blood Disease Brother      Obesity Brother         Bariatric surgery     Blood Disease Brother      Diabetes Brother      Blood Disease Brother      Colon Cancer Brother      Rectal Cancer Brother      Hypertension Brother      Other Cancer Brother         Pancreatic Cancer     Pancreatic Cancer Brother      Blood Disease Brother      Blood Disease Son 27        blood clots     Osteoporosis Son         July 2020     Melanoma No family hx of      Glaucoma No family hx of      Macular Degeneration No family hx of          Current Outpatient Medications   Medication Sig Dispense Refill     latanoprost (XALATAN) 0.005 % ophthalmic solution Place 1 drop into both eyes At Bedtime 7.5 mL 3     MULTIVITAMIN TABS   OR 1 TABLET DAILY       tretinoin (RETIN-A) 0.05 % external cream Spread a pea size amount into affected area topically at bedtime.  Use sunscreen SPF>20. 45 g 6       Breast Cancer Screening:  Any new diagnosis of family breast, ovarian, or bowel cancer? No    FHS-7:   Breast CA Risk Assessment (FHS-7) 1/10/2022 5/2/2022 1/24/2023   Did any of your first-degree relatives have breast or ovarian cancer? No No No   Did any of your relatives have bilateral breast cancer? No No No   Did any man in your family have breast cancer? - No No   Did any woman in your family have breast and ovarian cancer? - No No   Did any woman in your family have breast cancer before age 50 y? - No No  "  Do you have 2 or more relatives with breast and/or ovarian cancer? - No No   Do you have 2 or more relatives with breast and/or bowel cancer? - No No       Mammogram Screening: Recommended mammography every 1-2 years with patient discussion and risk factor consideration  Pertinent mammograms are reviewed under the imaging tab.      Reviewed and updated as needed this visit by clinical staff   Tobacco  Allergies  Meds  Problems  Med Hx  Surg Hx  Fam Hx          Reviewed and updated as needed this visit by Provider   Tobacco  Allergies  Meds  Problems  Med Hx  Surg Hx  Fam Hx             Review of Systems   Constitutional: Negative for chills and fever.   HENT: Negative for congestion, ear pain, hearing loss and sore throat.    Eyes: Negative for pain and visual disturbance.   Respiratory: Negative for cough and shortness of breath.    Cardiovascular: Negative for chest pain, palpitations and peripheral edema.   Gastrointestinal: Negative for abdominal pain, constipation, diarrhea, heartburn, hematochezia and nausea.   Breasts:  Negative for tenderness, breast mass and discharge.   Genitourinary: Positive for vaginal discharge. Negative for dysuria, frequency, genital sores, hematuria, pelvic pain, urgency and vaginal bleeding.   Musculoskeletal: Positive for joint swelling. Negative for arthralgias and myalgias.   Skin: Negative for rash.   Neurological: Negative for dizziness, weakness, headaches and paresthesias.   Psychiatric/Behavioral: Negative for mood changes. The patient is not nervous/anxious.    wondering about medication for wt loss         OBJECTIVE:   /81   Pulse 74   Temp 98.4  F (36.9  C) (Oral)   Resp 18   Ht 1.702 m (5' 7\")   Wt 102.1 kg (225 lb)   LMP 10/19/2012   SpO2 98%   BMI 35.24 kg/m    Physical Exam  GENERAL APPEARANCE: healthy, alert and no distress  EYES: Eyes grossly normal to inspection, PERRL and conjunctivae and sclerae normal  HENT: ear canals and TM's " normal, nose and mouth without ulcers or lesions, oropharynx clear and oral mucous membranes moist  NECK: no adenopathy, no asymmetry, masses, or scars and thyroid normal to palpation  RESP: lungs clear to auscultation - no rales, rhonchi or wheezes  BREAST: normal without masses, tenderness or nipple discharge and no palpable axillary masses or adenopathy  CV: regular rate and rhythm, normal S1 S2, no S3 or S4, no murmur, click or rub, no peripheral edema and peripheral pulses strong  ABDOMEN: soft, nontender, no hepatosplenomegaly, no masses and bowel sounds normal   (female): normal female external genitalia, normal urethral meatus and vaginal mucosal atrophy noted  MS: no musculoskeletal defects are noted and gait is age appropriate without ataxia  SKIN: no suspicious lesions or rashes  NEURO: Normal strength and tone, sensory exam grossly normal, mentation intact and speech normal  PSYCH: mentation appears normal and affect normal/bright    Diagnostic Test Results:  Labs reviewed in Epic    ASSESSMENT/PLAN:   (Z00.00) Routine general medical examination at a health care facility  (primary encounter diagnosis)  Comment: preventive needs reviewed   Plan: see orders in Epic.       (D68.51) Factor 5 Leiden mutation, heterozygous (H)  Comment: stable, no symptoms of DVT  Plan: OFFICE/OUTPT VISIT,EST,LEVL III        Not on anticoagulation, no h/o DVT  Avoid systemic hormone therapy    (E04.2) Multiple thyroid nodules  Comment: no recent imaging  Plan: TSH WITH FREE T4 REFLEX, OFFICE/OUTPT         VISIT,EST,LEVL III, US Thyroid        Labs today, US ordered      (E66.09,  Z68.35) Class 2 obesity due to excess calories without serious comorbidity with body mass index (BMI) of 35.0 to 35.9 in adult  (Z79.899) High risk medication use  Comment: 10 pound wt loss over 12 months with significant effort  Plan: CBC with platelets, topiramate (TOPAMAX) 25 MG         tablet        Start topamax taper, Discussed potential side  effects and actions to take if they occur.       (N95.2) Vaginal atrophy  Comment: likely source of discharge/bleeding and very likely related to sex  Plan: reassurance, consider vaginal estrogen       (Z13.1) Screening for diabetes mellitus  Comment: due  Plan: Glucose            (Z13.6) CARDIOVASCULAR SCREENING; LDL GOAL LESS THAN 160  Comment: due  Plan: Lipid panel reflex to direct LDL Fasting            (Z12.11) Special screening for malignant neoplasms, colon  Comment: due, normal colonoscopy 2010  Plan: COLOGUARD([employer])                      COUNSELING:  Reviewed preventive health counseling, as reflected in patient instructions  Special attention given to:        Regular exercise       Healthy diet/nutrition        She reports that she has never smoked. She has never used smokeless tobacco.          Shell Osuna MD  [facility]

## 2023-01-24 ENCOUNTER — LAB (OUTPATIENT)
Dept: FAMILY MEDICINE | Facility: CLINIC | Age: 63
End: 2023-01-24

## 2023-01-24 ENCOUNTER — OFFICE VISIT (OUTPATIENT)
Dept: FAMILY MEDICINE | Facility: CLINIC | Age: 63
End: 2023-01-24
Payer: COMMERCIAL

## 2023-01-24 VITALS
DIASTOLIC BLOOD PRESSURE: 81 MMHG | HEIGHT: 67 IN | HEART RATE: 74 BPM | TEMPERATURE: 98.4 F | SYSTOLIC BLOOD PRESSURE: 129 MMHG | BODY MASS INDEX: 35.31 KG/M2 | WEIGHT: 225 LBS | RESPIRATION RATE: 18 BRPM | OXYGEN SATURATION: 98 %

## 2023-01-24 DIAGNOSIS — Z12.11 SPECIAL SCREENING FOR MALIGNANT NEOPLASMS, COLON: ICD-10-CM

## 2023-01-24 DIAGNOSIS — E66.812 CLASS 2 OBESITY DUE TO EXCESS CALORIES WITHOUT SERIOUS COMORBIDITY WITH BODY MASS INDEX (BMI) OF 35.0 TO 35.9 IN ADULT: ICD-10-CM

## 2023-01-24 DIAGNOSIS — Z13.1 SCREENING FOR DIABETES MELLITUS: ICD-10-CM

## 2023-01-24 DIAGNOSIS — D68.51 FACTOR 5 LEIDEN MUTATION, HETEROZYGOUS (H): ICD-10-CM

## 2023-01-24 DIAGNOSIS — N95.2 VAGINAL ATROPHY: ICD-10-CM

## 2023-01-24 DIAGNOSIS — Z79.899 HIGH RISK MEDICATION USE: ICD-10-CM

## 2023-01-24 DIAGNOSIS — Z13.6 CARDIOVASCULAR SCREENING; LDL GOAL LESS THAN 160: ICD-10-CM

## 2023-01-24 DIAGNOSIS — E66.09 CLASS 2 OBESITY DUE TO EXCESS CALORIES WITHOUT SERIOUS COMORBIDITY WITH BODY MASS INDEX (BMI) OF 35.0 TO 35.9 IN ADULT: ICD-10-CM

## 2023-01-24 DIAGNOSIS — E04.2 MULTIPLE THYROID NODULES: ICD-10-CM

## 2023-01-24 DIAGNOSIS — Z00.00 ROUTINE GENERAL MEDICAL EXAMINATION AT A HEALTH CARE FACILITY: Primary | ICD-10-CM

## 2023-01-24 LAB
CHOLEST SERPL-MCNC: 239 MG/DL
ERYTHROCYTE [DISTWIDTH] IN BLOOD BY AUTOMATED COUNT: 13.3 % (ref 10–15)
FASTING STATUS PATIENT QL REPORTED: NO
FASTING STATUS PATIENT QL REPORTED: NO
GLUCOSE BLD-MCNC: 91 MG/DL (ref 70–99)
HCT VFR BLD AUTO: 42.8 % (ref 35–47)
HDLC SERPL-MCNC: 78 MG/DL
HGB BLD-MCNC: 14.4 G/DL (ref 11.7–15.7)
LDLC SERPL CALC-MCNC: 135 MG/DL
MCH RBC QN AUTO: 30.7 PG (ref 26.5–33)
MCHC RBC AUTO-ENTMCNC: 33.6 G/DL (ref 31.5–36.5)
MCV RBC AUTO: 91 FL (ref 78–100)
NONHDLC SERPL-MCNC: 161 MG/DL
PLATELET # BLD AUTO: 241 10E3/UL (ref 150–450)
RBC # BLD AUTO: 4.69 10E6/UL (ref 3.8–5.2)
TRIGL SERPL-MCNC: 128 MG/DL
TSH SERPL DL<=0.005 MIU/L-ACNC: 0.94 MU/L (ref 0.4–4)
WBC # BLD AUTO: 4.2 10E3/UL (ref 4–11)

## 2023-01-24 PROCEDURE — 36415 COLL VENOUS BLD VENIPUNCTURE: CPT | Performed by: FAMILY MEDICINE

## 2023-01-24 PROCEDURE — 82947 ASSAY GLUCOSE BLOOD QUANT: CPT | Performed by: FAMILY MEDICINE

## 2023-01-24 PROCEDURE — 99396 PREV VISIT EST AGE 40-64: CPT | Performed by: FAMILY MEDICINE

## 2023-01-24 PROCEDURE — 80061 LIPID PANEL: CPT | Performed by: FAMILY MEDICINE

## 2023-01-24 PROCEDURE — 85027 COMPLETE CBC AUTOMATED: CPT | Performed by: FAMILY MEDICINE

## 2023-01-24 PROCEDURE — 84443 ASSAY THYROID STIM HORMONE: CPT | Performed by: FAMILY MEDICINE

## 2023-01-24 PROCEDURE — 99214 OFFICE O/P EST MOD 30 MIN: CPT | Mod: 25 | Performed by: FAMILY MEDICINE

## 2023-01-24 RX ORDER — TOPIRAMATE 25 MG/1
TABLET, FILM COATED ORAL
Qty: 162 TABLET | Refills: 0 | Status: SHIPPED | OUTPATIENT
Start: 2023-01-24 | End: 2023-04-25

## 2023-01-24 ASSESSMENT — ENCOUNTER SYMPTOMS
FEVER: 0
PARESTHESIAS: 0
DIZZINESS: 0
NERVOUS/ANXIOUS: 0
MYALGIAS: 0
NAUSEA: 0
WEAKNESS: 0
SHORTNESS OF BREATH: 0
HEARTBURN: 0
HEADACHES: 0
DYSURIA: 0
EYE PAIN: 0
FREQUENCY: 0
ARTHRALGIAS: 0
BREAST MASS: 0
SORE THROAT: 0
COUGH: 0
HEMATOCHEZIA: 0
CHILLS: 0
ABDOMINAL PAIN: 0
DIARRHEA: 0
CONSTIPATION: 0
PALPITATIONS: 0
JOINT SWELLING: 1
HEMATURIA: 0

## 2023-01-24 ASSESSMENT — PAIN SCALES - GENERAL: PAINLEVEL: NO PAIN (0)

## 2023-02-04 LAB — NONINV COLON CA DNA+OCC BLD SCRN STL QL: NEGATIVE

## 2023-03-29 ENCOUNTER — TRANSFERRED RECORDS (OUTPATIENT)
Dept: HEALTH INFORMATION MANAGEMENT | Facility: CLINIC | Age: 63
End: 2023-03-29

## 2023-04-05 ENCOUNTER — OFFICE VISIT (OUTPATIENT)
Dept: OPHTHALMOLOGY | Facility: CLINIC | Age: 63
End: 2023-04-05
Payer: COMMERCIAL

## 2023-04-05 DIAGNOSIS — H40.003 GLAUCOMA SUSPECT OF BOTH EYES: Primary | ICD-10-CM

## 2023-04-05 PROCEDURE — 92133 CPTRZD OPH DX IMG PST SGM ON: CPT | Performed by: STUDENT IN AN ORGANIZED HEALTH CARE EDUCATION/TRAINING PROGRAM

## 2023-04-05 PROCEDURE — 92012 INTRM OPH EXAM EST PATIENT: CPT | Performed by: STUDENT IN AN ORGANIZED HEALTH CARE EDUCATION/TRAINING PROGRAM

## 2023-04-05 PROCEDURE — 92083 EXTENDED VISUAL FIELD XM: CPT | Performed by: STUDENT IN AN ORGANIZED HEALTH CARE EDUCATION/TRAINING PROGRAM

## 2023-04-05 ASSESSMENT — CUP TO DISC RATIO
OD_RATIO: 0.4
OS_RATIO: 0.45

## 2023-04-05 ASSESSMENT — SLIT LAMP EXAM - LIDS
COMMENTS: NORMAL
COMMENTS: NORMAL

## 2023-04-05 ASSESSMENT — VISUAL ACUITY
OS_SC: 20/30
OD_SC+: -2
METHOD: SNELLEN - LINEAR
OD_SC: 20/25

## 2023-04-05 ASSESSMENT — EXTERNAL EXAM - RIGHT EYE: OD_EXAM: NORMAL

## 2023-04-05 ASSESSMENT — EXTERNAL EXAM - LEFT EYE: OS_EXAM: NORMAL

## 2023-04-05 ASSESSMENT — TONOMETRY
IOP_METHOD: APPLANATION
OS_IOP_MMHG: 15
OD_IOP_MMHG: 15

## 2023-04-05 NOTE — PROGRESS NOTES
Current Eye Medications:  Latanoprost - both eyes at bedtime - last dose: 10:00pm last night     Subjective:  Here for glaucoma testing. Compliant with nightly use of drops. Vision overall stable.      Objective:  See Ophthalmology Exam.       Assessment:  Missy Kirkpatrick is a 62 year old female who presents with:   Encounter Diagnosis   Name Primary?     Glaucoma suspect of both eyes - on drops Intraocular pressure 15/15 today. Continue same medication.    OCT optic nerve: avg retinal nerve fiber layer 57/61; thin S,T,I both eyes - stable both eyes.     Sampson visual field (HVF) 24-2: mild scattered loss both eyes.        Plan:  Continue Latanoprost (green top) at bedtime both eyes     Araceli Martinez MD  [phone]

## 2023-04-05 NOTE — LETTER
4/5/2023         RE: Missy Kirkpatrick  [address] [facility] [address]        Dear Colleague,    Thank you for referring your patient, Missy Kirkpatrick, to the [facility]. Please see a copy of my visit note below.     Current Eye Medications:  Latanoprost - both eyes at bedtime - last dose: 10:00pm last night     Subjective:  Here for glaucoma testing. Compliant with nightly use of drops. Vision overall stable.      Objective:  See Ophthalmology Exam.       Assessment:  Missy Kirkpatrick is a 62 year old female who presents with:   Encounter Diagnosis   Name Primary?     Glaucoma suspect of both eyes - on drops Intraocular pressure 15/15 today. Continue same medication.    OCT optic nerve: avg retinal nerve fiber layer 57/61; thin S,T,I both eyes - stable both eyes.     Sampson visual field (HVF) 24-2: mild scattered loss both eyes.        Plan:  Continue Latanoprost (green top) at bedtime both eyes     Araceli Martinez MD  [phone]          Again, thank you for allowing me to participate in the care of your patient.        Sincerely,        Araceli Martinez MD

## 2023-04-18 ENCOUNTER — ANCILLARY PROCEDURE (OUTPATIENT)
Dept: ULTRASOUND IMAGING | Facility: CLINIC | Age: 63
End: 2023-04-18
Attending: FAMILY MEDICINE
Payer: COMMERCIAL

## 2023-04-18 DIAGNOSIS — E04.2 MULTIPLE THYROID NODULES: ICD-10-CM

## 2023-04-18 PROCEDURE — 76536 US EXAM OF HEAD AND NECK: CPT | Mod: TC | Performed by: RADIOLOGY

## 2023-04-19 ENCOUNTER — MYC MEDICAL ADVICE (OUTPATIENT)
Dept: FAMILY MEDICINE | Facility: CLINIC | Age: 63
End: 2023-04-19
Payer: COMMERCIAL

## 2023-04-19 DIAGNOSIS — E04.2 MULTIPLE THYROID NODULES: Primary | ICD-10-CM

## 2023-04-24 DIAGNOSIS — Z79.899 HIGH RISK MEDICATION USE: ICD-10-CM

## 2023-04-24 DIAGNOSIS — E66.812 CLASS 2 OBESITY DUE TO EXCESS CALORIES WITHOUT SERIOUS COMORBIDITY WITH BODY MASS INDEX (BMI) OF 35.0 TO 35.9 IN ADULT: ICD-10-CM

## 2023-04-24 DIAGNOSIS — E66.09 CLASS 2 OBESITY DUE TO EXCESS CALORIES WITHOUT SERIOUS COMORBIDITY WITH BODY MASS INDEX (BMI) OF 35.0 TO 35.9 IN ADULT: ICD-10-CM

## 2023-04-24 NOTE — TELEPHONE ENCOUNTER
Requested Prescriptions   Pending Prescriptions Disp Refills     topiramate (TOPAMAX) 25 MG tablet 162 tablet 0     Sig: Take 1 tablet (25 mg) by mouth daily for 7 days, THEN 2 tablets (50 mg) daily for 7 days, THEN 3 tablets (75 mg) daily for 7 days, THEN 4 tablets (100 mg) daily for 30 days.       There is no refill protocol information for this order

## 2023-04-25 RX ORDER — TOPIRAMATE 25 MG/1
75 TABLET, FILM COATED ORAL DAILY
Qty: 270 TABLET | Refills: 1 | Status: SHIPPED | OUTPATIENT
Start: 2023-04-25 | End: 2024-05-28

## 2023-04-25 NOTE — TELEPHONE ENCOUNTER
Called and spoke with patient. She has been taking 3 tablets daily (75 mg). This dose has been effective for her. She never moved up to the 4 tabs daily as felt 75 mg has been working well.    Routing to provider to update.      Justine Waldrop RN  Clinical Triage/Primary Care  [facility]

## 2023-05-29 ENCOUNTER — MYC MEDICAL ADVICE (OUTPATIENT)
Dept: FAMILY MEDICINE | Facility: CLINIC | Age: 63
End: 2023-05-29
Payer: COMMERCIAL

## 2023-05-30 ENCOUNTER — TELEPHONE (OUTPATIENT)
Dept: ENDOCRINOLOGY | Facility: CLINIC | Age: 63
End: 2023-05-30
Payer: COMMERCIAL

## 2023-05-30 NOTE — TELEPHONE ENCOUNTER
Spoke w/ Dr Osuna. Confirms understanding of review and recommendation of timeline. She will convey message to pt.   Isa Machado, RN on 5/30/2023 at 1:22 PM       RE    Bee Durand MD     LB    4/23/23  6:34 PM  Note  Endocrine triage  The scheduled first available endocrine appointment timeframe is acceptable.    Bee Durand MD

## 2023-07-10 NOTE — CONFIDENTIAL NOTE
RECORDS RECEIVED FROM: internal    DATE RECEIVED: 9.26.23    NOTES (FOR ALL VISITS) STATUS DETAILS   OFFICE NOTES from referring provider internal  Shell Osuna MD     MEDICATION LIST internal       ULTRASOUND (HEAD/NECK) internal  4/18/23    LABS     DIABETES: HBGA1C, CREATININE, FASTING LIPIDS, MICROALBUMIN URINE, POTASSIUM, TSH, T4    THYROID: TSH, T4, CBC, THYRODLONULIN, TOTAL T3, FREE T4, CALCITONIN, CEA internal  Cbc- 1.24.23  Glucose - 1.24.23  Lipid- 1.24.23  TSH- 1.24.23  T4- 1.24.23

## 2023-07-27 ENCOUNTER — ANCILLARY PROCEDURE (OUTPATIENT)
Dept: MAMMOGRAPHY | Facility: CLINIC | Age: 63
End: 2023-07-27
Attending: FAMILY MEDICINE
Payer: COMMERCIAL

## 2023-07-27 DIAGNOSIS — Z12.31 VISIT FOR SCREENING MAMMOGRAM: ICD-10-CM

## 2023-07-27 PROCEDURE — 77067 SCR MAMMO BI INCL CAD: CPT | Mod: TC | Performed by: RADIOLOGY

## 2023-07-27 PROCEDURE — 77063 BREAST TOMOSYNTHESIS BI: CPT | Mod: TC | Performed by: RADIOLOGY

## 2023-08-03 ENCOUNTER — MYC MEDICAL ADVICE (OUTPATIENT)
Dept: FAMILY MEDICINE | Facility: CLINIC | Age: 63
End: 2023-08-03
Payer: COMMERCIAL

## 2023-09-06 DIAGNOSIS — H40.003 GLAUCOMA SUSPECT OF BOTH EYES: ICD-10-CM

## 2023-09-06 RX ORDER — LATANOPROST 50 UG/ML
1 SOLUTION/ DROPS OPHTHALMIC AT BEDTIME
Qty: 7.5 ML | Refills: 3 | Status: SHIPPED | OUTPATIENT
Start: 2023-09-06 | End: 2024-09-05

## 2023-09-06 NOTE — TELEPHONE ENCOUNTER
Received refill request for Latanoprost. Will send to [facility] for patient to fill.

## 2023-09-26 ENCOUNTER — PRE VISIT (OUTPATIENT)
Dept: ENDOCRINOLOGY | Facility: CLINIC | Age: 63
End: 2023-09-26

## 2023-09-26 ENCOUNTER — VIRTUAL VISIT (OUTPATIENT)
Dept: ENDOCRINOLOGY | Facility: CLINIC | Age: 63
End: 2023-09-26
Attending: FAMILY MEDICINE
Payer: COMMERCIAL

## 2023-09-26 VITALS — BODY MASS INDEX: 34.46 KG/M2 | WEIGHT: 220 LBS

## 2023-09-26 DIAGNOSIS — E04.2 MULTIPLE THYROID NODULES: ICD-10-CM

## 2023-09-26 PROCEDURE — 99204 OFFICE O/P NEW MOD 45 MIN: CPT | Mod: VID | Performed by: STUDENT IN AN ORGANIZED HEALTH CARE EDUCATION/TRAINING PROGRAM

## 2023-09-26 NOTE — LETTER
9/26/2023       RE: Missy Kirkpatrick  [address] [facility] [address]     Dear Colleague,    Thank you for referring your patient, Missy Kirkpatrick, to the [facility] ENDOCRINOLOGY CLINIC [place] [facility]. Please see a copy of my visit note below.    Endocrinology Clinic Visit 9/26/2023      Video-Visit Details    Type of service:  Video Visit    Joined the call at 9/26/2023, 12:35:26 pm.  Left the call at 9/26/2023, 1:06:33 pm.    Originating Location (pt. Location): Home        Distant Location (provider location):  Off-site    Mode of Communication:  Video Conference via [facility]    Physician has received verbal consent for a Video Visit from the patient? Yes    I spent a total of 45 minutes on the date of encounter reviewing medical records, evaluating the patient, coordinating care and documenting in the EHR, as detailed above.      NAME:  Missy Kirkpatrick  PCP:  Shell Osuna  MRN:  [id number]  Reason for Consult: Thyroid nodules  Requesting Provider:  Shell Osuna    Chief Complaint     Chief Complaint   Patient presents with    Consult    Video Visit       History of Present Illness     Missy Kirkpatrick is a 63 year old female who is seen in video visit for thyroid nodule.    She has hx of left partial thyroidectomy 26 years ago. She said at that time her TSH was off, thyroid uptake and scan along with US were done. FNA of nodule was done and she was told it is precancerous. She underwent partial left thyroidectomy since then she had no further thyroid US, TSH has been normal and did not need to take lt4 replacement. She is not sure what was the pathology result of the left lobectomy, but she was never told she has thyroid cancer. She does not remember where the work up and surgery done.    She had thyroid US 4/2023 done per PCP for follow up. She never felt any localized neck symptoms. I reviewed the images  with her. 2 small nodules, superior nodule is subcentemetric. Mid right nodule is 1.2 cm by biggest dimension, mostly isoechoic.      She denied any hx of radiation.  She denied any family hx of thyroid cancer. She reported 2 brothers with pancreatic cancer, one with colon cancer, another one with lung cancer.      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     Problem List     Patient Active Problem List   Diagnosis    CARDIOVASCULAR SCREENING; LDL GOAL LESS THAN 160    Female stress incontinence    Factor 5 Leiden mutation, heterozygous (H)    Multiple thyroid nodules    S/P partial thyroidectomy    Advanced directives, counseling/discussion    Right rotator cuff tendonitis    Complete tear of right rotator cuff        Medications     Current Outpatient Medications   Medication    latanoprost (XALATAN) 0.005 % ophthalmic solution    MULTIVITAMIN TABS   OR    topiramate (TOPAMAX) 25 MG tablet    tretinoin (RETIN-A) 0.05 % external cream     No current facility-administered medications for this visit.        Allergies     No Known Allergies    Medical / Surgical History     Past Medical History:   Diagnosis Date    Arthritis     Basal cell carcinoma     Factor 5 Leiden mutation, heterozygous (H)     Factor 5 Leiden mutation, heterozygous (H)     Factor 5 Leiden mutation, heterozygous (H)     Glaucoma (increased eye pressure)     Hyperlipidemia LDL goal < 100     Morbid obesity (H)     Thyroid disease February 1994    Precancerous Thyroid - 1/2 removed     Past Surgical History:   Procedure Laterality Date    ABDOMEN SURGERY  December 2012    Hysterectomy    APPENDECTOMY      BIOPSY  November 2012    utererus    COLONOSCOPY  October 2012    ENT SURGERY  February 1994    Thyroid issue    ORTHOPEDIC SURGERY  01/1976    [place]    TUBAL LIGATION      [facility] ANESTH,CLEFT PALATE REPAIR      [facility] ANESTH,ELBOW,NOS  01/76    [facility] REMOVE BENIGN THYROID LESION  01/2002    nodule removed     [facility] TOTAL ABDOM HYSTERECTOMY   12/14/2012    pathology benign, no further paps needed       Social History     Social History     Socioeconomic History    Marital status:      Spouse name: Not on file    Number of children: Not on file    Years of education: Not on file    Highest education level: Not on file   Occupational History    Not on file   Tobacco Use    Smoking status: Never    Smokeless tobacco: Never    Tobacco comments:     Nonsmoking household   Vaping Use    Vaping Use: Never used   Substance and Sexual Activity    Alcohol use: Yes     Comment: Occasionally    Drug use: No    Sexual activity: Yes     Partners: Male     Birth control/protection: Female Surgical     Comment: tubal ligation   Other Topics Concern    Parent/sibling w/ CABG, MI or angioplasty before 65F 55M? Yes     Comment: Brother   Social History Narrative    Not on file     Social Determinants of Health     Financial Resource Strain: Not on file   Food Insecurity: Not on file   Transportation Needs: Not on file   Physical Activity: Not on file   Stress: Not on file   Social Connections: Not on file   Interpersonal Safety: Not on file   Housing Stability: Not on file       Family History     Family History   Problem Relation Age of Onset    Cerebrovascular Disease Mother     Eye Disorder Mother         glaucoma    Heart Disease Mother     Diabetes Mother         blindness    Hypertension Mother     Eye Surgery Mother         cataracts    Cataracts Mother     Other Cancer Brother         Lung Cancer    Diabetes Brother     Lung Cancer Brother     Blood Disease Brother     Obesity Brother         Bariatric surgery    Blood Disease Brother     Diabetes Brother     Blood Disease Brother     Colon Cancer Brother     Rectal Cancer Brother     Hypertension Brother     Other Cancer Brother         Pancreatic Cancer    Pancreatic Cancer Brother     Blood Disease Brother     Blood Disease Son 27        blood clots    Osteoporosis Son         July 2020    Melanoma No  family hx of     Glaucoma No family hx of     Macular Degeneration No family hx of        ROS     12 ROS completed, pertinent positive and negative in HPI    Physical Exam   Wt 99.8 kg (220 lb)   LMP 10/19/2012   BMI 34.46 kg/m     GENERAL: Healthy, alert and no distress  EYES: Eyes grossly normal to inspection.  No discharge or erythema, or obvious scleral/conjunctival abnormalities.  RESP: No audible wheeze, cough, or visible cyanosis.  No visible retractions or increased work of breathing.    SKIN: Visible skin clear. No significant rash, abnormal pigmentation or lesions.  NEURO: Cranial nerves grossly intact.  Mentation and speech appropriate for age.  PSYCH: Mentation appears normal, affect normal/bright, judgement and insight intact, normal speech and appearance well-groomed.     Labs/Imaging     Pertinent Labs were reviewed and updated in EPIC and discussed briefly.  Radiology Results were  reviewed and updated in EPIC and discussed in details.    Summary of recent findings:   No results found for: A1C    TSH   Date Value Ref Range Status   01/24/2023 0.94 0.40 - 4.00 mU/L Final   01/10/2022 0.60 0.40 - 4.00 mU/L Final   07/23/2020 0.98 0.40 - 4.00 mU/L Final   11/14/2017 0.86 0.40 - 4.00 mU/L Final   09/30/2014 0.61 0.40 - 4.00 mU/L Final     Comment:     Effective 7/30/2014, the reference range for this assay has changed to reflect   new instrumentation/methodology.     11/27/2012 1.31 0.4 - 5.0 mU/L Final   09/30/2010 1.09 0.4 - 5.0 mU/L Final     T4 Free   Date Value Ref Range Status   09/30/2010 1.06 0.70 - 1.85 ng/dL Final       Creatinine   Date Value Ref Range Status   10/09/2009 0.91 0.52 - 1.04 mg/dL Final     Comment:     New IDMS-traceable calibration  beginning 5/1/08       Recent Labs   Lab Test 01/24/23  1000 01/10/22  1645 12/26/18  0940 10/21/15  0823   CHOL 239* 224*   < > 208*   HDL 78 56   < > 66   * 126*   < > 126   TRIG 128 211*   < > 80   CHOLHDLRATIO  --   --   --  3.2    < >  = values in this interval not displayed.       No results found for: [id number], [id number], [id number]    I personally reviewed the patient's outside records from [facility] EMR and Care Everywhere. Summary of pertinent findings in HPI.    Impression / Plan     1. MNG   2. Hx of of left thyroidectomy back in the 90s    We reviewed her thyroid US images. We discussed the nature of thyroid nodules, most are benign.  She has 2 small right thyroid nodules.  Superior 1 is very small, subcentimeter size.  Mid thyroid lobe nodule is 1.2 cm in largest dimension, mostly isoechoic.  We reviewed the ELIZABETH recommendation for biopsy.  I would say overall those nodules are low risk for cancer, no indication for biopsy.  I would agree with PCP and radiologist recommendation with 1 year follow-up with repeat thyroid ultrasound.      Test and/or medications prescribed today:  No orders of the defined types were placed in this encounter.        Follow up: MEME Zamorano MD  Endocrinology, Diabetes and Metabolism  [facility]  Answers submitted by the patient for this visit:  Symptoms you have experienced in the last 30 days (Submitted on 9/19/2023)  General Symptoms: No  Skin Symptoms: No  HENT Symptoms: No  EYE SYMPTOMS: No  HEART SYMPTOMS: No  LUNG SYMPTOMS: No  INTESTINAL SYMPTOMS: No  URINARY SYMPTOMS: No  GYNECOLOGIC SYMPTOMS: No  BREAST SYMPTOMS: No  SKELETAL SYMPTOMS: No  BLOOD SYMPTOMS: No  NERVOUS SYSTEM SYMPTOMS: No  MENTAL HEALTH SYMPTOMS: No

## 2023-09-26 NOTE — PROGRESS NOTES
Endocrinology Clinic Visit 9/26/2023      Video-Visit Details    Type of service:  Video Visit    Joined the call at 9/26/2023, 12:35:26 pm.  Left the call at 9/26/2023, 1:06:33 pm.    Originating Location (pt. Location): Home        Distant Location (provider location):  Off-site    Mode of Communication:  Video Conference via [facility]    Physician has received verbal consent for a Video Visit from the patient? Yes    I spent a total of 45 minutes on the date of encounter reviewing medical records, evaluating the patient, coordinating care and documenting in the EHR, as detailed above.      NAME:  Missy Kirkpatrick  PCP:  Shell Osuna  MRN:  [id number]  Reason for Consult: Thyroid nodules  Requesting Provider:  Shell Osuna    Chief Complaint     Chief Complaint   Patient presents with    Consult    Video Visit       History of Present Illness     Missy Kirkpatrick is a 63 year old female who is seen in video visit for thyroid nodule.    She has hx of left partial thyroidectomy 26 years ago. She said at that time her TSH was off, thyroid uptake and scan along with US were done. FNA of nodule was done and she was told it is precancerous. She underwent partial left thyroidectomy since then she had no further thyroid US, TSH has been normal and did not need to take lt4 replacement. She is not sure what was the pathology result of the left lobectomy, but she was never told she has thyroid cancer. She does not remember where the work up and surgery done.    She had thyroid US 4/2023 done per PCP for follow up. She never felt any localized neck symptoms. I reviewed the images with her. 2 small nodules, superior nodule is subcentemetric. Mid right nodule is 1.2 cm by biggest dimension, mostly isoechoic.      She denied any hx of radiation.  She denied any family hx of thyroid cancer. She reported 2 brothers with pancreatic cancer, one with colon cancer, another one with lung cancer.      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     Problem List     Patient Active Problem List   Diagnosis    CARDIOVASCULAR SCREENING; LDL GOAL LESS THAN 160    Female stress incontinence    Factor 5 Leiden mutation, heterozygous (H)    Multiple thyroid nodules    S/P partial thyroidectomy    Advanced directives, counseling/discussion    Right rotator cuff tendonitis    Complete tear of right rotator cuff        Medications     Current Outpatient Medications   Medication    latanoprost (XALATAN) 0.005 % ophthalmic solution    MULTIVITAMIN TABS   OR    topiramate (TOPAMAX) 25 MG tablet    tretinoin (RETIN-A) 0.05 % external cream     No current facility-administered medications for this visit.        Allergies     No Known Allergies    Medical / Surgical History     Past Medical History:   Diagnosis Date    Arthritis     Basal cell carcinoma     Factor 5 Leiden mutation, heterozygous (H)     Factor 5 Leiden mutation, heterozygous (H)     Factor 5 Leiden mutation, heterozygous (H)     Glaucoma (increased eye pressure)     Hyperlipidemia LDL goal < 100     Morbid obesity (H)     Thyroid disease February 1994    Precancerous Thyroid - 1/2 removed     Past Surgical History:   Procedure Laterality Date    ABDOMEN SURGERY  December 2012    Hysterectomy    APPENDECTOMY      BIOPSY  November 2012    utererus    COLONOSCOPY  October 2012    ENT SURGERY  February 1994    Thyroid issue    ORTHOPEDIC SURGERY  01/1976    [place]    TUBAL LIGATION      [facility] ANESTH,CLEFT PALATE REPAIR      [facility] ANESTH,ELBOW,NOS  01/76    [facility] REMOVE BENIGN THYROID LESION  01/2002    nodule removed     [facility] TOTAL ABDOM HYSTERECTOMY  12/14/2012    pathology benign, no further paps needed       Social History     Social History     Socioeconomic History    Marital status:      Spouse name: Not on file    Number of children: Not on file    Years of education: Not on file    Highest education level: Not on file   Occupational History    Not on file    Tobacco Use    Smoking status: Never    Smokeless tobacco: Never    Tobacco comments:     Nonsmoking household   Vaping Use    Vaping Use: Never used   Substance and Sexual Activity    Alcohol use: Yes     Comment: Occasionally    Drug use: No    Sexual activity: Yes     Partners: Male     Birth control/protection: Female Surgical     Comment: tubal ligation   Other Topics Concern    Parent/sibling w/ CABG, MI or angioplasty before 65F 55M? Yes     Comment: Brother   Social History Narrative    Not on file     Social Determinants of Health     Financial Resource Strain: Not on file   Food Insecurity: Not on file   Transportation Needs: Not on file   Physical Activity: Not on file   Stress: Not on file   Social Connections: Not on file   Interpersonal Safety: Not on file   Housing Stability: Not on file       Family History     Family History   Problem Relation Age of Onset    Cerebrovascular Disease Mother     Eye Disorder Mother         glaucoma    Heart Disease Mother     Diabetes Mother         blindness    Hypertension Mother     Eye Surgery Mother         cataracts    Cataracts Mother     Other Cancer Brother         Lung Cancer    Diabetes Brother     Lung Cancer Brother     Blood Disease Brother     Obesity Brother         Bariatric surgery    Blood Disease Brother     Diabetes Brother     Blood Disease Brother     Colon Cancer Brother     Rectal Cancer Brother     Hypertension Brother     Other Cancer Brother         Pancreatic Cancer    Pancreatic Cancer Brother     Blood Disease Brother     Blood Disease Son 27        blood clots    Osteoporosis Son         July 2020    Melanoma No family hx of     Glaucoma No family hx of     Macular Degeneration No family hx of        ROS     12 ROS completed, pertinent positive and negative in HPI    Physical Exam   Wt 99.8 kg (220 lb)   LMP 10/19/2012   BMI 34.46 kg/m     GENERAL: Healthy, alert and no distress  EYES: Eyes grossly normal to inspection.  No  discharge or erythema, or obvious scleral/conjunctival abnormalities.  RESP: No audible wheeze, cough, or visible cyanosis.  No visible retractions or increased work of breathing.    SKIN: Visible skin clear. No significant rash, abnormal pigmentation or lesions.  NEURO: Cranial nerves grossly intact.  Mentation and speech appropriate for age.  PSYCH: Mentation appears normal, affect normal/bright, judgement and insight intact, normal speech and appearance well-groomed.     Labs/Imaging     Pertinent Labs were reviewed and updated in EPIC and discussed briefly.  Radiology Results were  reviewed and updated in EPIC and discussed in details.    Summary of recent findings:   No results found for: A1C    TSH   Date Value Ref Range Status   01/24/2023 0.94 0.40 - 4.00 mU/L Final   01/10/2022 0.60 0.40 - 4.00 mU/L Final   07/23/2020 0.98 0.40 - 4.00 mU/L Final   11/14/2017 0.86 0.40 - 4.00 mU/L Final   09/30/2014 0.61 0.40 - 4.00 mU/L Final     Comment:     Effective 7/30/2014, the reference range for this assay has changed to reflect   new instrumentation/methodology.     11/27/2012 1.31 0.4 - 5.0 mU/L Final   09/30/2010 1.09 0.4 - 5.0 mU/L Final     T4 Free   Date Value Ref Range Status   09/30/2010 1.06 0.70 - 1.85 ng/dL Final       Creatinine   Date Value Ref Range Status   10/09/2009 0.91 0.52 - 1.04 mg/dL Final     Comment:     New IDMS-traceable calibration  beginning 5/1/08       Recent Labs   Lab Test 01/24/23  1000 01/10/22  1645 12/26/18  0940 10/21/15  0823   CHOL 239* 224*   < > 208*   HDL 78 56   < > 66   * 126*   < > 126   TRIG 128 211*   < > 80   CHOLHDLRATIO  --   --   --  3.2    < > = values in this interval not displayed.       No results found for: [id number], [id number], [id number]    I personally reviewed the patient's outside records from [facility] EMR and Care Everywhere. Summary of pertinent findings in HPI.    Impression / Plan     1. MNG   2. Hx of of left thyroidectomy back in the 90s    We  reviewed her thyroid US images. We discussed the nature of thyroid nodules, most are benign.  She has 2 small right thyroid nodules.  Superior 1 is very small, subcentimeter size.  Mid thyroid lobe nodule is 1.2 cm in largest dimension, mostly isoechoic.  We reviewed the ELIZABETH recommendation for biopsy.  I would say overall those nodules are low risk for cancer, no indication for biopsy.  I would agree with PCP and radiologist recommendation with 1 year follow-up with repeat thyroid ultrasound.      Test and/or medications prescribed today:  No orders of the defined types were placed in this encounter.        Follow up: MEME Zamorano MD  Endocrinology, Diabetes and Metabolism  [facility]  Answers submitted by the patient for this visit:  Symptoms you have experienced in the last 30 days (Submitted on 9/19/2023)  General Symptoms: No  Skin Symptoms: No  HENT Symptoms: No  EYE SYMPTOMS: No  HEART SYMPTOMS: No  LUNG SYMPTOMS: No  INTESTINAL SYMPTOMS: No  URINARY SYMPTOMS: No  GYNECOLOGIC SYMPTOMS: No  BREAST SYMPTOMS: No  SKELETAL SYMPTOMS: No  BLOOD SYMPTOMS: No  NERVOUS SYSTEM SYMPTOMS: No  MENTAL HEALTH SYMPTOMS: No

## 2023-09-26 NOTE — NURSING NOTE
Is the patient currently in the state of [place]? YES    Visit mode:VIDEO    If the visit is dropped, the patient can be reconnected by: VIDEO VISIT: Text to cell phone:   Telephone Information:   Mobile [phone]       Will anyone else be joining the visit? NO  (If patient encounters technical issues they should call [phone]    How would you like to obtain your AVS? MyChart    Are changes needed to the allergy or medication list? No    Reason for visit: Consult and Video Visit    Netta GOMEZ

## 2023-11-10 ENCOUNTER — OFFICE VISIT (OUTPATIENT)
Dept: OPHTHALMOLOGY | Facility: CLINIC | Age: 63
End: 2023-11-10
Payer: COMMERCIAL

## 2023-11-10 DIAGNOSIS — H52.4 MYOPIA OF BOTH EYES WITH ASTIGMATISM AND PRESBYOPIA: ICD-10-CM

## 2023-11-10 DIAGNOSIS — H52.13 MYOPIA OF BOTH EYES WITH ASTIGMATISM AND PRESBYOPIA: ICD-10-CM

## 2023-11-10 DIAGNOSIS — H40.003 GLAUCOMA SUSPECT OF BOTH EYES: Primary | ICD-10-CM

## 2023-11-10 DIAGNOSIS — H52.203 MYOPIA OF BOTH EYES WITH ASTIGMATISM AND PRESBYOPIA: ICD-10-CM

## 2023-11-10 PROCEDURE — 92015 DETERMINE REFRACTIVE STATE: CPT | Performed by: STUDENT IN AN ORGANIZED HEALTH CARE EDUCATION/TRAINING PROGRAM

## 2023-11-10 PROCEDURE — 92014 COMPRE OPH EXAM EST PT 1/>: CPT | Performed by: STUDENT IN AN ORGANIZED HEALTH CARE EDUCATION/TRAINING PROGRAM

## 2023-11-10 ASSESSMENT — CONF VISUAL FIELD
OS_INFERIOR_TEMPORAL_RESTRICTION: 0
OD_NORMAL: 1
OD_INFERIOR_TEMPORAL_RESTRICTION: 0
OD_SUPERIOR_TEMPORAL_RESTRICTION: 0
OD_SUPERIOR_NASAL_RESTRICTION: 0
OD_INFERIOR_NASAL_RESTRICTION: 0
OS_INFERIOR_NASAL_RESTRICTION: 0
OS_NORMAL: 1
OS_SUPERIOR_TEMPORAL_RESTRICTION: 0
OS_SUPERIOR_NASAL_RESTRICTION: 0

## 2023-11-10 ASSESSMENT — CUP TO DISC RATIO
OD_RATIO: 0.4
OS_RATIO: 0.45

## 2023-11-10 ASSESSMENT — TONOMETRY
OD_IOP_MMHG: 17
OS_IOP_MMHG: 16
IOP_METHOD: APPLANATION

## 2023-11-10 ASSESSMENT — REFRACTION_MANIFEST
OD_CYLINDER: +1.00
OS_CYLINDER: +1.25
OS_SPHERE: -1.00
OS_ADD: +2.25
OD_SPHERE: -1.25
OS_AXIS: 046
OD_AXIS: 011
OD_ADD: +2.25

## 2023-11-10 ASSESSMENT — VISUAL ACUITY
OD_SC+: -2
METHOD: SNELLEN - LINEAR
OS_SC: 20/30
OD_SC: 20/30
OS_SC+: -2

## 2023-11-10 ASSESSMENT — EXTERNAL EXAM - LEFT EYE: OS_EXAM: NORMAL

## 2023-11-10 ASSESSMENT — SLIT LAMP EXAM - LIDS
COMMENTS: NORMAL
COMMENTS: NORMAL

## 2023-11-10 ASSESSMENT — EXTERNAL EXAM - RIGHT EYE: OD_EXAM: NORMAL

## 2023-11-10 NOTE — LETTER
11/10/2023         RE: Missy Kirkpatrick  [address] [facility] [address]        Dear Colleague,    Thank you for referring your patient, Missy Kirkpatrick, to the [facility]. Please see a copy of my visit note below.     Current Eye Medications:  Latanoprost at bedtime both eyes     Subjective:  here for complete eye exam today. Getting harder to see the pickleball recently. Used to wear glasses for distance with astigmatism.    just had knee replacement.      Objective:  See Ophthalmology Exam.       Assessment:  Missy Kirkpatrick is a 63 year old female who presents with:   Encounter Diagnoses   Name Primary?     Glaucoma suspect of both eyes - on drops Intraocular pressure 17/16 today. Continue same medication.     Myopia of both eyes with astigmatism and presbyopia        Plan:  Continue Latanoprost (green top) every evening both eyes     Glasses prescription given    Araceli Martinez MD  [phone]       Again, thank you for allowing me to participate in the care of your patient.        Sincerely,        Araceli Martinez MD

## 2023-11-10 NOTE — PATIENT INSTRUCTIONS
Continue Latanoprost (green top) every evening both eyes     Glasses prescription given    Araceli Martinez MD  [phone]

## 2023-11-10 NOTE — PROGRESS NOTES
Current Eye Medications:  Latanoprost at bedtime both eyes     Subjective:  here for complete eye exam today. Getting harder to see the pickleball recently. Used to wear glasses for distance with astigmatism.    just had knee replacement.      Objective:  See Ophthalmology Exam.       Assessment:  Missy Kirkpatrick is a 63 year old female who presents with:   Encounter Diagnoses   Name Primary?    Glaucoma suspect of both eyes - on drops Intraocular pressure 17/16 today. Continue same medication.    Myopia of both eyes with astigmatism and presbyopia        Plan:  Continue Latanoprost (green top) every evening both eyes     Glasses prescription given    Araceli Martinez MD  [phone]

## 2023-12-01 DIAGNOSIS — L98.8 AGE-RELATED FACIAL WRINKLES: ICD-10-CM

## 2023-12-01 RX ORDER — TRETINOIN 0.5 MG/G
CREAM TOPICAL
Qty: 45 G | Refills: 3 | Status: SHIPPED | OUTPATIENT
Start: 2023-12-01

## 2023-12-01 NOTE — TELEPHONE ENCOUNTER
Requested Prescriptions   Pending Prescriptions Disp Refills    tretinoin (RETIN-A) 0.05 % external cream 45 g 6     Sig: Spread a pea size amount into affected area topically at bedtime.  Use sunscreen SPF>20.       There is no refill protocol information for this order          Last office visit: 10/31/2022 ; last virtual visit: Visit date not found with prescribing provider:  Anisa Hsu      Future Office Visit:

## 2023-12-01 NOTE — TELEPHONE ENCOUNTER
Medication is being filled for 1 time refill only due to:  Patient needs to be seen because it has been more than one year since last visit. Message sent to make appt for further refills.  Nunu FLYNN RN BSN PHN  Specialty Clinics

## 2024-01-04 ENCOUNTER — PATIENT OUTREACH (OUTPATIENT)
Dept: CARE COORDINATION | Facility: CLINIC | Age: 64
End: 2024-01-04
Payer: COMMERCIAL

## 2024-01-18 ENCOUNTER — PATIENT OUTREACH (OUTPATIENT)
Dept: CARE COORDINATION | Facility: CLINIC | Age: 64
End: 2024-01-18
Payer: COMMERCIAL

## 2024-01-29 DIAGNOSIS — H52.4 PRESBYOPIA OF BOTH EYES: Primary | ICD-10-CM

## 2024-01-29 RX ORDER — PILOCARPINE HYDROCHLORIDE 12.5 MG/ML
1 SOLUTION/ DROPS OPHTHALMIC DAILY
Qty: 5 ML | Refills: 11 | Status: SHIPPED | OUTPATIENT
Start: 2024-01-29

## 2024-01-30 ENCOUNTER — TELEPHONE (OUTPATIENT)
Dept: OPHTHALMOLOGY | Facility: CLINIC | Age: 64
End: 2024-01-30
Payer: COMMERCIAL

## 2024-01-30 NOTE — TELEPHONE ENCOUNTER
Received a fax from [facility] pharmacy stating Vuity 1.25% is not covered.  They are wondering if Pilocarpine 1% could be substituted.  Dr. Martinez does not approve a substitution.    I called [facility], and left a voicemail telling them a substitution is not approved, and if the patient would like the original medicine prescribed, she would need to pay out of pocket.

## 2024-04-07 ENCOUNTER — HEALTH MAINTENANCE LETTER (OUTPATIENT)
Age: 64
End: 2024-04-07

## 2024-05-22 ENCOUNTER — OFFICE VISIT (OUTPATIENT)
Dept: OPHTHALMOLOGY | Facility: CLINIC | Age: 64
End: 2024-05-22
Payer: COMMERCIAL

## 2024-05-22 DIAGNOSIS — H40.003 GLAUCOMA SUSPECT OF BOTH EYES: Primary | ICD-10-CM

## 2024-05-22 PROCEDURE — 92012 INTRM OPH EXAM EST PATIENT: CPT | Performed by: STUDENT IN AN ORGANIZED HEALTH CARE EDUCATION/TRAINING PROGRAM

## 2024-05-22 PROCEDURE — 92133 CPTRZD OPH DX IMG PST SGM ON: CPT | Performed by: STUDENT IN AN ORGANIZED HEALTH CARE EDUCATION/TRAINING PROGRAM

## 2024-05-22 PROCEDURE — 92083 EXTENDED VISUAL FIELD XM: CPT | Performed by: STUDENT IN AN ORGANIZED HEALTH CARE EDUCATION/TRAINING PROGRAM

## 2024-05-22 ASSESSMENT — TONOMETRY
IOP_METHOD: APPLANATION
OS_IOP_MMHG: 14
OD_IOP_MMHG: 16

## 2024-05-22 ASSESSMENT — VISUAL ACUITY
METHOD: SNELLEN - LINEAR
OS_SC+: -2
OD_SC: 20/30
OS_SC: 20/30

## 2024-05-22 ASSESSMENT — EXTERNAL EXAM - LEFT EYE: OS_EXAM: NORMAL

## 2024-05-22 ASSESSMENT — SLIT LAMP EXAM - LIDS
COMMENTS: NORMAL
COMMENTS: NORMAL

## 2024-05-22 ASSESSMENT — EXTERNAL EXAM - RIGHT EYE: OD_EXAM: NORMAL

## 2024-05-22 NOTE — PROGRESS NOTES
Current Eye Medications:  Latanoprost at bedtime both eyes      Subjective:  Patient here today for glaucoma follow up. OCT & HVF done today.  Vision is stable, no changes.      Objective:  See Ophthalmology Exam.       Assessment:  Missy Kirkpatrick is a 63 year old female who presents with:   Encounter Diagnosis   Name Primary?    Glaucoma suspect of both eyes - on drops Intraocular pressure 16/14 today. Continue same medication.    OCT optic nerve: avg retinal nerve fiber layer 55/62; thin S,T,I and borderline N - stable right eye; thin S,T,I and stable left eye     Sampson visual field (HVF) 24-2: very mild scattered loss both eyes        Plan:  Continue Latanoprost (green top) every evening both eyes     Araceli Martinez MD  [phone]

## 2024-05-22 NOTE — PATIENT INSTRUCTIONS
Continue Latanoprost (green top) every evening both eyes     Araceli Martinez MD  [phone]

## 2024-05-22 NOTE — LETTER
5/22/2024         RE: Missy Kirkpatrick  [address] [facility] [address]        Dear Colleague,    Thank you for referring your patient, Missy Kirkpatrick, to the [facility]. Please see a copy of my visit note below.     Current Eye Medications:  Latanoprost at bedtime both eyes      Subjective:  Patient here today for glaucoma follow up. OCT & HVF done today.  Vision is stable, no changes.      Objective:  See Ophthalmology Exam.       Assessment:  Missy Kirkpatrick is a 63 year old female who presents with:   Encounter Diagnosis   Name Primary?     Glaucoma suspect of both eyes - on drops Intraocular pressure 16/14 today. Continue same medication.    OCT optic nerve: avg retinal nerve fiber layer 55/62; thin S,T,I and borderline N - stable right eye; thin S,T,I and stable left eye     Sampson visual field (HVF) 24-2: very mild scattered loss both eyes        Plan:  Continue Latanoprost (green top) every evening both eyes     Araceli Martinez MD  [phone]      Again, thank you for allowing me to participate in the care of your patient.        Sincerely,        Araceli Martinez MD

## 2024-05-23 ENCOUNTER — OFFICE VISIT (OUTPATIENT)
Dept: DERMATOLOGY | Facility: CLINIC | Age: 64
End: 2024-05-23
Payer: COMMERCIAL

## 2024-05-23 DIAGNOSIS — D18.01 ANGIOMA OF SKIN: ICD-10-CM

## 2024-05-23 DIAGNOSIS — D48.5 NEOPLASM OF UNCERTAIN BEHAVIOR OF SKIN: ICD-10-CM

## 2024-05-23 DIAGNOSIS — D22.9 NEVUS: Primary | ICD-10-CM

## 2024-05-23 DIAGNOSIS — Z85.828 HISTORY OF BASAL CELL CARCINOMA (BCC): ICD-10-CM

## 2024-05-23 DIAGNOSIS — L82.1 SEBORRHEIC KERATOSIS: ICD-10-CM

## 2024-05-23 DIAGNOSIS — L81.4 LENTIGO: ICD-10-CM

## 2024-05-23 PROCEDURE — 11103 TANGNTL BX SKIN EA SEP/ADDL: CPT | Performed by: PHYSICIAN ASSISTANT

## 2024-05-23 PROCEDURE — 88341 IMHCHEM/IMCYTCHM EA ADD ANTB: CPT | Performed by: PATHOLOGY

## 2024-05-23 PROCEDURE — 99213 OFFICE O/P EST LOW 20 MIN: CPT | Mod: 25 | Performed by: PHYSICIAN ASSISTANT

## 2024-05-23 PROCEDURE — 88305 TISSUE EXAM BY PATHOLOGIST: CPT | Performed by: PATHOLOGY

## 2024-05-23 PROCEDURE — 88342 IMHCHEM/IMCYTCHM 1ST ANTB: CPT | Performed by: PATHOLOGY

## 2024-05-23 PROCEDURE — 11102 TANGNTL BX SKIN SINGLE LES: CPT | Performed by: PHYSICIAN ASSISTANT

## 2024-05-23 NOTE — PROGRESS NOTES
HPI:   Chief complaints: Missy Kirkpatrick is a 63 year old female who presents for Full skin cancer screening to rule out skin cancer   Last Skin Exam: 1.5 years ago    1st Baseline: no  Personal HX of Skin Cancer: Yes BCC on the upper back 3/2019  Personal HX of Malignant Melanoma: no   Family HX of Skin Cancer / Malignant Melanoma: no  Personal HX of Atypical Moles:   no  Risk factors: history of frequent burns, tans and sun exposure. Still has frequent sun exposure  New / Changing lesions: yes new mole on the left thigh  Social History: Has 8 grandchildren now and all live close to her.   On review of systems, there are no further skin complaints, patient is feeling otherwise well.  See patient intake sheet.  ROS of the following were done and are negative: Constitutional, Eyes, Ears, Nose,   Mouth, Throat, Cardiovascular, Respiratory, GI, Genitourinary, Musculoskeletal,   Psychiatric, Endocrine, Allergic/Immunologic.    PHYSICAL EXAM:   LMP 10/19/2012   Skin exam performed as follows: Type 2 skin. Mood appropriate  Alert and Oriented X 3. Well developed, well nourished in no distress.  General appearance: Normal  Head including face: Normal  Eyes: conjunctiva and lids: Normal  Mouth: Lips, teeth, gums: Normal  Neck: Normal  Chest-breast/axillae: Normal  Back: Normal  Spleen and liver: Normal  Cardiovascular: Exam of peripheral vascular system by observation for swelling, varicosities, edema: Normal  Genitalia: groin, buttocks: Normal  Extremities: digits/nails (clubbing): Normal  Eccrine and Apocrine glands: Normal  Right upper extremity: Normal  Left upper extremity: Normal  Right lower extremity: Normal  Left lower extremity: Normal  Skin: Scalp and body hair: See below    Pt deferred exam of breasts, groin, buttocks: No    Other physical findings:  1. Multiple pigmented macules on extremities and trunk  2. Multiple pigmented macules on face, trunk and extremities  3. Multiple vascular papules on trunk,  arms and legs  4. Multiple scattered keratotic plaques  5. 4 mm black macule on the left thigh  6. 3 mm brown macule on the left medial shin       Except as noted above, no other signs of skin cancer or melanoma.     ASSESSMENT/PLAN:   Benign Full skin cancer screening today. . Patient with history of BCC  Advised on monthly self exams and 1 year  Patient Education: Appropriate brochures given.    Multiple benign appearing nevi on arms, legs and trunk. Discussed ABCDEs of melanoma and sunscreen.   Multiple lentigos on arms, legs and trunk. Advised benign, no treatment needed.  Multiple scattered angiomas. Advised benign, no treatment needed.   Seborrheic keratosis on arms, legs and trunk. Advised benign, no treatment needed.  R/o melanoma vs atypical nevus on the left thigh, left medial shin. Shave biopsy performed.  Area cleaned and anesthetized with 1% lidocaine with epinephrine.  Dermablade used to remove the lesion and sent to pathology. Bleeding was cauterized. Pt tolerated procedure well with no complications.                 Follow-up: yearly FSE/PRN sooner    1.) Patient was asked about new and changing moles. YES  2.) Patient received a complete physical skin examination: YES  3.) Patient was counseled to perform a monthly self skin examination: YES  Scribed By: Brandy Hernandez MS, PA-C

## 2024-05-23 NOTE — PATIENT INSTRUCTIONS
Wound Care Instructions     FOR SUPERFICIAL WOUNDS     [facility]  [phone]                 AFTER 24 HOURS YOU SHOULD REMOVE THE BANDAGE AND BEGIN DAILY DRESSING CHANGES AS FOLLOWS:     1) Remove Dressing.     2) Clean and dry the area with tap water using a Q-tip or sterile gauze pad.     3) Apply Vaseline, Aquaphor, Polysporin ointment or Bacitracin ointment over entire wound.  Do NOT use Neosporin ointment.     4) Cover the wound with a band-aid, or a sterile non-stick gauze pad and micropore paper tape    REPEAT THESE INSTRUCTIONS AT LEAST ONCE A DAY UNTIL THE WOUND HAS COMPLETELY HEALED.    It is an old wives tale that a wound heals better when it is exposed to air and allowed to dry out. The wound will heal faster with a better cosmetic result if it is kept moist with ointment and covered with a bandage.    **Do not let the wound dry out.**    Supplies Needed:      *Cotton tipped applicators (Q-tips)    *Vaseline, Aquaphor, Polysporin or Bacitracin Ointment (NOT NEOSPORIN)    *Band-aids or non-stick gauze pads and micropore paper tape.      PATIENT INFORMATION:    During the healing process you will notice a number of changes. All wounds develop a small halo of redness surrounding the wound.  This means healing is occurring. Severe itching with extensive redness usually indicates sensitivity to the ointment or bandage tape used to dress the wound.  You should call our office if this develops.      Swelling  and/or discoloration around your surgical site is common, particularly when performed around the eye.    All wounds normally drain.  The larger the wound the more drainage there will be.  After 7-10 days, you will notice the wound beginning to shrink and new skin will begin to grow.  The wound is healed when you can see skin has formed over the entire area.  A healed wound has a healthy, shiny look to the surface and is red to dark pink in color to normalize.  Wounds may  take approximately 4-6 weeks to heal.  Larger wounds may take 6-8 weeks.  After the wound is healed you may discontinue dressing changes.    You may experience a sensation of tightness as your wound heals. This is normal and will gradually subside.    Your healed wound may be sensitive to temperature changes. This sensitivity improves with time, but if you re having a lot of discomfort, try to avoid temperature extremes.    Patients frequently experience itching after their wound appears to have healed because of the continue healing under the skin.  Plain Vaseline will help relieve the itching.      POSSIBLE COMPLICATIONS    BLEEDING:    Leave the bandage in place.  Use tightly rolled up gauze or a cloth to apply direct pressure over the bandage for 30  minutes.  Reapply pressure for an additional 30 minutes if necessary  Use additional gauze and tape to maintain pressure once the bleeding has stopped.

## 2024-05-23 NOTE — LETTER
5/23/2024         RE: Missy Kirkpatrick  [address]  [facility] [address]        Dear Colleague,    Thank you for referring your patient, Missy Kirkpatrick, to the [facility]. Please see a copy of my visit note below.    HPI:   Chief complaints: Missy Kirkpatrick is a 63 year old female who presents for Full skin cancer screening to rule out skin cancer   Last Skin Exam: 1.5 years ago    1st Baseline: no  Personal HX of Skin Cancer: Yes BCC on the upper back 3/2019  Personal HX of Malignant Melanoma: no   Family HX of Skin Cancer / Malignant Melanoma: no  Personal HX of Atypical Moles:   no  Risk factors: history of frequent burns, tans and sun exposure. Still has frequent sun exposure  New / Changing lesions: yes new mole on the left thigh  Social History: Has 8 grandchildren now and all live close to her.   On review of systems, there are no further skin complaints, patient is feeling otherwise well.  See patient intake sheet.  ROS of the following were done and are negative: Constitutional, Eyes, Ears, Nose,   Mouth, Throat, Cardiovascular, Respiratory, GI, Genitourinary, Musculoskeletal,   Psychiatric, Endocrine, Allergic/Immunologic.    PHYSICAL EXAM:   LMP 10/19/2012   Skin exam performed as follows: Type 2 skin. Mood appropriate  Alert and Oriented X 3. Well developed, well nourished in no distress.  General appearance: Normal  Head including face: Normal  Eyes: conjunctiva and lids: Normal  Mouth: Lips, teeth, gums: Normal  Neck: Normal  Chest-breast/axillae: Normal  Back: Normal  Spleen and liver: Normal  Cardiovascular: Exam of peripheral vascular system by observation for swelling, varicosities, edema: Normal  Genitalia: groin, buttocks: Normal  Extremities: digits/nails (clubbing): Normal  Eccrine and Apocrine glands: Normal  Right upper extremity: Normal  Left upper extremity: Normal  Right lower extremity: Normal  Left lower extremity: Normal  Skin: Scalp and  body hair: See below    Pt deferred exam of breasts, groin, buttocks: No    Other physical findings:  1. Multiple pigmented macules on extremities and trunk  2. Multiple pigmented macules on face, trunk and extremities  3. Multiple vascular papules on trunk, arms and legs  4. Multiple scattered keratotic plaques  5. 4 mm black macule on the left thigh  6. 3 mm brown macule on the left medial shin       Except as noted above, no other signs of skin cancer or melanoma.     ASSESSMENT/PLAN:   Benign Full skin cancer screening today. . Patient with history of BCC  Advised on monthly self exams and 1 year  Patient Education: Appropriate brochures given.    Multiple benign appearing nevi on arms, legs and trunk. Discussed ABCDEs of melanoma and sunscreen.   Multiple lentigos on arms, legs and trunk. Advised benign, no treatment needed.  Multiple scattered angiomas. Advised benign, no treatment needed.   Seborrheic keratosis on arms, legs and trunk. Advised benign, no treatment needed.  R/o melanoma vs atypical nevus on the left thigh, left medial shin. Shave biopsy performed.  Area cleaned and anesthetized with 1% lidocaine with epinephrine.  Dermablade used to remove the lesion and sent to pathology. Bleeding was cauterized. Pt tolerated procedure well with no complications.                 Follow-up: yearly FSE/PRN sooner    1.) Patient was asked about new and changing moles. YES  2.) Patient received a complete physical skin examination: YES  3.) Patient was counseled to perform a monthly self skin examination: YES  Scribed By: Brandy Hernandez MS, RIYA        Again, thank you for allowing me to participate in the care of your patient.        Sincerely,        Brandy Hernandez PA-C

## 2024-05-27 SDOH — HEALTH STABILITY: PHYSICAL HEALTH: ON AVERAGE, HOW MANY MINUTES DO YOU ENGAGE IN EXERCISE AT THIS LEVEL?: 70 MIN

## 2024-05-27 SDOH — HEALTH STABILITY: PHYSICAL HEALTH: ON AVERAGE, HOW MANY DAYS PER WEEK DO YOU ENGAGE IN MODERATE TO STRENUOUS EXERCISE (LIKE A BRISK WALK)?: 5 DAYS

## 2024-05-27 ASSESSMENT — SOCIAL DETERMINANTS OF HEALTH (SDOH): HOW OFTEN DO YOU GET TOGETHER WITH FRIENDS OR RELATIVES?: MORE THAN THREE TIMES A WEEK

## 2024-05-28 ENCOUNTER — OFFICE VISIT (OUTPATIENT)
Dept: FAMILY MEDICINE | Facility: CLINIC | Age: 64
End: 2024-05-28
Payer: COMMERCIAL

## 2024-05-28 VITALS
HEART RATE: 86 BPM | RESPIRATION RATE: 14 BRPM | WEIGHT: 229 LBS | OXYGEN SATURATION: 96 % | DIASTOLIC BLOOD PRESSURE: 80 MMHG | HEIGHT: 67 IN | BODY MASS INDEX: 35.94 KG/M2 | TEMPERATURE: 97.9 F | SYSTOLIC BLOOD PRESSURE: 118 MMHG

## 2024-05-28 DIAGNOSIS — Z00.00 ROUTINE GENERAL MEDICAL EXAMINATION AT A HEALTH CARE FACILITY: Primary | ICD-10-CM

## 2024-05-28 DIAGNOSIS — Z13.6 CARDIOVASCULAR SCREENING; LDL GOAL LESS THAN 160: ICD-10-CM

## 2024-05-28 DIAGNOSIS — E89.0 S/P PARTIAL THYROIDECTOMY: ICD-10-CM

## 2024-05-28 DIAGNOSIS — E04.2 MULTIPLE THYROID NODULES: ICD-10-CM

## 2024-05-28 DIAGNOSIS — N95.2 VAGINAL ATROPHY: ICD-10-CM

## 2024-05-28 DIAGNOSIS — D68.51 FACTOR 5 LEIDEN MUTATION, HETEROZYGOUS (H): ICD-10-CM

## 2024-05-28 DIAGNOSIS — N89.8 VAGINAL DISCHARGE: ICD-10-CM

## 2024-05-28 LAB
CLUE CELLS: ABNORMAL
TRICHOMONAS, WET PREP: ABNORMAL
WBC'S/HIGH POWER FIELD, WET PREP: ABNORMAL
YEAST, WET PREP: ABNORMAL

## 2024-05-28 PROCEDURE — 99213 OFFICE O/P EST LOW 20 MIN: CPT | Mod: 25 | Performed by: FAMILY MEDICINE

## 2024-05-28 PROCEDURE — 99396 PREV VISIT EST AGE 40-64: CPT | Performed by: FAMILY MEDICINE

## 2024-05-28 PROCEDURE — 87210 SMEAR WET MOUNT SALINE/INK: CPT | Performed by: FAMILY MEDICINE

## 2024-05-28 PROCEDURE — 80061 LIPID PANEL: CPT | Performed by: FAMILY MEDICINE

## 2024-05-28 PROCEDURE — 84443 ASSAY THYROID STIM HORMONE: CPT | Performed by: FAMILY MEDICINE

## 2024-05-28 PROCEDURE — 36415 COLL VENOUS BLD VENIPUNCTURE: CPT | Performed by: FAMILY MEDICINE

## 2024-05-28 PROCEDURE — 82947 ASSAY GLUCOSE BLOOD QUANT: CPT | Performed by: FAMILY MEDICINE

## 2024-05-28 RX ORDER — ESTRADIOL 0.1 MG/G
CREAM VAGINAL
Qty: 42.5 G | Refills: 11 | Status: SHIPPED | OUTPATIENT
Start: 2024-05-28 | End: 2024-09-09

## 2024-05-28 ASSESSMENT — PAIN SCALES - GENERAL: PAINLEVEL: NO PAIN (0)

## 2024-05-28 NOTE — PATIENT INSTRUCTIONS
"Preventive Care Advice   This is general advice we often give to help people stay healthy. Your care team may have specific advice just for you. Please talk to your care team about your own preventive care needs.  Lifestyle  Exercise at least 150 minutes each week (30 minutes a day, 5 days a week).  Do muscle strengthening activities 2 days a week. These help control your weight and prevent disease.  No smoking.  Wear sunscreen to prevent skin cancer.  Have your home tested for radon every 2 to 5 years. Radon is a colorless, odorless gas that can harm your lungs. To learn more, go to www.health.[facility].mn. and search for \"Radon in Homes.\"  Keep guns unloaded and locked up in a safe place like a safe or gun vault, or, use a gun lock and hide the keys. Always lock away bullets separately. To learn more, visit [place]where.[place].gov and search for \"safe gun storage.\"  Nutrition  Eat 5 or more servings of fruits and vegetables each day.  Try wheat bread, brown rice and whole grain pasta (instead of white bread, rice, and pasta).  Get enough calcium and vitamin D. Check the label on foods and aim for 100% of the RDA (recommended daily allowance).  Regular exams  Have a dental exam and cleaning every 6 months.  See your health care team every year to talk about:  Any changes in your health.  Any medicines your care team has prescribed.  Preventive care, family planning, and ways to prevent chronic diseases.  Shots (vaccines)   HPV shots (up to age 26), if you've never had them before.  Hepatitis B shots (up to age 59), if you've never had them before.  COVID-19 shot: Get this shot when it's due.  Flu shot: Get a flu shot every year.  Tetanus shot: Get a tetanus shot every 10 years.  Pneumococcal, hepatitis A, and RSV shots: Ask your care team if you need these based on your risk.  Shingles shot (for age 50 and up).  General health tests  Diabetes screening:  Starting at age 35, Get screened for diabetes at least every 3 years.  If " you are younger than age 35, ask your care team if you should be screened for diabetes.  Cholesterol test: At age 39, start having a cholesterol test every 5 years, or more often if advised.  Bone density scan (DEXA): At age 50, ask your care team if you should have this scan for osteoporosis (brittle bones).  Hepatitis C: Get tested at least once in your life.  Abdominal aortic aneurysm screening: Talk to your doctor about having this screening if you:  Have ever smoked; and  Are biologically male; and  Are between the ages of 65 and 75.  STIs (sexually transmitted infections)  Before age 24: Ask your care team if you should be screened for STIs.  After age 24: Get screened for STIs if you're at risk. You are at risk for STIs (including HIV) if:  You are sexually active with more than one person.  You don't use condoms every time.  You or a partner was diagnosed with a sexually transmitted infection.  If you are at risk for HIV, ask about PrEP medicine to prevent HIV.  Get tested for HIV at least once in your life, whether you are at risk for HIV or not.  Cancer screening tests  Cervical cancer screening: If you have a cervix, begin getting regular cervical cancer screening tests at age 21. Most people who have regular screenings with normal results can stop after age 65. Talk about this with your provider.  Breast cancer scan (mammogram): If you've ever had breasts, begin having regular mammograms starting at age 40. This is a scan to check for breast cancer.  Colon cancer screening: It is important to start screening for colon cancer at age 45.  Have a colonoscopy test every 10 years (or more often if you're at risk) Or, ask your provider about stool tests like a FIT test every year or Cologuard test every 3 years.  To learn more about your testing options, visit: www.U[employer]/648568.pdf.  For help making a decision, visit: john/xw82256.  Prostate cancer screening test: If you have a prostate and are age 55  to 69, ask your provider if you would benefit from a yearly prostate cancer screening test.  Lung cancer screening: If you are a current or former smoker age 50 to 80, ask your care team if ongoing lung cancer screenings are right for you.  For informational purposes only. Not to replace the advice of your health care provider. Copyright   2023 [employer]. All rights reserved. Clinically reviewed by the [facility]. [employer] 522677 - REV 04/24.    Learning About Stress  What is stress?     Stress is your body's response to a hard situation. Your body can have a physical, emotional, or mental response. Stress is a fact of life for most people, and it affects everyone differently. What causes stress for you may not be stressful for someone else.  A lot of things can cause stress. You may feel stress when you go on a job interview, take a test, or run a race. This kind of short-term stress is normal and even useful. It can help you if you need to work hard or react quickly. For example, stress can help you finish an important job on time.  Long-term stress is caused by ongoing stressful situations or events. Examples of long-term stress include long-term health problems, ongoing problems at work, or conflicts in your family. Long-term stress can harm your health.  How does stress affect your health?  When you are stressed, your body responds as though you are in danger. It makes hormones that speed up your heart, make you breathe faster, and give you a burst of energy. This is called the fight-or-flight stress response. If the stress is over quickly, your body goes back to normal and no harm is done.  But if stress happens too often or lasts too long, it can have bad effects. Long-term stress can make you more likely to get sick, and it can make symptoms of some diseases worse. If you tense up when you are stressed, you may develop neck, shoulder, or low back pain. Stress is  linked to high blood pressure and heart disease.  Stress also harms your emotional health. It can make you nam, tense, or depressed. Your relationships may suffer, and you may not do well at work or school.  What can you do to manage stress?  You can try these things to help manage stress:   Do something active. Exercise or activity can help reduce stress. Walking is a great way to get started. Even everyday activities such as housecleaning or yard work can help.  Try yoga or gisell chi. These techniques combine exercise and meditation. You may need some training at first to learn them.  Do something you enjoy. For example, listen to music or go to a movie. Practice your hobby or do volunteer work.  Meditate. This can help you relax, because you are not worrying about what happened before or what may happen in the future.  Do guided imagery. Imagine yourself in any setting that helps you feel calm. You can use online videos, books, or a teacher to guide you.  Do breathing exercises. For example:  From a standing position, bend forward from the waist with your knees slightly bent. Let your arms dangle close to the floor.  Breathe in slowly and deeply as you return to a standing position. Roll up slowly and lift your head last.  Hold your breath for just a few seconds in the standing position.  Breathe out slowly and bend forward from the waist.  Let your feelings out. Talk, laugh, cry, and express anger when you need to. Talking with supportive friends or family, a counselor, or a zahraa leader about your feelings is a healthy way to relieve stress. Avoid discussing your feelings with people who make you feel worse.  Write. It may help to write about things that are bothering you. This helps you find out how much stress you feel and what is causing it. When you know this, you can find better ways to cope.  What can you do to prevent stress?  You might try some of these things to help prevent stress:  Manage your time.  "This helps you find time to do the things you want and need to do.  Get enough sleep. Your body recovers from the stresses of the day while you are sleeping.  Get support. Your family, friends, and community can make a difference in how you experience stress.  Limit your news feed. Avoid or limit time on social media or news that may make you feel stressed.  Do something active. Exercise or activity can help reduce stress. Walking is a great way to get started.  Where can you learn more?  Go to https://www.[employer].net/patiented  Enter N032 in the search box to learn more about \"Learning About Stress.\"  Current as of: October 24, 2023               Content Version: 14.0    1062-1245 [employer].   Care instructions adapted under license by your healthcare professional. If you have questions about a medical condition or this instruction, always ask your healthcare professional. [employer] disclaims any warranty or liability for your use of this information.      "

## 2024-05-28 NOTE — PROGRESS NOTES
"Preventive Care Visit  [facility]  Shell Osuna MD, Family Medicine  May 28, 2024      Assessment & Plan     (Z00.00) Routine general medical examination at a health care facility  (primary encounter diagnosis)  Comment: preventive needs reviewed   Plan: Glucose        see orders in Epic.     (N89.8) Vaginal discharge  (N95.2) Vaginal atrophy  Comment: suspect vag atrophy with minor bleeding from sex  Plan: Wet prep - lab collect        estradiol (ESTRACE) 0.1 MG/GM vaginal cream        Wet prep neg  Trial of vag estrogen     (E04.2) Multiple thyroid nodules  (E89.0) S/P partial thyroidectomy  Comment: due for follow-up US  Plan: US Thyroid        TSH WITH FREE T4 REFLEX        TSH today, replace if needed    (D68.51) Factor 5 Leiden mutation, heterozygous (H24)  Comment: no clots  Plan: continue to monitor    (Z13.6) CARDIOVASCULAR SCREENING; LDL GOAL LESS THAN 160  Comment: due  Plan: Lipid panel reflex to direct LDL Non-fasting                    BMI  Estimated body mass index is 35.87 kg/m  as calculated from the following:    Height as of this encounter: 1.702 m (5' 7\").    Weight as of this encounter: 103.9 kg (229 lb).   Weight management plan: Discussed healthy diet and exercise guidelines    Counseling  Appropriate preventive services were discussed with this patient, including applicable screening as appropriate for fall prevention, nutrition, physical activity, Tobacco-use cessation, weight loss and cognition.  Checklist reviewing preventive services available has been given to the patient.  Reviewed patient's diet, addressing concerns and/or questions.   She is at risk for psychosocial distress and has been provided with information to reduce risk.       See Patient Instructions    Sugar Louis is a 63 year old, presenting for the following:  Physical        5/28/2024     2:38 PM   Additional Questions   Roomed by [facility] Directive  Patient does not have " a Health Care Directive or Living Will: Patient states has Advance Directive and will bring in a copy to clinic.    HPI      Vaginal Symptoms  Onset/Duration: off and on x 2 months   Description:  Vaginal Discharge: curd-like brown    Itching (Pruritis): No  Burning sensation:  YES- occasional   Odor: No  Accompanying Signs & Symptoms:  Urinary symptoms: No  Abdominal pain: No  Fever: No  History:   Sexually active: YES and has become more painful  New Partner: No  Possibility of Pregnancy:  No  Recent antibiotic use: No  Previous vaginitis issues: No  Precipitating or alleviating factors: pain with sex   Therapies tried and outcome: none    H/o total hysterectomy in 2012.          5/27/2024   General Health   How would you rate your overall physical health? Good   Feel stress (tense, anxious, or unable to sleep) Only a little   (!) STRESS CONCERN      5/27/2024   Nutrition   Three or more servings of calcium each day? Yes   Diet: Regular (no restrictions)   How many servings of fruit and vegetables per day? (!) 2-3   How many sweetened beverages each day? 0-1         5/27/2024   Exercise   Days per week of moderate/strenous exercise 5 days   Average minutes spent exercising at this level 70 min         5/27/2024   Social Factors   Frequency of gathering with friends or relatives More than three times a week   Worry food won't last until get money to buy more No   Food not last or not have enough money for food? No   Do you have housing?  Yes   Are you worried about losing your housing? No   Lack of transportation? No   Unable to get utilities (heat,electricity)? No         5/27/2024   Fall Risk   Fallen 2 or more times in the past year? No   Trouble with walking or balance? No          5/27/2024   Dental   Dentist two times every year? Yes         5/27/2024   TB Screening   Were you born outside of the [place]? No         Today's PHQ-2 Score:       5/27/2024     8:15 PM   PHQ-2 ( 1999 [employer])   Q1: Little interest or  pleasure in doing things 0   Q2: Feeling down, depressed or hopeless 0   PHQ-2 Score 0   Q1: Little interest or pleasure in doing things Not at all   Q2: Feeling down, depressed or hopeless Not at all   PHQ-2 Score 0           2024   Substance Use   Alcohol more than 3/day or more than 7/wk No   Do you use any other substances recreationally? No     Social History     Tobacco Use    Smoking status: Never    Smokeless tobacco: Never    Tobacco comments:     Nonsmoking household   Vaping Use    Vaping status: Never Used   Substance Use Topics    Alcohol use: Yes     Comment: Occasionally    Drug use: No           2023   LAST FHS-7 RESULTS   1st degree relative breast or ovarian cancer No   Any relative bilateral breast cancer No   Any male have breast cancer No   Any ONE woman have BOTH breast AND ovarian cancer No   Any woman with breast cancer before 50yrs No   2 or more relatives with breast AND/OR ovarian cancer No   2 or more relatives with breast AND/OR bowel cancer No        Mammogram Screening - Mammogram every 1-2 years updated in Health Maintenance based on mutual decision making    G 3 P 3   Patient's last menstrual period was 10/19/2012.     Fasting: No   Td: tdap 2022       Flu: 2023      Covid: 2021      Shingrix: done      PPV: NA       RSV: discussed             Cholesterol:   Lab Results   Component Value Date    CHOL 239 2023    CHOL 212 03/10/2020     Lab Results   Component Value Date    HDL 78 2023    HDL 68 03/10/2020     Lab Results   Component Value Date     2023     03/10/2020     Lab Results   Component Value Date    TRIG 128 2023    TRIG 74 03/10/2020     Lab Results   Component Value Date    CHOLHDLRATIO 3.2 10/21/2015         MM2023  Dexa:  NA     Flex/colo: 2023 q3y CG      Seat Belt: Yes    Sunscreen use: Yes   Calcium Intake: adeq  Health Care Directive: Yes   Sexually Active: Yes     Current contraception:  hysterectomy  History of abnormal Pap smear: No  Family history of colon/breast/ovarian cancer: No  Regular self breast exam: Yes  History of abnormal mammogram: No          5/27/2024   STI Screening   New sexual partner(s) since last STI/HIV test? No     History of abnormal Pap smear: Status post hysterectomy with removal of cervix and no history of CIN2 or greater or cervical cancer. Health Maintenance and Surgical History updated.        11/27/2012    10:30 AM 10/2/2009    12:00 AM   PAP / HPV   PAP (Historical) OTHER-NIL, See Result  OTHER-NIL EM>40      ASCVD Risk   The 10-year ASCVD risk score (Abraham BOWER, et al., 2019) is: 4%    Values used to calculate the score:      Age: 63 years      Sex: Female      Is Non- : No      Diabetic: No      Tobacco smoker: No      Systolic Blood Pressure: 118 mmHg      Is BP treated: No      HDL Cholesterol: 78 mg/dL      Total Cholesterol: 239 mg/dL           Reviewed and updated as needed this visit by Provider   Tobacco  Allergies  Meds  Problems  Med Hx  Surg Hx  Fam Hx            Labs reviewed in EPIC  BP Readings from Last 3 Encounters:   05/28/24 118/80   01/24/23 129/81   01/10/22 130/82    Wt Readings from Last 3 Encounters:   05/28/24 103.9 kg (229 lb)   09/26/23 99.8 kg (220 lb)   01/24/23 102.1 kg (225 lb)                  Patient Active Problem List   Diagnosis    CARDIOVASCULAR SCREENING; LDL GOAL LESS THAN 160    Female stress incontinence    Factor 5 Leiden mutation, heterozygous (H24)    Multiple thyroid nodules    S/P partial thyroidectomy    Advanced directives, counseling/discussion    Right rotator cuff tendonitis    Complete tear of right rotator cuff     Past Surgical History:   Procedure Laterality Date    ABDOMEN SURGERY  December 2012    Hysterectomy    APPENDECTOMY      BIOPSY  November 2012    utererus    COLONOSCOPY  October 2012    ENT SURGERY  February 1994    Thyroid issue    ORTHOPEDIC SURGERY  01/1976     [place]    TUBAL LIGATION      [facility] ANESTH,CLEFT PALATE REPAIR      [facility] ANESTH,ELBOW,NOS  01/76    [facility] REMOVE BENIGN THYROID LESION  01/2002    nodule removed     [facility] TOTAL ABDOM HYSTERECTOMY  12/14/2012    pathology benign, no further paps needed       Social History     Tobacco Use    Smoking status: Never    Smokeless tobacco: Never    Tobacco comments:     Nonsmoking household   Substance Use Topics    Alcohol use: Yes     Comment: Occasionally     Family History   Problem Relation Age of Onset    Cerebrovascular Disease Mother     Eye Disorder Mother         glaucoma    Heart Disease Mother     Diabetes Mother         blindness    Hypertension Mother     Eye Surgery Mother         cataracts    Cataracts Mother     Other Cancer Brother         Lung Cancer    Diabetes Brother     Lung Cancer Brother     Blood Disease Brother     Obesity Brother         Bariatric surgery    Blood Disease Brother     Diabetes Brother     Blood Disease Brother     Colon Cancer Brother     Rectal Cancer Brother     Other Cancer Brother         Pancreatic Cancer    Hypertension Brother     Other Cancer Brother         Pancreatic Cancer    Pancreatic Cancer Brother     Blood Disease Brother     Blood Disease Son 27        blood clots    Osteoporosis Son         July 2020    Melanoma No family hx of     Glaucoma No family hx of     Macular Degeneration No family hx of          Current Outpatient Medications   Medication Sig Dispense Refill    latanoprost (XALATAN) 0.005 % ophthalmic solution Place 1 drop into both eyes At Bedtime 7.5 mL 3    MULTIVITAMIN TABS   OR 1 TABLET DAILY      Pilocarpine HCl (VUITY) 1.25 % SOLN Apply 1 drop to eye daily 5 mL 11    tretinoin (RETIN-A) 0.05 % external cream Spread a pea size amount into affected area topically at bedtime.  Use sunscreen SPF>20. 45 g 3         Review of Systems  Constitutional, neuro, ENT, endocrine, pulmonary, cardiac, gastrointestinal, genitourinary, musculoskeletal,  "integument and psychiatric systems are negative, except as otherwise noted.     Objective    Exam  /80   Pulse 86   Temp 97.9  F (36.6  C) (Oral)   Resp 14   Ht 1.702 m (5' 7\")   Wt 103.9 kg (229 lb)   LMP 10/19/2012   SpO2 96%   BMI 35.87 kg/m     Estimated body mass index is 35.87 kg/m  as calculated from the following:    Height as of this encounter: 1.702 m (5' 7\").    Weight as of this encounter: 103.9 kg (229 lb).    Physical Exam  GENERAL: alert and no distress  EYES: Eyes grossly normal to inspection, PERRL and conjunctivae and sclerae normal  HENT: ear canals and TM's normal, nose and mouth without ulcers or lesions  NECK: no adenopathy, no asymmetry, masses, or scars  RESP: lungs clear to auscultation - no rales, rhonchi or wheezes  BREAST: normal without masses, tenderness or nipple discharge and no palpable axillary masses or adenopathy  CV: regular rate and rhythm, normal S1 S2, no S3 or S4, no murmur, click or rub, no peripheral edema  ABDOMEN: soft, nontender, no hepatosplenomegaly, no masses and bowel sounds normal  MS: no gross musculoskeletal defects noted, no edema  SKIN: no suspicious lesions or rashes  NEURO: Normal strength and tone, mentation intact and speech normal  PSYCH: mentation appears normal, affect normal/bright    Wet prep: neg for clue/yeast/trich, wbc 2+    Signed Electronically by: Shell Osuna MD    "

## 2024-05-29 LAB
CHOLEST SERPL-MCNC: 219 MG/DL
FASTING STATUS PATIENT QL REPORTED: NO
FASTING STATUS PATIENT QL REPORTED: NO
GLUCOSE SERPL-MCNC: 84 MG/DL (ref 70–99)
HDLC SERPL-MCNC: 62 MG/DL
LDLC SERPL CALC-MCNC: 80 MG/DL
NONHDLC SERPL-MCNC: 157 MG/DL
PATH REPORT.COMMENTS IMP SPEC: ABNORMAL
PATH REPORT.COMMENTS IMP SPEC: YES
PATH REPORT.FINAL DX SPEC: ABNORMAL
PATH REPORT.GROSS SPEC: ABNORMAL
PATH REPORT.MICROSCOPIC SPEC OTHER STN: ABNORMAL
PATH REPORT.RELEVANT HX SPEC: ABNORMAL
TRIGL SERPL-MCNC: 383 MG/DL
TSH SERPL DL<=0.005 MIU/L-ACNC: 0.89 UIU/ML (ref 0.3–4.2)

## 2024-05-30 ENCOUNTER — ANCILLARY PROCEDURE (OUTPATIENT)
Dept: ULTRASOUND IMAGING | Facility: CLINIC | Age: 64
End: 2024-05-30
Attending: FAMILY MEDICINE
Payer: COMMERCIAL

## 2024-05-30 ENCOUNTER — TELEPHONE (OUTPATIENT)
Dept: DERMATOLOGY | Facility: CLINIC | Age: 64
End: 2024-05-30

## 2024-05-30 DIAGNOSIS — D03.9 MELANOMA IN SITU (H): Primary | ICD-10-CM

## 2024-05-30 DIAGNOSIS — E04.2 MULTIPLE THYROID NODULES: ICD-10-CM

## 2024-05-30 PROCEDURE — 76536 US EXAM OF HEAD AND NECK: CPT | Mod: TC | Performed by: RADIOLOGY

## 2024-05-30 NOTE — TELEPHONE ENCOUNTER
----- Message from Brandy Hernandez PA-C sent at 5/30/2024 11:27 AM CDT -----  Notified pt of results of melanoma in situ on the left medial shin; severely dysplastic nevus on the left thigh please schedule mohs  She also needs a 6 month FSE

## 2024-05-30 NOTE — TELEPHONE ENCOUNTER
Spoke to pt. Scheduled for MOHS Surgery x 2. Mohs brochure/Pre-op letter sent via My chart/US mail.       Nissa Jordan RN

## 2024-05-30 NOTE — LETTER
"May 30, 2024    Missy Kirkaptrick  [address]  [facility] [address]        Dear Missy,     You are scheduled for Mohs Surgery on:     Wednesday June 19 th at 7:30 am.  Wednesday June 26 th at 7:30 am.    Please check in at [facility]   (2nd Floor, last  Clinic on right up staircase or elevator -past OB/GYN clinic)    You don't need to arrive more than 5-10 minutes prior to your appointment time.     Be sure to eat a good breakfast and bathe and wash your hair prior to Surgery.    If you are taking any anti-coagulants that are prescribed by your Doctor (such as Coumadin/warfarin, Plavix, Aspirin, Ibuprofen), please continue taking them.     However, If you are taking anti-coagulants over the counter without  a Doctor's order for a Medical condition, please discontinue them 10 days prior to Surgery.      Please wear loose comfortable clothing as it could possibly be 4-6 hours until your surgery is completed depending upon how many layers of tissue need to be removed.     Wi-fi access is available.     Sincerely,       Sal Perez MD/ Nissa Jordan RN              "

## 2024-06-18 NOTE — PROGRESS NOTES
Surgical Office Location :   [facility]  [address] [place] [address]

## 2024-06-19 ENCOUNTER — OFFICE VISIT (OUTPATIENT)
Dept: DERMATOLOGY | Facility: CLINIC | Age: 64
End: 2024-06-19
Payer: COMMERCIAL

## 2024-06-19 DIAGNOSIS — D48.5 NEOPLASM OF UNCERTAIN BEHAVIOR OF SKIN: ICD-10-CM

## 2024-06-19 DIAGNOSIS — D03.72 MELANOMA IN SITU OF LEFT LOWER LEG (H): Primary | ICD-10-CM

## 2024-06-19 PROCEDURE — 17314 MOHS ADDL STAGE T/A/L: CPT | Performed by: DERMATOLOGY

## 2024-06-19 PROCEDURE — 17313 MOHS 1 STAGE T/A/L: CPT | Performed by: DERMATOLOGY

## 2024-06-19 PROCEDURE — 88331 PATH CONSLTJ SURG 1 BLK 1SPC: CPT | Performed by: DERMATOLOGY

## 2024-06-19 PROCEDURE — 88342 IMHCHEM/IMCYTCHM 1ST ANTB: CPT | Performed by: DERMATOLOGY

## 2024-06-19 PROCEDURE — 11402 EXC TR-EXT B9+MARG 1.1-2 CM: CPT | Performed by: DERMATOLOGY

## 2024-06-19 ASSESSMENT — PAIN SCALES - GENERAL: PAINLEVEL: NO PAIN (0)

## 2024-06-19 NOTE — PATIENT INSTRUCTIONS
Open Wound Care     for __leg x2____________        No strenuous activity for 48 hours    Take Tylenol as needed for discomfort.                                                .         Do not drink alcoholic beverages for 48 hours.    Keep the pressure bandage in place for 24 hours. If the bandage becomes blood tinged or loose, reinforce it with gauze and tape.        (Refer to the reverse side of this page for management of bleeding).    Remove bandage in 24 hours and begin wound care as follows:     Clean area with tap water using a Q tip or gauze pad, (shower / bathe normally)  Dry wound with Q tip or gauze pad  Apply Aquaphor, Vaseline, Polysporin or Bacitracin Ointment with a Q tip  Do NOT use Neosporin Ointment *  Cover the wound with a band-aid or nonstick gauze pad and paper tape.  Repeat wound care once a day until wound is completely healed.    It is an old wives tale that a wound heals better when it is exposed to air and allowed to dry out. The wound will heal faster with a better cosmetic result if it is kept moist with ointment and covered with a bandage.  Do not let the wound dry out.      Supplies Needed:                Qtips or gauze pads                Polysporin or Bacitracin Ointment                Bandaids or nonstick gauze pads and paper tape    Wound care kits and brown paper tape are available for purchase at   the pharmacy.    BLEEDING:    Use tightly rolled up gauze or cloth to apply direct pressure over the bandage for 20   minutes.  Reapply pressure for an additional 20 minutes if necessary  Call the office or go to the nearest emergency room if pressure fails to stop the bleeding.  Use additional gauze and tape to maintain pressure once the bleeding has stopped.  Begin wound care 24 hours after surgery as directed.                  WOUND HEALING    One week after surgery a pink / red halo will form around the outside of the wound.   This is new skin.  The center of the wound will appear  yellowish white and produce some drainage.  The pink halo will slowly migrate in toward the center of the wound until the wound is covered with new shiny pink skin.  There will be no more drainage when the wound is completely healed.  It will take six months to one year for the redness to fade.  The scar may be itchy, tight and sensitive to extreme temperatures for a year after the surgery.  Massaging the area several times a day for several minutes after the wound is completely healed will help the scar soften and normalize faster. Begin massage only after healing is complete.      In case of emergency call: Dr Perez: [phone]    [facility]: [phone]    [facility]:[phone]

## 2024-06-19 NOTE — LETTER
6/19/2024      Missy Kirkpatrick  [address]  [facility] [id number]      Dear Colleague,    Thank you for referring your patient, Missy Kirkpatrick, to the [facility]. Please see a copy of my visit note below.    Surgical Office Location :   [facility]  [address] [place] [address]      Missy Kirkpatrick is an extremely pleasant 63 year old year old female patient here today for evaluation and managment of melanoma in situ and severe an.  Patient has no other skin complaints today.  Remainder of the HPI, Meds, PMH, Allergies, FH, and SH was reviewed in chart.      Past Medical History:   Diagnosis Date     Arthritis      Basal cell carcinoma      Factor 5 Leiden mutation, heterozygous (H24)      Factor 5 Leiden mutation, heterozygous (H24)      Factor 5 Leiden mutation, heterozygous (H24)      Glaucoma (increased eye pressure)      Hyperlipidemia LDL goal < 100      Malignant melanoma (H)      Morbid obesity (H)      Thyroid disease 02/1994    Precancerous Thyroid - 1/2 removed       Past Surgical History:   Procedure Laterality Date     ABDOMEN SURGERY  December 2012    Hysterectomy     APPENDECTOMY       BIOPSY  November 2012    utererus     COLONOSCOPY  October 2012     ENT SURGERY  February 1994    Thyroid issue     ORTHOPEDIC SURGERY  01/1976    [place]     TUBAL LIGATION       Z ANESTH,CLEFT PALATE REPAIR       Z ANESTH,ELBOW,NOS  01/76     ZC REMOVE BENIGN THYROID LESION  01/2002    nodule removed      [facility]  12/14/2012    pathology benign, no further paps needed        Family History   Problem Relation Age of Onset     Cerebrovascular Disease Mother      Eye Disorder Mother         glaucoma     Heart Disease Mother      Diabetes Mother         blindness     Hypertension Mother      Eye Surgery Mother         cataracts     Cataracts Mother      Other Cancer Brother         Lung Cancer     Diabetes Brother      Lung  Cancer Brother      Blood Disease Brother      Obesity Brother         Bariatric surgery     Blood Disease Brother      Diabetes Brother      Blood Disease Brother      Colon Cancer Brother      Rectal Cancer Brother      Other Cancer Brother         Pancreatic Cancer     Hypertension Brother      Other Cancer Brother         Pancreatic Cancer     Pancreatic Cancer Brother      Blood Disease Brother      Blood Disease Son 27        blood clots     Osteoporosis Son         July 2020     Melanoma No family hx of      Glaucoma No family hx of      Macular Degeneration No family hx of        Social History     Socioeconomic History     Marital status:      Spouse name: Not on file     Number of children: Not on file     Years of education: Not on file     Highest education level: Not on file   Occupational History     Not on file   Tobacco Use     Smoking status: Never     Smokeless tobacco: Never     Tobacco comments:     Nonsmoking household   Vaping Use     Vaping status: Never Used   Substance and Sexual Activity     Alcohol use: Yes     Comment: Occasionally     Drug use: No     Sexual activity: Yes     Partners: Male     Birth control/protection: Female Surgical     Comment: tubal ligation   Other Topics Concern     Parent/sibling w/ CABG, MI or angioplasty before 65F 55M? Yes     Comment: Brother   Social History Narrative     Not on file     Social Determinants of Health     Financial Resource Strain: Low Risk  (5/27/2024)    Financial Resource Strain      Within the past 12 months, have you or your family members you live with been unable to get utilities (heat, electricity) when it was really needed?: No   Food Insecurity: Low Risk  (5/27/2024)    Food Insecurity      Within the past 12 months, did you worry that your food would run out before you got money to buy more?: No      Within the past 12 months, did the food you bought just not last and you didn t have money to get more?: No   Transportation  Needs: Low Risk  (5/27/2024)    Transportation Needs      Within the past 12 months, has lack of transportation kept you from medical appointments, getting your medicines, non-medical meetings or appointments, work, or from getting things that you need?: No   Physical Activity: Sufficiently Active (5/27/2024)    Exercise Vital Sign      Days of Exercise per Week: 5 days      Minutes of Exercise per Session: 70 min   Stress: No Stress Concern Present (5/27/2024)    [employer] - Occupational Stress Questionnaire      Feeling of Stress : Only a little   Social Connections: Unknown (5/27/2024)    Social Connection and Isolation Panel [NHANES]      Frequency of Communication with Friends and Family: Not on file      Frequency of Social Gatherings with Friends and Family: More than three times a week      Attends [employer] Services: Not on file      Active Member of Clubs or Organizations: Not on file      Attends Club or Organization Meetings: Not on file      Marital Status: Not on file   Interpersonal Safety: Low Risk  (5/28/2024)    Interpersonal Safety      Do you feel physically and emotionally safe where you currently live?: Yes      Within the past 12 months, have you been hit, slapped, kicked or otherwise physically hurt by someone?: No      Within the past 12 months, have you been humiliated or emotionally abused in other ways by your partner or ex-partner?: No   Housing Stability: Low Risk  (5/27/2024)    Housing Stability      Do you have housing? : Yes      Are you worried about losing your housing?: No       Outpatient Encounter Medications as of 6/19/2024   Medication Sig Dispense Refill     estradiol (ESTRACE) 0.1 MG/GM vaginal cream Place 2 g vaginally daily for 14 days, THEN 2 g twice a week for 90 days. 42.5 g 11     latanoprost (XALATAN) 0.005 % ophthalmic solution Place 1 drop into both eyes At Bedtime 7.5 mL 3     MULTIVITAMIN TABS   OR 1 TABLET DAILY       Pilocarpine  HCl (VUITY) 1.25 % SOLN Apply 1 drop to eye daily 5 mL 11     tretinoin (RETIN-A) 0.05 % external cream Spread a pea size amount into affected area topically at bedtime.  Use sunscreen SPF>20. 45 g 3     No facility-administered encounter medications on file as of 6/19/2024.             O:   NAD, WDWN, Alert & Oriented, Mood & Affect wnl, Vitals stable   General appearance normal   Vitals stable   Alert, oriented and in no acute distress      Following lymph nodes palpated: popliteal no lad  L lower leg 3mm red macule  L thigh 4mm red macule       Eyes: Conjunctivae/lids:Normal     ENT: Lips, mucosa: normal    MSK:Normal    Cardiovascular: peripheral edema none    Pulm: Breathing Normal    Neuro/Psych: Orientation:Alert and Orientedx3 ; Mood/Affect:normal       MICRO:   L thigh (green red)#1:mart one positive on one lateral margin  L thigh #2 (green red):Unremarkable epidermis, dermis and superficial subcutis.  No concerning areas for malignancy.    Mart1 negative  A/P:  L lower leg melanoma in situ   MELANOMA DISCUSSED WITH PATIENT:  I discussed the specifics of tumor, prognosis, metachronous melanoma, self exam, and genetics with the patient. I explained the need for monthly skin exams including and taught the patient how to do this. Patient was asked about new or changing moles . I discussed with patient signs and symptoms that could arise in the setting of recurrent locoregional or metastatic disease. In addition, the need to undergo every 6 month dermatologic full skin survey and evaluation given that patients with a diagnosis of melanoma are at risk of recurrence (local and distant) and of subsequent de andreia melanoma.  . I reviewed treatment options, including a discussion of wide excision (the gold standard) versus Mohs surgery with MART-1 immunostains.     Note: MART-1 (Melanoma Antigen Recognized by T-cells) antibody immunostaining was used during Mohs surgery as per standard protocol, in addition to  routine processing of all specimens with hematoxylin and eosin. The peripheral margins/edges were processed with the MART-1 stain (2 specimens total). The center was examined with hematoxylin & eosin and MART-1 immunostains. The patient was informed of the procedure and its risk/benefits during the consent for the procedure.    One or more of the reagents used in immunohistochemical testing in this case may not have been cleared or approved by the U.S. Food and Drug Administration (FDA). The FDA has determined that such clearance or approval is not necessary. These tests are used for clinical purposes. They should not be regarded as investigational or for research. These reagents  performance characteristics have been determined by [facility]. This laboratory is certified under the Clinical Laboratory Improvement Amendments of 1988 (CLIA-88) as qualified to perform high complexity clinical laboratory testing.      MOHS:   Aggressive histology    The rationale for Mohs surgery was discussed with the patient and consent was obtained.  The risks and benefits as well as alternatives to therapy were discussed, in detail.  Specifically, the risks of infection, scarring, bleeding, prolonged wound healing, incomplete removal, allergy to anesthesia, nerve injury and recurrence were addressed.  Indication for Mohs was Aggressive histology. Prior to the procedure, the treatment site was clearly identified and, if available, confirmed with previous photos and confirmed by the patient   All components of the Universal Protocol/PAUSE rule were completed.  The Mohs surgeon operated in two distinct and integrated capacities as the surgeon and pathologist.      The area was prepped with Betasept.  A rim of normal appearing skin was marked circumferentially around the lesion.  The area was infiltrated with local anesthesia.  The tumor was first debulked to remove all clinically apparent tumor.  An incision following  the standard Mohs approach was done and the specimen was oriented,mapped and placed in 2 block(s).  Each specimen was then chromacoded and processed in the Mohs laboratory using standard Mohs technique and submitted for frozen section histology.  Frozen section analysis showed  residual tumor but CLEAR MARGINS.    1st stage positive lateral margin   Second stage mart1 clear    The tumor was excised using standard Mohs technique in 2 stages(s).  MART 1 stains were performed on 2 specimens. CLEAR MARGINS OBTAINED and Final defect size was 1.5 x 1.3 cm.     We discussed the options for wound management in full with the patient including risks/benefits/ possible outcomes.      REPAIR SECOND INTENT: We discussed the options for wound management in full with the patient including risks/benefits/possible outcomes. Decision made to allow the wound to heal by second intention. Cautery was used for for hemostasis. EBL minimal; complications none; wound care routine.  The patient was discharged in good condition and will return in one month or prn for wound evaluation.   2. L thigh atypical nevus  Atypical moles are unusual benign moles that may resemble melanoma. People who have them are at increased risk of developing single or multiple melanomas. The higher the number of these moles someone has, the higher the risk; those who have 10 or more have 12 times the risk of developing melanoma compared to the general population. Atypical nevi are found significantly more often in melanoma patients than in the general population.    While atypical moles are considered to be pre-cancerous (more likely to turn into melanoma than regular moles), not everyone who has atypical moles gets melanoma. In fact, most moles -- both ordinary and atypical ones -- never become cancerous. Thus the removal of all atypical nevi is unnecessary. In fact, most of the melanomas found on people with atypical moles arise from normal skin and not an  "atypical mole.    Although a physician bases the initial diagnosis of atypical moles on a physical examination, removing several moles and examining them under a microscope must confirm the diagnosis. This procedure, is called a biopsy.    A pathologist will examine the tissue under a microscope and make the precise diagnosis. Diagnosis by biopsy is not exact, and in difficult cases doctors may split 50/50 down the middle as to whether a mole is melanoma or benign. If the pathologist uses the term \"severely dysplastic\" or \"atypical melanocytic hyperplasia\" or offers a long descriptive narrative it means he really is concerned about melanoma, but does not want to call it that.    Most dermatologists usually recommend that all patients with these severely dysplastic moles have them removed. Also many dermatologists recommend removing \"moderate dysplasia\" moles, if the biopsy didn't get all of it. Those with \"mild dysplasia\" can usually be left alone or watched.  L thigh an   EXCISION OF Atypical nevus, Margins confirmed with FROZEN SECTIONS, MART1 stain AND Second intent: After thorough discussion of PGACAC, consent obtained, anesthesia and prep, the margins of the lesion were identified and an incision was made encompassing the lesion with 4mm margin. The incisions were made through the skin and down to and including the superficial subcutis.  The lesion was removed en bloc and submitted for frozen section pathologic review. First stage positive lateral margin second stage clear.  Clear margins obtained (1.5x1.2cm).  MART1 stain performed one 2 section(s).    REPAIR SECOND INTENT: We discussed the options for wound management in full with the patient including risks/benefits/possible outcomes. Decision made to allow the wound to heal by second intention. EBL minimal; complications none; wound care routine.  The patient was discharged in good condition and will return in one month or prn for wound evaluation.   It was " a pleasure speaking to Missy Kirkpatrick today.  Previous clinic notes and pertinent laboratory tests were reviewed prior to Missy Kirkpatrick's visit.  Signs and Symptoms of skin cancer discussed with patient.  Patient encouraged to perform monthly skin exams.  UV precautions reviewed with patient.  Return to clinic 6 months        Again, thank you for allowing me to participate in the care of your patient.        Sincerely,        Sal Perez MD

## 2024-06-19 NOTE — PROGRESS NOTES
Missy Kirkpatrick is an extremely pleasant 63 year old year old female patient here today for evaluation and managment of melanoma in situ and severe an.  Patient has no other skin complaints today.  Remainder of the HPI, Meds, PMH, Allergies, FH, and SH was reviewed in chart.      Past Medical History:   Diagnosis Date    Arthritis     Basal cell carcinoma     Factor 5 Leiden mutation, heterozygous (H24)     Factor 5 Leiden mutation, heterozygous (H24)     Factor 5 Leiden mutation, heterozygous (H24)     Glaucoma (increased eye pressure)     Hyperlipidemia LDL goal < 100     Malignant melanoma (H)     Morbid obesity (H)     Thyroid disease 02/1994    Precancerous Thyroid - 1/2 removed       Past Surgical History:   Procedure Laterality Date    ABDOMEN SURGERY  December 2012    Hysterectomy    APPENDECTOMY      BIOPSY  November 2012    utererus    COLONOSCOPY  October 2012    ENT SURGERY  February 1994    Thyroid issue    ORTHOPEDIC SURGERY  01/1976    Broken [place]bow    TUBAL LIGATION      ZC ANESTH,CLEFT PALATE REPAIR      ZC ANESTH,ELBOW,NOS  01/76    ZZC REMOVE BENIGN THYROID LESION  01/2002    nodule removed     ZC TOTAL ABDOM HYSTERECTOMY  12/14/2012    pathology benign, no further paps needed        Family History   Problem Relation Age of Onset    Cerebrovascular Disease Mother     Eye Disorder Mother         glaucoma    Heart Disease Mother     Diabetes Mother         blindness    Hypertension Mother     Eye Surgery Mother         cataracts    Cataracts Mother     Other Cancer Brother         Lung Cancer    Diabetes Brother     Lung Cancer Brother     Blood Disease Brother     Obesity Brother         Bariatric surgery    Blood Disease Brother     Diabetes Brother     Blood Disease Brother     Colon Cancer Brother     Rectal Cancer Brother     Other Cancer Brother         Pancreatic Cancer    Hypertension Brother     Other Cancer Brother         Pancreatic Cancer    Pancreatic Cancer Brother     Blood  Disease Brother     Blood Disease Son 27        blood clots    Osteoporosis Son         July 2020    Melanoma No family hx of     Glaucoma No family hx of     Macular Degeneration No family hx of        Social History     Socioeconomic History    Marital status:      Spouse name: Not on file    Number of children: Not on file    Years of education: Not on file    Highest education level: Not on file   Occupational History    Not on file   Tobacco Use    Smoking status: Never    Smokeless tobacco: Never    Tobacco comments:     Nonsmoking household   Vaping Use    Vaping status: Never Used   Substance and Sexual Activity    Alcohol use: Yes     Comment: Occasionally    Drug use: No    Sexual activity: Yes     Partners: Male     Birth control/protection: Female Surgical     Comment: tubal ligation   Other Topics Concern    Parent/sibling w/ CABG, MI or angioplasty before 65F 55M? Yes     Comment: Brother   Social History Narrative    Not on file     Social Determinants of Health     Financial Resource Strain: Low Risk  (5/27/2024)    Financial Resource Strain     Within the past 12 months, have you or your family members you live with been unable to get utilities (heat, electricity) when it was really needed?: No   Food Insecurity: Low Risk  (5/27/2024)    Food Insecurity     Within the past 12 months, did you worry that your food would run out before you got money to buy more?: No     Within the past 12 months, did the food you bought just not last and you didn t have money to get more?: No   Transportation Needs: Low Risk  (5/27/2024)    Transportation Needs     Within the past 12 months, has lack of transportation kept you from medical appointments, getting your medicines, non-medical meetings or appointments, work, or from getting things that you need?: No   Physical Activity: Sufficiently Active (5/27/2024)    Exercise Vital Sign     Days of Exercise per Week: 5 days     Minutes of Exercise per Session: 70  min   Stress: No Stress Concern Present (5/27/2024)    [employer] [place] [employer] - Occupational Stress Questionnaire     Feeling of Stress : Only a little   Social Connections: Unknown (5/27/2024)    Social Connection and Isolation Panel [NHANES]     Frequency of Communication with Friends and Family: Not on file     Frequency of Social Gatherings with Friends and Family: More than three times a week     Attends [employer] Services: Not on file     Active Member of Clubs or Organizations: Not on file     Attends Club or Organization Meetings: Not on file     Marital Status: Not on file   Interpersonal Safety: Low Risk  (5/28/2024)    Interpersonal Safety     Do you feel physically and emotionally safe where you currently live?: Yes     Within the past 12 months, have you been hit, slapped, kicked or otherwise physically hurt by someone?: No     Within the past 12 months, have you been humiliated or emotionally abused in other ways by your partner or ex-partner?: No   Housing Stability: Low Risk  (5/27/2024)    Housing Stability     Do you have housing? : Yes     Are you worried about losing your housing?: No       Outpatient Encounter Medications as of 6/19/2024   Medication Sig Dispense Refill    estradiol (ESTRACE) 0.1 MG/GM vaginal cream Place 2 g vaginally daily for 14 days, THEN 2 g twice a week for 90 days. 42.5 g 11    latanoprost (XALATAN) 0.005 % ophthalmic solution Place 1 drop into both eyes At Bedtime 7.5 mL 3    MULTIVITAMIN TABS   OR 1 TABLET DAILY      Pilocarpine HCl (VUITY) 1.25 % SOLN Apply 1 drop to eye daily 5 mL 11    tretinoin (RETIN-A) 0.05 % external cream Spread a pea size amount into affected area topically at bedtime.  Use sunscreen SPF>20. 45 g 3     No facility-administered encounter medications on file as of 6/19/2024.             O:   NAD, WDWN, Alert & Oriented, Mood & Affect wnl, Vitals stable   General appearance normal   Vitals stable   Alert, oriented and in no acute  distress      Following lymph nodes palpated: popliteal no lad  L lower leg 3mm red macule  L thigh 4mm red macule       Eyes: Conjunctivae/lids:Normal     ENT: Lips, mucosa: normal    MSK:Normal    Cardiovascular: peripheral edema none    Pulm: Breathing Normal    Neuro/Psych: Orientation:Alert and Orientedx3 ; Mood/Affect:normal       MICRO:   L thigh (green red)#1:mart one positive on one lateral margin  L thigh #2 (green red):Unremarkable epidermis, dermis and superficial subcutis.  No concerning areas for malignancy.    Mart1 negative  A/P:  L lower leg melanoma in situ   MELANOMA DISCUSSED WITH PATIENT:  I discussed the specifics of tumor, prognosis, metachronous melanoma, self exam, and genetics with the patient. I explained the need for monthly skin exams including and taught the patient how to do this. Patient was asked about new or changing moles . I discussed with patient signs and symptoms that could arise in the setting of recurrent locoregional or metastatic disease. In addition, the need to undergo every 6 month dermatologic full skin survey and evaluation given that patients with a diagnosis of melanoma are at risk of recurrence (local and distant) and of subsequent de andreia melanoma.  . I reviewed treatment options, including a discussion of wide excision (the gold standard) versus Mohs surgery with MART-1 immunostains.     Note: MART-1 (Melanoma Antigen Recognized by T-cells) antibody immunostaining was used during Mohs surgery as per standard protocol, in addition to routine processing of all specimens with hematoxylin and eosin. The peripheral margins/edges were processed with the MART-1 stain (2 specimens total). The center was examined with hematoxylin & eosin and MART-1 immunostains. The patient was informed of the procedure and its risk/benefits during the consent for the procedure.    One or more of the reagents used in immunohistochemical testing in this case may not have been cleared or  approved by the U.S. Food and Drug Administration (FDA). The FDA has determined that such clearance or approval is not necessary. These tests are used for clinical purposes. They should not be regarded as investigational or for research. These reagents  performance characteristics have been determined by [facility]. This laboratory is certified under the Clinical Laboratory Improvement Amendments of 1988 (CLIA-88) as qualified to perform high complexity clinical laboratory testing.      MOHS:   Aggressive histology    The rationale for Mohs surgery was discussed with the patient and consent was obtained.  The risks and benefits as well as alternatives to therapy were discussed, in detail.  Specifically, the risks of infection, scarring, bleeding, prolonged wound healing, incomplete removal, allergy to anesthesia, nerve injury and recurrence were addressed.  Indication for Mohs was Aggressive histology. Prior to the procedure, the treatment site was clearly identified and, if available, confirmed with previous photos and confirmed by the patient   All components of the Universal Protocol/PAUSE rule were completed.  The Mohs surgeon operated in two distinct and integrated capacities as the surgeon and pathologist.      The area was prepped with Betasept.  A rim of normal appearing skin was marked circumferentially around the lesion.  The area was infiltrated with local anesthesia.  The tumor was first debulked to remove all clinically apparent tumor.  An incision following the standard Mohs approach was done and the specimen was oriented,mapped and placed in 2 block(s).  Each specimen was then chromacoded and processed in the Mohs laboratory using standard Mohs technique and submitted for frozen section histology.  Frozen section analysis showed  residual tumor but CLEAR MARGINS.    1st stage positive lateral margin   Second stage mart1 clear    The tumor was excised using standard Mohs technique in 2  stages(s).  MART 1 stains were performed on 2 specimens. CLEAR MARGINS OBTAINED and Final defect size was 1.5 x 1.3 cm.     We discussed the options for wound management in full with the patient including risks/benefits/ possible outcomes.      REPAIR SECOND INTENT: We discussed the options for wound management in full with the patient including risks/benefits/possible outcomes. Decision made to allow the wound to heal by second intention. Cautery was used for for hemostasis. EBL minimal; complications none; wound care routine.  The patient was discharged in good condition and will return in one month or prn for wound evaluation.   2. L thigh atypical nevus  Atypical moles are unusual benign moles that may resemble melanoma. People who have them are at increased risk of developing single or multiple melanomas. The higher the number of these moles someone has, the higher the risk; those who have 10 or more have 12 times the risk of developing melanoma compared to the general population. Atypical nevi are found significantly more often in melanoma patients than in the general population.    While atypical moles are considered to be pre-cancerous (more likely to turn into melanoma than regular moles), not everyone who has atypical moles gets melanoma. In fact, most moles -- both ordinary and atypical ones -- never become cancerous. Thus the removal of all atypical nevi is unnecessary. In fact, most of the melanomas found on people with atypical moles arise from normal skin and not an atypical mole.    Although a physician bases the initial diagnosis of atypical moles on a physical examination, removing several moles and examining them under a microscope must confirm the diagnosis. This procedure, is called a biopsy.    A pathologist will examine the tissue under a microscope and make the precise diagnosis. Diagnosis by biopsy is not exact, and in difficult cases doctors may split 50/50 down the middle as to whether a  "mole is melanoma or benign. If the pathologist uses the term \"severely dysplastic\" or \"atypical melanocytic hyperplasia\" or offers a long descriptive narrative it means he really is concerned about melanoma, but does not want to call it that.    Most dermatologists usually recommend that all patients with these severely dysplastic moles have them removed. Also many dermatologists recommend removing \"moderate dysplasia\" moles, if the biopsy didn't get all of it. Those with \"mild dysplasia\" can usually be left alone or watched.  L thigh an   EXCISION OF Atypical nevus, Margins confirmed with FROZEN SECTIONS, MART1 stain AND Second intent: After thorough discussion of PGACAC, consent obtained, anesthesia and prep, the margins of the lesion were identified and an incision was made encompassing the lesion with 4mm margin. The incisions were made through the skin and down to and including the superficial subcutis.  The lesion was removed en bloc and submitted for frozen section pathologic review. First stage positive lateral margin second stage clear.  Clear margins obtained (1.5x1.2cm).  MART1 stain performed one 2 section(s).    REPAIR SECOND INTENT: We discussed the options for wound management in full with the patient including risks/benefits/possible outcomes. Decision made to allow the wound to heal by second intention. EBL minimal; complications none; wound care routine.  The patient was discharged in good condition and will return in one month or prn for wound evaluation.   It was a pleasure speaking to Missy Kirkpatrick today.  Previous clinic notes and pertinent laboratory tests were reviewed prior to Missy Kirkpatrick's visit.  Signs and Symptoms of skin cancer discussed with patient.  Patient encouraged to perform monthly skin exams.  UV precautions reviewed with patient.  Return to clinic 6 months      "

## 2024-08-30 ENCOUNTER — ANCILLARY PROCEDURE (OUTPATIENT)
Dept: MAMMOGRAPHY | Facility: CLINIC | Age: 64
End: 2024-08-30
Attending: FAMILY MEDICINE
Payer: COMMERCIAL

## 2024-08-30 DIAGNOSIS — Z12.31 VISIT FOR SCREENING MAMMOGRAM: ICD-10-CM

## 2024-08-30 PROCEDURE — 77063 BREAST TOMOSYNTHESIS BI: CPT | Mod: TC | Performed by: RADIOLOGY

## 2024-08-30 PROCEDURE — 77067 SCR MAMMO BI INCL CAD: CPT | Mod: TC | Performed by: RADIOLOGY

## 2024-09-05 ENCOUNTER — MYC REFILL (OUTPATIENT)
Dept: OPHTHALMOLOGY | Facility: CLINIC | Age: 64
End: 2024-09-05
Payer: COMMERCIAL

## 2024-09-05 DIAGNOSIS — H40.003 GLAUCOMA SUSPECT OF BOTH EYES: ICD-10-CM

## 2024-09-09 RX ORDER — LATANOPROST 50 UG/ML
1 SOLUTION/ DROPS OPHTHALMIC AT BEDTIME
Qty: 7.5 ML | Refills: 3 | Status: SHIPPED | OUTPATIENT
Start: 2024-09-09

## 2024-10-19 ENCOUNTER — MYC MEDICAL ADVICE (OUTPATIENT)
Dept: FAMILY MEDICINE | Facility: CLINIC | Age: 64
End: 2024-10-19
Payer: COMMERCIAL

## 2024-10-22 ENCOUNTER — VIRTUAL VISIT (OUTPATIENT)
Dept: FAMILY MEDICINE | Facility: CLINIC | Age: 64
End: 2024-10-22
Payer: COMMERCIAL

## 2024-10-22 DIAGNOSIS — E66.812 CLASS 2 OBESITY DUE TO EXCESS CALORIES WITHOUT SERIOUS COMORBIDITY WITH BODY MASS INDEX (BMI) OF 35.0 TO 35.9 IN ADULT: Primary | ICD-10-CM

## 2024-10-22 DIAGNOSIS — E66.09 CLASS 2 OBESITY DUE TO EXCESS CALORIES WITHOUT SERIOUS COMORBIDITY WITH BODY MASS INDEX (BMI) OF 35.0 TO 35.9 IN ADULT: Primary | ICD-10-CM

## 2024-10-22 PROCEDURE — 99213 OFFICE O/P EST LOW 20 MIN: CPT | Mod: 95 | Performed by: FAMILY MEDICINE

## 2024-10-22 PROCEDURE — G2211 COMPLEX E/M VISIT ADD ON: HCPCS | Mod: 95 | Performed by: FAMILY MEDICINE

## 2024-10-22 RX ORDER — PHENTERMINE HYDROCHLORIDE 15 MG/1
15 CAPSULE ORAL EVERY MORNING
Qty: 30 CAPSULE | Refills: 0 | Status: SHIPPED | OUTPATIENT
Start: 2024-10-22 | End: 2024-11-11

## 2024-10-22 NOTE — PROGRESS NOTES
Missy is a 64 year old who is being evaluated via a billable video visit.    How would you like to obtain your AVS? MyChart  If the video visit is dropped, the invitation should be resent by: Text to cell phone: [phone]  Will anyone else be joining your video visit? No      Assessment & Plan     (E66.812,  E66.09,  Z68.35) Class 2 obesity due to excess calories without serious comorbidity with body mass index (BMI) of 35.0 to 35.9 in adult  (primary encounter diagnosis)  Comment: has tried caloric restriction with physical activity without meaningful/lasting wt loss  Plan: phentermine 15 MG capsule        Trial of phentermine, plan bp check after 2 weeks.  Consider dose change after 1 month.  Discussed potential side effects and actions to take if they occur.     The longitudinal plan of care for the diagnosis(es)/condition(s) as documented were addressed during this visit. Due to the added complexity in care, I will continue to support Missy in the subsequent management and with ongoing continuity of care.        See Patient Instructions    Subjective   Missy is a 64 year old, presenting for the following health issues:  Medication Request (Phentermine)        5/28/2024     2:38 PM   Additional Questions   Roomed by Yasmany     History of Present Illness       Reason for visit:  Request of medication   She is taking medications regularly.  Patient is here today to discuss possible weight loss medications.  She has worked hard at remaining active she plays pickle ball most days of the week she watches her 8 grandchildren and stays active with them.  She has also improved her eating cut back on soda as well as beer intake and still sees that she cannot seem to lose more than 10 pounds.  Her weight ranges between 220 and 230 and she just cannot seem to make any progress.  Her spouse currently is taking Topamax and is seeing good weight loss and she is following along with his change in eating habits as well.  She  is interested in trying phentermine.  She has no history of heart disease or hypertension.            Review of Systems  Constitutional, HEENT, cardiovascular, pulmonary, gi and gu systems are negative, except as otherwise noted.      Objective           Vitals:  No vitals were obtained today due to virtual visit.  #224 per home scale, BMI 35     Physical Exam   GENERAL: alert and no distress  EYES: Eyes grossly normal to inspection.  No discharge or erythema, or obvious scleral/conjunctival abnormalities.  RESP: No audible wheeze, cough, or visible cyanosis.    SKIN: Visible skin clear. No significant rash, abnormal pigmentation or lesions.  NEURO: Cranial nerves grossly intact.  Mentation and speech appropriate for age.  PSYCH: Appropriate affect, tone, and pace of words          Video-Visit Details    Type of service:  Video Visit   Originating Location (pt. Location): Home    Distant Location (provider location):  On-site  Platform used for Video Visit: Stephanie  Signed Electronically by: Shell Osuna MD

## 2024-11-11 ENCOUNTER — MYC MEDICAL ADVICE (OUTPATIENT)
Dept: FAMILY MEDICINE | Facility: CLINIC | Age: 64
End: 2024-11-11
Payer: COMMERCIAL

## 2024-11-11 DIAGNOSIS — E66.812 CLASS 2 OBESITY DUE TO EXCESS CALORIES WITHOUT SERIOUS COMORBIDITY WITH BODY MASS INDEX (BMI) OF 35.0 TO 35.9 IN ADULT: ICD-10-CM

## 2024-11-11 DIAGNOSIS — E66.09 CLASS 2 OBESITY DUE TO EXCESS CALORIES WITHOUT SERIOUS COMORBIDITY WITH BODY MASS INDEX (BMI) OF 35.0 TO 35.9 IN ADULT: ICD-10-CM

## 2024-11-11 RX ORDER — PHENTERMINE HYDROCHLORIDE 15 MG/1
15 CAPSULE ORAL EVERY MORNING
Qty: 90 CAPSULE | Refills: 0 | Status: SHIPPED | OUTPATIENT
Start: 2024-11-11

## 2024-11-12 ENCOUNTER — TRANSFERRED RECORDS (OUTPATIENT)
Dept: HEALTH INFORMATION MANAGEMENT | Facility: CLINIC | Age: 64
End: 2024-11-12
Payer: COMMERCIAL

## 2024-11-15 ENCOUNTER — TELEPHONE (OUTPATIENT)
Dept: DERMATOLOGY | Facility: CLINIC | Age: 64
End: 2024-11-15
Payer: COMMERCIAL

## 2024-11-15 NOTE — TELEPHONE ENCOUNTER
[facility] Call Center    Phone Message    May a detailed message be left on voicemail: yes     Reason for Call: Other: Pt was wondering if a 6 month follow up post MOHS was needed since there wasn't one scheduled. She had the procedure in June. Please call pt back to discuss. Thank you     Action Taken: TE SENT    Travel Screening: Not Applicable     Date of Service:             Thank you!  [facility]

## 2024-11-15 NOTE — TELEPHONE ENCOUNTER
Patient was last seen on 6/19/24 for managment of melanoma in situ and severe AN.    A 6 month recheck was advised.    Please help patient schedule FSE with any provider that has availability in December or January. Double book with Dr. Chris cameron.    Geeta Nath RN on 11/15/2024 at 1:52 PM

## 2024-11-18 NOTE — TELEPHONE ENCOUNTER
Spoke to patient and offered December 5 hold slot, but she is out of town. She accepted a 1-2-25 work in [facility].     Nissa Jordan RN

## 2025-01-02 ENCOUNTER — OFFICE VISIT (OUTPATIENT)
Dept: DERMATOLOGY | Facility: CLINIC | Age: 65
End: 2025-01-02
Payer: COMMERCIAL

## 2025-01-02 DIAGNOSIS — L82.1 SEBORRHEIC KERATOSIS: ICD-10-CM

## 2025-01-02 DIAGNOSIS — D22.9 NEVUS: ICD-10-CM

## 2025-01-02 DIAGNOSIS — D18.01 ANGIOMA OF SKIN: ICD-10-CM

## 2025-01-02 DIAGNOSIS — Z87.898 HISTORY OF ATYPICAL NEVUS: ICD-10-CM

## 2025-01-02 DIAGNOSIS — Z85.828 HISTORY OF BASAL CELL CARCINOMA (BCC): ICD-10-CM

## 2025-01-02 DIAGNOSIS — L81.4 LENTIGO: Primary | ICD-10-CM

## 2025-01-02 DIAGNOSIS — Z86.006 HISTORY OF MELANOMA IN SITU: ICD-10-CM

## 2025-01-02 ASSESSMENT — PAIN SCALES - GENERAL: PAINLEVEL_OUTOF10: NO PAIN (0)

## 2025-01-02 NOTE — LETTER
1/2/2025      Missy Kirkpatrick  [address]  [facility] [address]      Dear Colleague,    Thank you for referring your patient, Missy Kirkpatrick, to the [facility]. Please see a copy of my visit note below.    Missy Kirkpatrick is a pleasant 64 year old year old female patient here today for skin check. She had MIS removed for left shin  and severely atypical removal from left thigh this past May. She notes she is leaving for for [place] in the next few days, she is staying for 12 days.  Patient has no other skin complaints today.  Remainder of the HPI, Meds, PMH, Allergies, FH, and SH was reviewed in chart.    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, history of severely atypical   Past Medical History:   Diagnosis Date     Arthritis      Basal cell carcinoma      Factor 5 Leiden mutation, heterozygous (H)      Factor 5 Leiden mutation, heterozygous (H)      Factor 5 Leiden mutation, heterozygous (H)      Glaucoma (increased eye pressure)      Hyperlipidemia LDL goal < 100      Malignant melanoma (H)      Morbid obesity (H)      Thyroid disease 02/1994    Precancerous Thyroid - 1/2 removed       Past Surgical History:   Procedure Laterality Date     ABDOMEN SURGERY  December 2012    Hysterectomy     APPENDECTOMY       BIOPSY  November 2012    utererus     COLONOSCOPY  October 2012     ENT SURGERY  February 1994    Thyroid issue     ORTHOPEDIC SURGERY  01/1976    [place]     TUBAL LIGATION       [facility] ANESTH,CLEFT PALATE REPAIR       [facility] ANESTH,ELBOW,NOS  01/76     Z REMOVE BENIGN THYROID LESION  01/2002    nodule removed      [facility] TOTAL ABDOM HYSTERECTOMY  12/14/2012    pathology benign, no further paps needed        Family History   Problem Relation Age of Onset     Cerebrovascular Disease Mother      Eye Disorder Mother         glaucoma     Heart Disease Mother      Diabetes Mother         blindness     Hypertension Mother      Eye Surgery Mother          cataracts     Cataracts Mother      Other Cancer Brother         Lung Cancer     Diabetes Brother      Lung Cancer Brother      Blood Disease Brother      Obesity Brother         Bariatric surgery     Blood Disease Brother      Diabetes Brother      Blood Disease Brother      Colon Cancer Brother      Rectal Cancer Brother      Other Cancer Brother         Pancreatic Cancer     Hypertension Brother      Other Cancer Brother         Pancreatic Cancer     Pancreatic Cancer Brother      Blood Disease Brother      Blood Disease Son 27        blood clots     Osteoporosis Son         July 2020     Melanoma No family hx of      Glaucoma No family hx of      Macular Degeneration No family hx of        Social History     Socioeconomic History     Marital status:      Spouse name: Not on file     Number of children: Not on file     Years of education: Not on file     Highest education level: Not on file   Occupational History     Not on file   Tobacco Use     Smoking status: Never     Smokeless tobacco: Never     Tobacco comments:     Nonsmoking household   Vaping Use     Vaping status: Never Used   Substance and Sexual Activity     Alcohol use: Yes     Comment: Occasionally     Drug use: No     Sexual activity: Yes     Partners: Male     Birth control/protection: Female Surgical     Comment: tubal ligation   Other Topics Concern     Parent/sibling w/ CABG, MI or angioplasty before 65F 55M? Yes     Comment: Brother   Social History Narrative     Not on file     Social Drivers of Health     Financial Resource Strain: Low Risk  (5/27/2024)    Financial Resource Strain      Within the past 12 months, have you or your family members you live with been unable to get utilities (heat, electricity) when it was really needed?: No   Food Insecurity: Low Risk  (5/27/2024)    Food Insecurity      Within the past 12 months, did you worry that your food would run out before you got money to buy more?: No      Within the past  12 months, did the food you bought just not last and you didn t have money to get more?: No   Transportation Needs: Low Risk  (5/27/2024)    Transportation Needs      Within the past 12 months, has lack of transportation kept you from medical appointments, getting your medicines, non-medical meetings or appointments, work, or from getting things that you need?: No   Physical Activity: Sufficiently Active (5/27/2024)    Exercise Vital Sign      Days of Exercise per Week: 5 days      Minutes of Exercise per Session: 70 min   Stress: No Stress Concern Present (5/27/2024)    [employer] [place] [employer] - Occupational Stress Questionnaire      Feeling of Stress : Only a little   Social Connections: Unknown (5/27/2024)    Social Connection and Isolation Panel [NHANES]      Frequency of Communication with Friends and Family: Not on file      Frequency of Social Gatherings with Friends and Family: More than three times a week      Attends Shinto Services: Not on file      Active Member of Clubs or Organizations: Not on file      Attends Club or Organization Meetings: Not on file      Marital Status: Not on file   Interpersonal Safety: Low Risk  (5/28/2024)    Interpersonal Safety      Do you feel physically and emotionally safe where you currently live?: Yes      Within the past 12 months, have you been hit, slapped, kicked or otherwise physically hurt by someone?: No      Within the past 12 months, have you been humiliated or emotionally abused in other ways by your partner or ex-partner?: No   Housing Stability: Low Risk  (5/27/2024)    Housing Stability      Do you have housing? : Yes      Are you worried about losing your housing?: No       Outpatient Encounter Medications as of 1/2/2025   Medication Sig Dispense Refill     latanoprost (XALATAN) 0.005 % ophthalmic solution Place 1 drop into both eyes at bedtime. 7.5 mL 3     MULTIVITAMIN TABS   OR 1 TABLET DAILY       phentermine 15 MG capsule Take 1  capsule (15 mg) by mouth every morning. 90 capsule 0     Pilocarpine HCl (VUITY) 1.25 % SOLN Apply 1 drop to eye daily 5 mL 11     tretinoin (RETIN-A) 0.05 % external cream Spread a pea size amount into affected area topically at bedtime.  Use sunscreen SPF>20. 45 g 3     No facility-administered encounter medications on file as of 1/2/2025.             O:   NAD, WDWN, Alert & Oriented, Mood & Affect wnl, Vitals stable   Here today alone   LMP 10/19/2012    General appearance normal   Vitals stable   Alert, oriented and in no acute distress     Stuck on papules and brown macules on trunk and ext   Red papules on trunk  Brown papules and macules with regular pigment network and borders on torso and extremities   Brown stuck on papules on face and scalp   Hyperpigmented scar on left thigh and left shin         Eyes: Conjunctivae/lids:Normal     ENT: Lips normal    MSK:Normal    Cardiovascular: peripheral edema none    Pulm: Breathing Normal    Neuro/Psych: Orientation:Alert and Orientedx3 ; Mood/Affect:normal   A/P:  1. 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 I explained the need for monthly skin exams including and taught the patient how to do this. Patient was asked about new or changing moles . I discussed with patient signs and symptoms that could arise in the setting of recurrent locoregional or metastatic disease. In addition, the need to undergo every 4 month dermatologic full skin survey and evaluation given that patients with a diagnosis of melanoma are at risk of recurrence (local and distant) and of subsequent de andreia melanoma.    2. Seborrheic keratosis, lentigo, angioma, benign nevi, history of BCC  It was a pleasure speaking to Missy Kirkpatrick today. Monitor brown macule on right superior shin, 0.2  cm in size, picture  taken.   .BENIGN LESIONS DISCUSSED WITH PATIENT:  I discussed the specifics of tumor, prognosis, and genetics of benign lesions.  I explained that treatment of these lesions would be purely cosmetic and not medically neccessary.  I discussed with patient different removal options including excision, cautery and /or laser.      Nature and genetics of benign skin lesions dicussed with patient.  Signs and Symptoms of skin cancer discussed with patient.  ABCDEs of melanoma reviewed with patient.  Patient encouraged to perform monthly skin exams.  UV precautions reviewed with patient.  Risks of non-melanoma skin cancer discussed with patient   Return to clinic in 6 months.       Again, thank you for allowing me to participate in the care of your patient.        Sincerely,        Anisa Hsu PA-C    Electronically signed

## 2025-01-03 NOTE — PROGRESS NOTES
Missy Kirkpatrick is a pleasant 64 year old year old female patient here today for skin check. She had MIS removed for left shin  and severely atypical removal from left thigh this past May. She notes she is leaving for for [place] in the next few days, she is staying for 12 days.  Patient has no other skin complaints today.  Remainder of the HPI, Meds, PMH, Allergies, FH, and SH was reviewed in chart.    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, history of severely atypical   Past Medical History:   Diagnosis Date    Arthritis     Basal cell carcinoma     Factor 5 Leiden mutation, heterozygous (H)     Factor 5 Leiden mutation, heterozygous (H)     Factor 5 Leiden mutation, heterozygous (H)     Glaucoma (increased eye pressure)     Hyperlipidemia LDL goal < 100     Malignant melanoma (H)     Morbid obesity (H)     Thyroid disease 02/1994    Precancerous Thyroid - 1/2 removed       Past Surgical History:   Procedure Laterality Date    ABDOMEN SURGERY  December 2012    Hysterectomy    APPENDECTOMY      BIOPSY  November 2012    utererus    COLONOSCOPY  October 2012    ENT SURGERY  February 1994    Thyroid issue    ORTHOPEDIC SURGERY  01/1976    [place]    TUBAL LIGATION      [facility] ANESTH,CLEFT PALATE REPAIR      [facility] ANESTH,ELBOW,NOS  01/76    [facility] REMOVE BENIGN THYROID LESION  01/2002    nodule removed     [facility] TOTAL ABDOM HYSTERECTOMY  12/14/2012    pathology benign, no further paps needed        Family History   Problem Relation Age of Onset    Cerebrovascular Disease Mother     Eye Disorder Mother         glaucoma    Heart Disease Mother     Diabetes Mother         blindness    Hypertension Mother     Eye Surgery Mother         cataracts    Cataracts Mother     Other Cancer Brother         Lung Cancer    Diabetes Brother     Lung Cancer Brother     Blood Disease Brother     Obesity Brother         Bariatric surgery    Blood Disease Brother     Diabetes Brother     Blood Disease Brother      Colon Cancer Brother     Rectal Cancer Brother     Other Cancer Brother         Pancreatic Cancer    Hypertension Brother     Other Cancer Brother         Pancreatic Cancer    Pancreatic Cancer Brother     Blood Disease Brother     Blood Disease Son 27        blood clots    Osteoporosis Son         July 2020    Melanoma No family hx of     Glaucoma No family hx of     Macular Degeneration No family hx of        Social History     Socioeconomic History    Marital status:      Spouse name: Not on file    Number of children: Not on file    Years of education: Not on file    Highest education level: Not on file   Occupational History    Not on file   Tobacco Use    Smoking status: Never    Smokeless tobacco: Never    Tobacco comments:     Nonsmoking household   Vaping Use    Vaping status: Never Used   Substance and Sexual Activity    Alcohol use: Yes     Comment: Occasionally    Drug use: No    Sexual activity: Yes     Partners: Male     Birth control/protection: Female Surgical     Comment: tubal ligation   Other Topics Concern    Parent/sibling w/ CABG, MI or angioplasty before 65F 55M? Yes     Comment: Brother   Social History Narrative    Not on file     Social Drivers of Health     Financial Resource Strain: Low Risk  (5/27/2024)    Financial Resource Strain     Within the past 12 months, have you or your family members you live with been unable to get utilities (heat, electricity) when it was really needed?: No   Food Insecurity: Low Risk  (5/27/2024)    Food Insecurity     Within the past 12 months, did you worry that your food would run out before you got money to buy more?: No     Within the past 12 months, did the food you bought just not last and you didn t have money to get more?: No   Transportation Needs: Low Risk  (5/27/2024)    Transportation Needs     Within the past 12 months, has lack of transportation kept you from medical appointments, getting your medicines, non-medical meetings or  appointments, work, or from getting things that you need?: No   Physical Activity: Sufficiently Active (5/27/2024)    Exercise Vital Sign     Days of Exercise per Week: 5 days     Minutes of Exercise per Session: 70 min   Stress: No Stress Concern Present (5/27/2024)    [employer] [place] [employer] - Occupational Stress Questionnaire     Feeling of Stress : Only a little   Social Connections: Unknown (5/27/2024)    Social Connection and Isolation Panel [NHANES]     Frequency of Communication with Friends and Family: Not on file     Frequency of Social Gatherings with Friends and Family: More than three times a week     Attends Mormonism Services: Not on file     Active Member of Clubs or Organizations: Not on file     Attends Club or Organization Meetings: Not on file     Marital Status: Not on file   Interpersonal Safety: Low Risk  (5/28/2024)    Interpersonal Safety     Do you feel physically and emotionally safe where you currently live?: Yes     Within the past 12 months, have you been hit, slapped, kicked or otherwise physically hurt by someone?: No     Within the past 12 months, have you been humiliated or emotionally abused in other ways by your partner or ex-partner?: No   Housing Stability: Low Risk  (5/27/2024)    Housing Stability     Do you have housing? : Yes     Are you worried about losing your housing?: No       Outpatient Encounter Medications as of 1/2/2025   Medication Sig Dispense Refill    latanoprost (XALATAN) 0.005 % ophthalmic solution Place 1 drop into both eyes at bedtime. 7.5 mL 3    MULTIVITAMIN TABS   OR 1 TABLET DAILY      phentermine 15 MG capsule Take 1 capsule (15 mg) by mouth every morning. 90 capsule 0    Pilocarpine HCl (VUITY) 1.25 % SOLN Apply 1 drop to eye daily 5 mL 11    tretinoin (RETIN-A) 0.05 % external cream Spread a pea size amount into affected area topically at bedtime.  Use sunscreen SPF>20. 45 g 3     No facility-administered encounter medications on file  as of 1/2/2025.             O:   NAD, WDWN, Alert & Oriented, Mood & Affect wnl, Vitals stable   Here today alone   LMP 10/19/2012    General appearance normal   Vitals stable   Alert, oriented and in no acute distress     Stuck on papules and brown macules on trunk and ext   Red papules on trunk  Brown papules and macules with regular pigment network and borders on torso and extremities   Brown stuck on papules on face and scalp   Hyperpigmented scar on left thigh and left shin         Eyes: Conjunctivae/lids:Normal     ENT: Lips normal    MSK:Normal    Cardiovascular: peripheral edema none    Pulm: Breathing Normal    Neuro/Psych: Orientation:Alert and Orientedx3 ; Mood/Affect:normal   A/P:  1. 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 I explained the need for monthly skin exams including and taught the patient how to do this. Patient was asked about new or changing moles . I discussed with patient signs and symptoms that could arise in the setting of recurrent locoregional or metastatic disease. In addition, the need to undergo every 4 month dermatologic full skin survey and evaluation given that patients with a diagnosis of melanoma are at risk of recurrence (local and distant) and of subsequent de andreia melanoma.    2. Seborrheic keratosis, lentigo, angioma, benign nevi, history of BCC  It was a pleasure speaking to Missy Kirkpatrick today. Monitor brown macule on right superior shin, 0.2  cm in size, picture taken.   .BENIGN LESIONS DISCUSSED WITH PATIENT:  I discussed the specifics of tumor, prognosis, and genetics of benign lesions.  I explained that treatment of these lesions would be purely cosmetic and not medically neccessary.  I discussed with patient different removal options including excision, cautery and /or laser.       Nature and genetics of benign skin lesions dicussed with patient.  Signs and Symptoms of skin cancer discussed with patient.  ABCDEs of melanoma reviewed with patient.  Patient encouraged to perform monthly skin exams.  UV precautions reviewed with patient.  Risks of non-melanoma skin cancer discussed with patient   Return to clinic in 6 months.

## 2025-01-22 ENCOUNTER — MYC MEDICAL ADVICE (OUTPATIENT)
Dept: FAMILY MEDICINE | Facility: CLINIC | Age: 65
End: 2025-01-22
Payer: COMMERCIAL

## 2025-01-22 ENCOUNTER — PATIENT OUTREACH (OUTPATIENT)
Dept: ONCOLOGY | Facility: CLINIC | Age: 65
End: 2025-01-22
Payer: COMMERCIAL

## 2025-01-22 DIAGNOSIS — Z80.0 FAMILY HISTORY OF MALIGNANT NEOPLASM OF PANCREAS: ICD-10-CM

## 2025-01-22 DIAGNOSIS — E28.39 ESTROGEN DEFICIENCY: Primary | ICD-10-CM

## 2025-01-22 NOTE — PROGRESS NOTES
New Patient Oncology Nurse Navigator Note     Referring provider: Shell Osuna MD      [facility]      Referred to (specialty:) Genetic Counseling and Cancer Risk Management     Requested provider (if applicable): NA     Date Referral Received: 2025     Evaluation for:  Z80.0 (ICD-10-CM) - Family history of malignant neoplasm of pancreas  Pt with 2 brothers that  from pancreatic cancer.      amily History  Pedigree Relation Problem Comments   Mother - Jerilyn ( at age 60) Cataracts    Cerebrovascular Disease    Diabetes blindness   Eye Disorder glaucoma   Eye Surgery cataracts   Heart Disease    Hypertension       Father ( at age 56)    Maternal Grandmother ()    Maternal Grandfather ()    Paternal Grandmother ()    Paternal Grandfather ()    Brother - Erick () Blood Disease    Diabetes    Lung Cancer    Other Cancer Lung Cancer      Brother - Sami (Alive) Blood Disease    Obesity Bariatric surgery      Brother - Israel (Alive) Blood Disease    Colon Cancer    Diabetes    Other Cancer Pancreatic Cancer   Rectal Cancer       Brother - Dev () Blood Disease    Hypertension    Other Cancer Pancreatic Cancer   Pancreatic Cancer       Son - Thomas (Alive) Blood Disease (Age: 27) blood clots   Osteoporosis 2020      Son - Louie (Alive)    Daughter - Samira (Alive)    No family hx of Glaucoma    Macular Degeneration    Melanoma           Payor: [place] [employer] / Plan: [facility] MEDICARE ADVANTAGE / Product Type: HMO /     2025  Referral received and reviewed.   Sent to NPS to schedule.     Pennie BATESN, RN, OCN  Oncology Nurse Navigator   [facility]  Cancer Care Service Line   New Patient Hem/Onc Scheduling / Referrals: [phone] (fax: [phone] )

## 2025-01-23 ENCOUNTER — PATIENT OUTREACH (OUTPATIENT)
Dept: CARE COORDINATION | Facility: CLINIC | Age: 65
End: 2025-01-23
Payer: COMMERCIAL

## 2025-02-13 ENCOUNTER — OFFICE VISIT (OUTPATIENT)
Dept: OPTOMETRY | Facility: CLINIC | Age: 65
End: 2025-02-13
Payer: COMMERCIAL

## 2025-02-13 DIAGNOSIS — E66.09 CLASS 2 OBESITY DUE TO EXCESS CALORIES WITHOUT SERIOUS COMORBIDITY WITH BODY MASS INDEX (BMI) OF 35.0 TO 35.9 IN ADULT: ICD-10-CM

## 2025-02-13 DIAGNOSIS — E66.812 CLASS 2 OBESITY DUE TO EXCESS CALORIES WITHOUT SERIOUS COMORBIDITY WITH BODY MASS INDEX (BMI) OF 35.0 TO 35.9 IN ADULT: ICD-10-CM

## 2025-02-13 DIAGNOSIS — H40.003 GLAUCOMA SUSPECT OF BOTH EYES: Primary | ICD-10-CM

## 2025-02-13 RX ORDER — PHENTERMINE HYDROCHLORIDE 15 MG/1
15 CAPSULE ORAL EVERY MORNING
Qty: 90 CAPSULE | Refills: 0 | Status: SHIPPED | OUTPATIENT
Start: 2025-02-13

## 2025-02-13 ASSESSMENT — SLIT LAMP EXAM - LIDS
COMMENTS: NORMAL
COMMENTS: NORMAL

## 2025-02-13 ASSESSMENT — EXTERNAL EXAM - LEFT EYE: OS_EXAM: NORMAL

## 2025-02-13 ASSESSMENT — CUP TO DISC RATIO
OD_RATIO: 0.4
OS_RATIO: 0.4

## 2025-02-13 ASSESSMENT — VISUAL ACUITY
OS_SC: 20/30
OD_SC: 30+2
METHOD: SNELLEN - LINEAR

## 2025-02-13 ASSESSMENT — EXTERNAL EXAM - RIGHT EYE: OD_EXAM: NORMAL

## 2025-02-13 ASSESSMENT — TONOMETRY
OD_IOP_MMHG: 9
IOP_METHOD: APPLANATION
OS_IOP_MMHG: 10

## 2025-02-13 NOTE — LETTER
2/13/2025      Missy Kirkpatrick  [address] [facility] [address]      Dear Colleague,    Thank you for referring your patient, Missy Kirkpatrick, to the [facility]. Please see a copy of my visit note below.    Chief Complaint   Patient presents with     Dilation     Patient had CE 12/18/24        Dilation to complete 12/18/2024    [facility] Optometric Assistant     Medical, surgical and family histories reviewed and updated 2/13/2025.      OBJECTIVE: See Ophthalmology exam    ASSESSMENT:    ICD-10-CM    1. Glaucoma suspect of both eyes- on drops  H40.003          PLAN:    Patient Instructions   Return to Dr Martinez for care of glaucoma     Selena Yousif, KENRICK    [facility]  [address] [place], [place] [id number] [phone]                      Again, thank you for allowing me to participate in the care of your patient.        Sincerely,        Selena Yousif, OD    Electronically signed

## 2025-02-13 NOTE — PROGRESS NOTES
Chief Complaint   Patient presents with    Dilation     Patient had CE 12/18/24        Dilation to complete 12/18/2024    Edwige Holman Optometric Assistant     Medical, surgical and family histories reviewed and updated 2/13/2025.      OBJECTIVE: See Ophthalmology exam    ASSESSMENT:    ICD-10-CM    1. Glaucoma suspect of both eyes- on drops  H40.003          PLAN:    Patient Instructions   Return to Dr Martinez for care of glaucoma     Selena Yousif, OD    [facility]  [address] [place], [place] [id number] [phone]

## 2025-02-13 NOTE — PATIENT INSTRUCTIONS
Return to Dr Martinez for care of glaucoma     Selena Yousif OD    [facility]  [address] [place] [address] [phone]

## 2025-02-20 ENCOUNTER — TELEPHONE (OUTPATIENT)
Dept: DERMATOLOGY | Facility: CLINIC | Age: 65
End: 2025-02-20
Payer: COMMERCIAL

## 2025-02-20 ENCOUNTER — MYC MEDICAL ADVICE (OUTPATIENT)
Dept: FAMILY MEDICINE | Facility: CLINIC | Age: 65
End: 2025-02-20
Payer: COMMERCIAL

## 2025-02-20 NOTE — TELEPHONE ENCOUNTER
Left message on answering machine for patient that we would add her to Ruthann Julioson's schedule as soon as it becomes available.  Thank you,    Selena CHEWRN BSN  [facility]- [phone]

## 2025-02-20 NOTE — TELEPHONE ENCOUNTER
[facility] Call Center    Phone Message    May a detailed message be left on voicemail: yes     Reason for Call: Other: Other:   Patient got notice to reschedule 5/29 appt David - Patient is supposed to go every 6 months - writer offered 1st avail 9/23 -patient wants to go in around same time as cancelled appt -  please call back [phone] Thank you    Action Taken: Other: [place] DER[facility]    Travel Screening: Not Applicable     Date of Service:

## 2025-03-17 ENCOUNTER — HOSPITAL ENCOUNTER (OUTPATIENT)
Dept: BONE DENSITY | Facility: CLINIC | Age: 65
Discharge: HOME | End: 2025-03-17
Attending: FAMILY MEDICINE | Admitting: FAMILY MEDICINE
Payer: COMMERCIAL

## 2025-03-17 DIAGNOSIS — E28.39 ESTROGEN DEFICIENCY: ICD-10-CM

## 2025-03-17 PROCEDURE — 77080 DXA BONE DENSITY AXIAL: CPT

## 2025-03-17 PROCEDURE — 77081 DXA BONE DENSITY APPENDICULR: CPT

## 2025-05-27 SDOH — HEALTH STABILITY: PHYSICAL HEALTH: ON AVERAGE, HOW MANY MINUTES DO YOU ENGAGE IN EXERCISE AT THIS LEVEL?: 60 MIN

## 2025-05-27 SDOH — HEALTH STABILITY: PHYSICAL HEALTH: ON AVERAGE, HOW MANY DAYS PER WEEK DO YOU ENGAGE IN MODERATE TO STRENUOUS EXERCISE (LIKE A BRISK WALK)?: 5 DAYS

## 2025-05-27 ASSESSMENT — SOCIAL DETERMINANTS OF HEALTH (SDOH): HOW OFTEN DO YOU GET TOGETHER WITH FRIENDS OR RELATIVES?: MORE THAN THREE TIMES A WEEK

## 2025-05-28 ENCOUNTER — OFFICE VISIT (OUTPATIENT)
Dept: FAMILY MEDICINE | Facility: CLINIC | Age: 65
End: 2025-05-28
Attending: FAMILY MEDICINE
Payer: COMMERCIAL

## 2025-05-28 VITALS
OXYGEN SATURATION: 99 % | TEMPERATURE: 97.6 F | RESPIRATION RATE: 20 BRPM | HEIGHT: 67 IN | SYSTOLIC BLOOD PRESSURE: 123 MMHG | HEART RATE: 75 BPM | DIASTOLIC BLOOD PRESSURE: 81 MMHG | BODY MASS INDEX: 35.03 KG/M2 | WEIGHT: 223.2 LBS

## 2025-05-28 DIAGNOSIS — E66.09 CLASS 2 OBESITY DUE TO EXCESS CALORIES WITHOUT SERIOUS COMORBIDITY WITH BODY MASS INDEX (BMI) OF 35.0 TO 35.9 IN ADULT: ICD-10-CM

## 2025-05-28 DIAGNOSIS — E66.812 CLASS 2 OBESITY DUE TO EXCESS CALORIES WITHOUT SERIOUS COMORBIDITY WITH BODY MASS INDEX (BMI) OF 35.0 TO 35.9 IN ADULT: ICD-10-CM

## 2025-05-28 DIAGNOSIS — D03.72 MELANOMA IN SITU OF LEFT LOWER LEG (H): ICD-10-CM

## 2025-05-28 DIAGNOSIS — Z13.6 CARDIOVASCULAR SCREENING; LDL GOAL LESS THAN 160: ICD-10-CM

## 2025-05-28 DIAGNOSIS — E89.0 S/P PARTIAL THYROIDECTOMY: ICD-10-CM

## 2025-05-28 DIAGNOSIS — Z12.31 ENCOUNTER FOR SCREENING MAMMOGRAM FOR BREAST CANCER: ICD-10-CM

## 2025-05-28 DIAGNOSIS — Z00.00 ENCOUNTER FOR MEDICARE ANNUAL WELLNESS EXAM: Primary | ICD-10-CM

## 2025-05-28 RX ORDER — PHENTERMINE HYDROCHLORIDE 15 MG/1
15 CAPSULE ORAL EVERY MORNING
Qty: 90 CAPSULE | Refills: 0 | Status: SHIPPED | OUTPATIENT
Start: 2025-05-28

## 2025-05-28 ASSESSMENT — PAIN SCALES - GENERAL: PAINLEVEL_OUTOF10: NO PAIN (0)

## 2025-05-28 NOTE — PATIENT INSTRUCTIONS
Patient Education   Preventive Care Advice   This is general advice given by our system to help you stay healthy. However, your care team may have specific advice just for you. Please talk to your care team about your preventive care needs.  Nutrition  Eat 5 or more servings of fruits and vegetables each day.  Try wheat bread, brown rice and whole grain pasta (instead of white bread, rice, and pasta).  Get enough calcium and vitamin D. Check the label on foods and aim for 100% of the RDA (recommended daily allowance).  Lifestyle  Exercise at least 150 minutes each week  (30 minutes a day, 5 days a week).  Do muscle strengthening activities 2 days a week. These help control your weight and prevent disease.  No smoking.  Wear sunscreen to prevent skin cancer.  Have a dental exam and cleaning every 6 months.  Yearly exams  See your health care team every year to talk about:  Any changes in your health.  Any medicines your care team has prescribed.  Preventive care, family planning, and ways to prevent chronic diseases.  Shots (vaccines)   HPV shots (up to age 26), if you've never had them before.  Hepatitis B shots (up to age 59), if you've never had them before.  COVID-19 shot: Get this shot when it's due.  Flu shot: Get a flu shot every year.  Tetanus shot: Get a tetanus shot every 10 years.  Pneumococcal, hepatitis A, and RSV shots: Ask your care team if you need these based on your risk.  Shingles shot (for age 50 and up)  General health tests  Diabetes screening:  Starting at age 35, Get screened for diabetes at least every 3 years.  If you are younger than age 35, ask your care team if you should be screened for diabetes.  Cholesterol test: At age 39, start having a cholesterol test every 5 years, or more often if advised.  Bone density scan (DEXA): At age 50, ask your care team if you should have this scan for osteoporosis (brittle bones).  Hepatitis C: Get tested at least once in your life.  STIs (sexually  transmitted infections)  Before age 24: Ask your care team if you should be screened for STIs.  After age 24: Get screened for STIs if you're at risk. You are at risk for STIs (including HIV) if:  You are sexually active with more than one person.  You don't use condoms every time.  You or a partner was diagnosed with a sexually transmitted infection.  If you are at risk for HIV, ask about PrEP medicine to prevent HIV.  Get tested for HIV at least once in your life, whether you are at risk for HIV or not.  Cancer screening tests  Cervical cancer screening: If you have a cervix, begin getting regular cervical cancer screening tests starting at age 21.  Breast cancer scan (mammogram): If you've ever had breasts, begin having regular mammograms starting at age 40. This is a scan to check for breast cancer.  Colon cancer screening: It is important to start screening for colon cancer at age 45.  Have a colonoscopy test every 10 years (or more often if you're at risk) Or, ask your provider about stool tests like a FIT test every year or Cologuard test every 3 years.  To learn more about your testing options, visit:   .  For help making a decision, visit:   https://bit.ly/dn84245.  Prostate cancer screening test: If you have a prostate, ask your care team if a prostate cancer screening test (PSA) at age 55 is right for you.  Lung cancer screening: If you are a current or former smoker ages 50 to 80, ask your care team if ongoing lung cancer screenings are right for you.  For informational purposes only. Not to replace the advice of your health care provider. Copyright   2023 [facility]. All rights reserved. Clinically reviewed by the [facility]. [employer] 212527 - REV 01/24.  Substance Use Disorder: Care Instructions  Overview     You can improve your life and health by stopping your use of alcohol or drugs. When you don't drink or use drugs, you may feel and sleep better. You may  get along better with your family, friends, and coworkers. There are medicines and programs that can help with substance use disorder.  How can you care for yourself at home?  Here are some ways to help you stay sober and prevent relapse.  If you have been given medicine to help keep you sober or reduce your cravings, be sure to take it exactly as prescribed.  Talk to your doctor about programs that can help you stop using drugs or drinking alcohol.  Do not keep alcohol or drugs in your home.  Plan ahead. Think about what you'll say if other people ask you to drink or use drugs. Try not to spend time with people who drink or use drugs.  Use the time and money spent on drinking or drugs to do something that's important to you.  Preventing a relapse  Have a plan to deal with relapse. Learn to recognize changes in your thinking that lead you to drink or use drugs. Get help before you start to drink or use drugs again.  Try to stay away from situations, friends, or places that may lead you to drink or use drugs.  If you feel the need to drink alcohol or use drugs again, seek help right away. Call a trusted friend or family member. Some people get support from organizations such as Narcotics Anonymous or [employer] or from treatment facilities.  If you relapse, get help as soon as you can. Some people make a plan with another person that outlines what they want that person to do for them if they relapse. The plan usually includes how to handle the relapse and who to notify in case of relapse.  Don't give up. Remember that a relapse doesn't mean that you have failed. Use the experience to learn the triggers that lead you to drink or use drugs. Then quit again. Recovery is a lifelong process. Many people have several relapses before they are able to quit for good.  Follow-up care is a key part of your treatment and safety. Be sure to make and go to all appointments, and call your doctor if you are having problems. It's  "also a good idea to know your test results and keep a list of the medicines you take.  When should you call for help?   Call 911  anytime you think you may need emergency care. For example, call if you or someone else:    Has overdosed or has withdrawal signs. Be sure to tell the emergency workers that you are or someone else is using or trying to quit using drugs. Overdose or withdrawal signs may include:  Losing consciousness.  Seizure.  Seeing or hearing things that aren't there (hallucinations).     Is thinking or talking about suicide or harming others.   Where to get help 24 hours a day, 7 days a week   If you or someone you know talks about suicide, self-harm, a mental health crisis, a substance use crisis, or any other kind of emotional distress, get help right away. You can:    Call the Suicide and Crisis Lifeline at 988.     Call 9-630-207-TALK (1-357.562.3910).     Text HOME to 462206 to access the Crisis Text Line.   Consider saving these numbers in your phone.  Go to [employer]bite for more information or to chat online.  Call your doctor now or seek immediate medical care if:    You are having withdrawal symptoms. These may include nausea or vomiting, sweating, shakiness, and anxiety.   Watch closely for changes in your health, and be sure to contact your doctor if:    You have a relapse.     You need more help or support to stop.   Where can you learn more?  Go to https://www.[employer].net/patiented  Enter H573 in the search box to learn more about \"Substance Use Disorder: Care Instructions.\"  Current as of: August 20, 2024  Content Version: 14.4    9751-9192 [employer].   Care instructions adapted under license by your healthcare professional. If you have questions about a medical condition or this instruction, always ask your healthcare professional. [employer] disclaims any warranty or liability for your use of this information.       "

## 2025-05-28 NOTE — PROGRESS NOTES
"Preventive Care Visit  [facility]  Shell Osuna MD, Family Medicine  May 28, 2025      Assessment & Plan     (Z00.00) Encounter for Medicare annual wellness exam  (primary encounter diagnosis)  Comment: preventive needs reviewed   Plan: Glucose        see orders in Epic.     (D03.72) Melanoma in situ of left lower leg (H)  Comment: no new skin lesions noted  Plan: OFFICE/OUTPT VISIT,EST,LEVL III        Follow-up with derm as scheduled    (E89.0) S/P partial thyroidectomy  Comment: due for US and follow-up   Plan: TSH WITH FREE T4 REFLEX, US Thyroid, Adult         Endocrinology  Referral, OFFICE/OUTPT         VISIT,EST,LEVL III        Referral placed, US ordered    (E66.812,  E66.09,  Z68.35) Class 2 obesity due to excess calories without serious comorbidity with body mass index (BMI) of 35.0 to 35.9 in adult  Comment: trending down  Plan: phentermine 15 MG capsule, OFFICE/OUTPT         VISIT,EST,LEVL III        Restart phentermine, ok to increase to 30 mg daily after 4 weeks if no results    (Z13.6) CARDIOVASCULAR SCREENING; LDL GOAL LESS THAN 160  Comment: due  Plan: Lipid panel reflex to direct LDL Fasting            (Z12.31) Encounter for screening mammogram for breast cancer  Comment: due  Plan: MA Screen Bilateral w/[place]              The longitudinal plan of care for the diagnosis(es)/condition(s) as documented were addressed during this visit. Due to the added complexity in care, I will continue to support Missy in the subsequent management and with ongoing continuity of care.      BMI  Estimated body mass index is 35.49 kg/m  as calculated from the following:    Height as of this encounter: 1.689 m (5' 6.5\").    Weight as of this encounter: 101.2 kg (223 lb 3.2 oz).   Weight management plan: Discussed healthy diet and exercise guidelines restart phentermine    Counseling  Appropriate preventive services were addressed with this patient via screening, questionnaire, or " discussion as appropriate for fall prevention, nutrition, physical activity, Tobacco-use cessation, social engagement, weight loss and cognition.  Checklist reviewing preventive services available has been given to the patient.  Reviewed patient's diet, addressing concerns and/or questions.           Sugar Louis is a 64 year old, presenting for the following:  Wellness Visit        5/28/2025    11:13 AM   Additional Questions   Roomed by Yasmany HICKMAN  Traveling to [place] in Feb 2026, wondering about vaccines.  Has started [employer].     Overdue for follow-up with endocrinology for thyroid.    Stopped phentermine, wondering if she should restart.  Frustrated she isn't seeing the same results her spouse is on topiramate.             Advance Care Planning    Patient states has Health Care Directive and will send to [facility].        5/27/2025   General Health   How would you rate your overall physical health? Good   Feel stress (tense, anxious, or unable to sleep) Only a little   (!) STRESS CONCERN      5/27/2025   Nutrition   Diet: Regular (no restrictions)         5/27/2025   Exercise   Days per week of moderate/strenous exercise 5 days   Average minutes spent exercising at this level 60 min         5/27/2025   Social Factors   Frequency of gathering with friends or relatives More than three times a week   Worry food won't last until get money to buy more No   Food not last or not have enough money for food? No   Do you have housing? (Housing is defined as stable permanent housing and does not include staying outside in a car, in a tent, in an abandoned building, in an overnight shelter, or couch-surfing.) Yes   Are you worried about losing your housing? No   Lack of transportation? No   Unable to get utilities (heat,electricity)? No         5/28/2025   Fall Risk   Gait Speed Test (Document in seconds) 4.5   Gait Speed Test Interpretation Less than or equal to 5.00 seconds - PASS           5/27/2025   Activities of Daily Living- Home Safety   Needs help with the following daily activites None of the above   Safety concerns in the home None of the above         5/27/2025   Dental   Dentist two times every year? Yes         5/27/2025   Hearing Screening   Hearing concerns? None of the above         5/27/2025   Driving Risk Screening   Patient/family members have concerns about driving No         5/27/2025   General Alertness/Fatigue Screening   Have you been more tired than usual lately? No         5/27/2025   Urinary Incontinence Screening   Bothered by leaking urine in past 6 months No         Today's PHQ-2 Score:       5/27/2025    12:08 PM   PHQ-2 ( 1999 Pfizer)   Q1: Little interest or pleasure in doing things 0   Q2: Feeling down, depressed or hopeless 0   PHQ-2 Score 0    Q1: Little interest or pleasure in doing things Not at all   Q2: Feeling down, depressed or hopeless Not at all   PHQ-2 Score 0       Patient-reported           5/27/2025   Substance Use   Alcohol more than 3/day or more than 7/wk No   Do you have a current opioid prescription? No   How severe/bad is pain from 1 to 10? 0/10 (No Pain)   Do you use any other substances recreationally? No    (!) PRESCRIPTION DRUGS       Multiple values from one day are sorted in reverse-chronological order     Social History     Tobacco Use    Smoking status: Never    Smokeless tobacco: Never    Tobacco comments:     Nonsmoking household   Vaping Use    Vaping status: Never Used   Substance Use Topics    Alcohol use: Yes     Comment: Occasionally    Drug use: No           8/30/2024   LAST FHS-7 RESULTS   1st degree relative breast or ovarian cancer No   Any relative bilateral breast cancer No   Any male have breast cancer No   Any ONE woman have BOTH breast AND ovarian cancer No   Any woman with breast cancer before 50yrs No   2 or more relatives with breast AND/OR ovarian cancer No   2 or more relatives with breast AND/OR bowel cancer No         Mammogram Screening - Mammogram every 1-2 years updated in Health Maintenance based on mutual decision making    G 3 P 3   Patient's last menstrual period was 10/19/2012.     Fasting: Yes    Td: tdap 2022       Flu: 10/2024      Covid: 2021      Shingrix: done      PPV: done       RSV: reviewed               Cholesterol:   Lab Results   Component Value Date    CHOL 219 2024    CHOL 212 03/10/2020     Lab Results   Component Value Date    HDL 62 2024    HDL 68 03/10/2020     Lab Results   Component Value Date    LDL 80 2024     03/10/2020     Lab Results   Component Value Date    TRIG 383 2024    TRIG 74 03/10/2020     Lab Results   Component Value Date    CHOLHDLRATIO 3.2 10/21/2015         MM2024  Dexa:  3/2025 q5y     Flex/colo: 2023 q3y CG      Seat Belt: Yes    Sunscreen use: Yes   Calcium Intake: adeq  Health Care Directive: Yes   Sexually Active: Yes     Current contraception: hysterectomy  History of abnormal Pap smear: No  Family history of colon/breast/ovarian cancer: No  Regular self breast exam: Yes  History of abnormal mammogram: No          History of abnormal Pap smear: No - age 65 or older with adequate negative prior screening test results (3 consecutive negative cytology results, 2 consecutive negative cotesting results, or 2 consecutive negative HrHPV test results within 10 years, with the most recent test occurring within the recommended screening interval for the test used)        2012    10:30 AM 10/2/2009    12:00 AM   PAP / HPV   PAP (Historical) OTHER-NIL, See Result  OTHER-NIL EM>40      ASCVD Risk   The 10-year ASCVD risk score (Abraham BOWER, et al., 2019) is: 5.2%    Values used to calculate the score:      Age: 64 years      Sex: Female      Is Non- : No      Diabetic: No      Tobacco smoker: No      Systolic Blood Pressure: 123 mmHg      Is BP treated: No      HDL Cholesterol: 62 mg/dL      Total Cholesterol:  219 mg/dL            Reviewed and updated as needed this visit by Provider   Tobacco  Allergies  Meds  Problems  Med Hx  Surg Hx  Fam Hx            Labs reviewed in EPIC  BP Readings from Last 3 Encounters:   05/28/25 123/81   05/28/24 118/80   01/24/23 129/81    Wt Readings from Last 3 Encounters:   05/28/25 101.2 kg (223 lb 3.2 oz)   05/28/24 103.9 kg (229 lb)   09/26/23 99.8 kg (220 lb)                  Patient Active Problem List   Diagnosis    CARDIOVASCULAR SCREENING; LDL GOAL LESS THAN 160    Female stress incontinence    Factor 5 Leiden mutation, heterozygous    Multiple thyroid nodules    S/P partial thyroidectomy    Right rotator cuff tendonitis    Complete tear of right rotator cuff     Past Surgical History:   Procedure Laterality Date    ABDOMEN SURGERY  December 2012    Hysterectomy    APPENDECTOMY      BIOPSY  November 2012    utererus    COLONOSCOPY  October 2012    ENT SURGERY  February 1994    Thyroid issue    ORTHOPEDIC SURGERY  01/1976    [place]    TUBAL LIGATION      Z ANESTH,CLEFT PALATE REPAIR      Z ANESTH,ELBOW,NOS  01/76    ZC REMOVE BENIGN THYROID LESION  01/2002    nodule removed     [facility] TOTAL ABDOM HYSTERECTOMY  12/14/2012    pathology benign, no further paps needed       Social History     Tobacco Use    Smoking status: Never    Smokeless tobacco: Never    Tobacco comments:     Nonsmoking household   Substance Use Topics    Alcohol use: Yes     Comment: Occasionally     Family History   Problem Relation Age of Onset    Cerebrovascular Disease Mother     Eye Disorder Mother         glaucoma    Heart Disease Mother     Diabetes Mother         blindness    Hypertension Mother     Eye Surgery Mother         cataracts    Cataracts Mother     Other Cancer Brother         Lung Cancer    Diabetes Brother     Lung Cancer Brother     Blood Disease Brother     Obesity Brother         Bariatric surgery    Blood Disease Brother     Diabetes Brother     Blood Disease Brother      Colon Cancer Brother     Rectal Cancer Brother     Other Cancer Brother         Pancreatic Cancer    Hypertension Brother     Other Cancer Brother         Pancreatic Cancer    Pancreatic Cancer Brother     Blood Disease Brother     Blood Disease Son 27        blood clots    Osteoporosis Son         July 2020    Diabetes Brother     Other Cancer Brother         Lung Cancer    Obesity Brother         Bariatric surgery    Melanoma No family hx of     Glaucoma No family hx of     Macular Degeneration No family hx of          Current Outpatient Medications   Medication Sig Dispense Refill    latanoprost (XALATAN) 0.005 % ophthalmic solution Place 1 drop into both eyes at bedtime. 7.5 mL 3    MULTIVITAMIN TABS   OR 1 TABLET DAILY      Pilocarpine HCl (VUITY) 1.25 % SOLN Apply 1 drop to eye daily. 5 mL 11    tretinoin (RETIN-A) 0.05 % external cream Spread a pea size amount into affected area topically at bedtime.  Use sunscreen SPF>20. 45 g 3    phentermine 15 MG capsule Take 1 capsule by mouth in the morning (Patient not taking: Reported on 5/28/2025) 90 capsule 0     Current providers sharing in care for this patient include:  Patient Care Team:  Shell Osuna MD as PCP - General (Family Practice)  Shell Osuna MD as Assigned PCP  Brandy Hernandez PA-C as Physician Assistant (Dermatology)  Carley Zamorano MD as MD (Endocrinology, Diabetes, and Metabolism)  Selena Yousif OD as Assigned Surgical Provider  Anisa Reynaga PA-C as Assigned Dermatology Provider    The following health maintenance items are reviewed in Epic and correct as of today:  Health Maintenance   Topic Date Due    ANNUAL REVIEW OF HM ORDERS  Never done    COVID-19 VACCINE (3 - 2024-25 season) 09/01/2024    TSH W/FREE T4 REFLEX  05/28/2025    COLORECTAL CANCER SCREENING  01/30/2026    MEDICARE ANNUAL WELLNESS VISIT  05/28/2026    FALL RISK ASSESSMENT  05/28/2026    MAMMO SCREENING  08/30/2026    DIABETES SCREENING   "05/28/2027    LIPID  05/28/2029    ADVANCE CARE PLANNING  05/28/2029    DEXA  03/17/2030    DTAP/TDAP/TD VACCINE (4 - Td or Tdap) 01/10/2032    RSV VACCINE (1 - 1-dose 75+ series) 09/16/2034    HEPATITIS C SCREENING  Completed    PHQ-2 (once per calendar year)  Completed    INFLUENZA VACCINE  Completed    PNEUMOCOCCAL VACCINE 50+ YEARS  Completed    ZOSTER VACCINE  Completed    HIV SCREENING  Addressed    HPV VACCINE  Aged Out    MENINGITIS VACCINE  Aged Out    PAP  Discontinued         Review of Systems  Constitutional, neuro, ENT, endocrine, pulmonary, cardiac, gastrointestinal, genitourinary, musculoskeletal, integument and psychiatric systems are negative, except as otherwise noted.     Objective    Exam  /81   Pulse 75   Temp 97.6  F (36.4  C) (Oral)   Resp 20   Ht 1.689 m (5' 6.5\")   Wt 101.2 kg (223 lb 3.2 oz)   LMP 10/19/2012   SpO2 99%   BMI 35.49 kg/m     Estimated body mass index is 35.49 kg/m  as calculated from the following:    Height as of this encounter: 1.689 m (5' 6.5\").    Weight as of this encounter: 101.2 kg (223 lb 3.2 oz).    Physical Exam  GENERAL: alert and no distress  EYES: Eyes grossly normal to inspection, PERRL and conjunctivae and sclerae normal  HENT: ear canals and TM's normal, nose and mouth without ulcers or lesions  NECK: no adenopathy, no asymmetry, masses, or scars  RESP: lungs clear to auscultation - no rales, rhonchi or wheezes  BREAST: normal without masses, tenderness or nipple discharge and no palpable axillary masses or adenopathy  CV: regular rate and rhythm, normal S1 S2, no S3 or S4, no murmur, click or rub, no peripheral edema  ABDOMEN: soft, nontender, no hepatosplenomegaly, no masses and bowel sounds normal  MS: no gross musculoskeletal defects noted, no edema  SKIN: no suspicious lesions or rashes  NEURO: Normal strength and tone, mentation intact and speech normal  PSYCH: mentation appears normal, affect normal/bright  Gait and balance assessed per " Gait Speed Test.  Result as above.  Vision Screen  Vision Screen Results: (!) REFER  No Corrective Lenses, PLUS LENS REQUIRED: Pass      Normal cognition based on my direct observation during interview and exam.     Signed Electronically by: Shell Osuna MD

## 2025-05-29 ENCOUNTER — PATIENT OUTREACH (OUTPATIENT)
Dept: CARE COORDINATION | Facility: CLINIC | Age: 65
End: 2025-05-29

## 2025-05-29 ENCOUNTER — OFFICE VISIT (OUTPATIENT)
Dept: DERMATOLOGY | Facility: CLINIC | Age: 65
End: 2025-05-29
Payer: COMMERCIAL

## 2025-05-29 ENCOUNTER — MYC MEDICAL ADVICE (OUTPATIENT)
Dept: FAMILY MEDICINE | Facility: CLINIC | Age: 65
End: 2025-05-29

## 2025-05-29 DIAGNOSIS — Z12.83 SKIN CANCER SCREENING: ICD-10-CM

## 2025-05-29 DIAGNOSIS — D18.01 CHERRY ANGIOMA: ICD-10-CM

## 2025-05-29 DIAGNOSIS — Z85.820 PERSONAL HISTORY OF MALIGNANT MELANOMA: ICD-10-CM

## 2025-05-29 DIAGNOSIS — Z85.828 HISTORY OF NONMELANOMA SKIN CANCER: ICD-10-CM

## 2025-05-29 DIAGNOSIS — D22.9 MULTIPLE BENIGN NEVI: Primary | ICD-10-CM

## 2025-05-29 DIAGNOSIS — L82.1 SEBORRHEIC KERATOSES: ICD-10-CM

## 2025-05-29 LAB
CHOLEST SERPL-MCNC: 239 MG/DL
FASTING STATUS PATIENT QL REPORTED: NO
FASTING STATUS PATIENT QL REPORTED: NO
GLUCOSE SERPL-MCNC: 93 MG/DL (ref 70–99)
HDLC SERPL-MCNC: 72 MG/DL
LDLC SERPL CALC-MCNC: 141 MG/DL
NONHDLC SERPL-MCNC: 167 MG/DL
TRIGL SERPL-MCNC: 132 MG/DL
TSH SERPL DL<=0.005 MIU/L-ACNC: 0.93 UIU/ML (ref 0.3–4.2)

## 2025-05-29 NOTE — LETTER
5/29/2025      Missy Kirkpatrick  [address]  [facility] [address]      Dear Colleague,    Thank you for referring your patient, Missy Kirkpatrick, to the [facility]. Please see a copy of my visit note below.    [facility] Dermatology Note  Encounter Date: May 29, 2025  Office Visit     Reviewed patients past medical history and pertinent chart review prior to patients visit today.     Dermatology Problem List:  Last skin check: 5/29/25    1.  Melanoma in situ, left medial shin, status post Mohs 6/19/2024  2.  Junctional dysplastic nevus with moderate to severe atypia, left thigh, biopsied 5/23/2424, status post excision 6/19/2024  3.  Basal cell carcinoma, left upper back, status post excision 3/27/2019    Family Hx: Negative for skin cancer    ___________________________________________    CC: Skin Check (6 MONTH )    HPI:  Ms. Missy Kirkpatrick is a(n) 64 year old female who presents today as a return patient for a full body skin cancer screening. The patient has a history of malignant melanoma in situ involving the left medial shin, status post Mohs 6/19/2024.  She also has a history of an atypical nevus and basal cell carcinoma as listed above.  Today, the patient would like to make sure the left medial shin and left thigh are healing well.  There is a small scab overlying the scar on the left medial shin, but the patient does states she recently bumped this area.  She has been diligent with photoprotection, but the patient is outside often.    Medications:  Current Outpatient Medications   Medication Sig Dispense Refill     latanoprost (XALATAN) 0.005 % ophthalmic solution Place 1 drop into both eyes at bedtime. 7.5 mL 3     MULTIVITAMIN TABS   OR 1 TABLET DAILY       phentermine 15 MG capsule Take 1 capsule (15 mg) by mouth every morning. 90 capsule 0     Pilocarpine HCl (VUITY) 1.25 % SOLN Apply 1 drop to eye daily. 5 mL 11     tretinoin (RETIN-A) 0.05  % external cream Spread a pea size amount into affected area topically at bedtime.  Use sunscreen SPF>20. 45 g 3     No current facility-administered medications for this visit.      Past Medical History:   Patient Active Problem List   Diagnosis     CARDIOVASCULAR SCREENING; LDL GOAL LESS THAN 160     Female stress incontinence     Factor 5 Leiden mutation, heterozygous     Multiple thyroid nodules     S/P partial thyroidectomy     Right rotator cuff tendonitis     Complete tear of right rotator cuff     Melanoma in situ of left lower leg (H)     Past Medical History:   Diagnosis Date     Arthritis      Basal cell carcinoma      Factor 5 Leiden mutation, heterozygous      Factor 5 Leiden mutation, heterozygous      Factor 5 Leiden mutation, heterozygous      Glaucoma (increased eye pressure)      Hyperlipidemia LDL goal < 100      Malignant melanoma (H)      Morbid obesity (H)      Thyroid disease 02/1994    Precancerous Thyroid - 1/2 removed       ___________________________________________     Physical Exam:  Vitals: LMP 10/19/2012   LYMPH: No cervical, axillary or inguinal lymphadenopathy.   SKIN: Total skin excluding the genitalia areas was performed. The exam included the scalp, face, neck, bilateral arms, chest, back, abdomen, bilateral legs, digits, mons pubis, buttocks, and nails.   - Westbrook III.  Skin is tan  - The left medial shin demonstrates a well healed scar with no nodularity, repigmentation, or pain to palpation; there is 1 excoriated small scabbed papule overlying the scar  - Multiple tan/brown macules and papules scattered throughout exam, consistent with benign nevi, many of which were examined under dermoscopy with no concerning features noted   - Scattered tan, homogenous macules on sun exposed skin, consistent with solar lentigines  - Scattered waxy, stuck on appearing papules and patches, consistent with seborrheic keratoses  - Several 1-2 mm red dome shaped symmetric papules, consistent  with cherry angiomas  -Left thigh, well healed scar with no signs of repigmentation or atypical nevus recurrence    - Left upper back, well healed scar with no signs of skin cancer recurrence    - Right third finger, flesh colored verrucous papule    - No other lesions of concern on areas examined    SYSTEMS REVIEW  The patient denies unintended weight loss, fevers, chills, night sweats, headache, cough, bloody sputum, shortness of breath, abdominal pain, nausea, numbness/weakness, swollen glands, bone pain, or changing pigmented skin lesions.    ____________________________________________    Assessment & Plan:   # Personal history of malignant melanoma in situ, left medial shin, status post Mohs 6/19/2024  # History of junctional dysplastic nevus with moderate to severe atypia, left thigh, biopsied 5/23/2424, status post excision 6/19/2024  # History of basal cell carcinoma, left upper back, status post excision 3/27/2019  # Nevi, trunk and extremities  # Solar lentigines  - No signs of recurrence. Continued observation recommended.   - Nevi demonstrate no concerning features on dermoscopy. We discussed the importance of self exams at home.   - ABCDEs: Counseled ABCDEs of melanoma: Asymmetry, Border (irregularity), Color (not uniform, changes in color), Diameter (greater than 6 mm which is about the size of a pencil eraser), and Evolving (any changes in preexisting moles).  - Sun protection: Counseled SPF 30+ sunscreen, UPF clothing, sun avoidance, tanning bed avoidance.    The patient has 1 small excoriated scabbed papule overlying the melanoma scar on the left medial shin.  However, she recalls bumping this area recently.  I do recommend close continued observation.  Patient should return for reassessment if this lesion does not completely improve and resolve in the next few weeks.    # Cherry angiomas  # Seborrheic keratoses  - We discussed the benign nature of the skin lesions. No treatment required. Continued  observation recommended. Follow up with any concerns or changes.      # Wart, right third finger  If bothersome, we discussed cryotherapy in office every 3 to 4 weeks or over-the-counter Mediplast (salicylic acid 40%) at home.  The patient does not express interest in an office treatment at this time.    Follow-up:  6 months for follow up full body skin exam, as needed for new or changing lesions or new concerns    All risks, benefits and alternatives were discussed with patient.  Patient is in agreement and understands the assessment and plan.  All questions were answered.    Ruthann Pierre PA-C  [facility] Dermat[facility]    CC Referred Self, MD  No address on file on close of this encounter.    Again, thank you for allowing me to participate in the care of your patient.        Sincerely,        Ruthann Pierre PA-C    Electronically signed

## 2025-05-29 NOTE — PROGRESS NOTES
[facility] Dermatology Note  Encounter Date: May 29, 2025  Office Visit     Reviewed patients past medical history and pertinent chart review prior to patients visit today.     Dermatology Problem List:  Last skin check: 5/29/25    1.  Melanoma in situ, left medial shin, status post Mohs 6/19/2024  2.  Junctional dysplastic nevus with moderate to severe atypia, left thigh, biopsied 5/23/2424, status post excision 6/19/2024  3.  Basal cell carcinoma, left upper back, status post excision 3/27/2019    Family Hx: Negative for skin cancer    ___________________________________________    CC: Skin Check (6 MONTH )    HPI:  Ms. Missy Kirkpatrick is a(n) 64 year old female who presents today as a return patient for a full body skin cancer screening. The patient has a history of malignant melanoma in situ involving the left medial shin, status post Mohs 6/19/2024.  She also has a history of an atypical nevus and basal cell carcinoma as listed above.  Today, the patient would like to make sure the left medial shin and left thigh are healing well.  There is a small scab overlying the scar on the left medial shin, but the patient does states she recently bumped this area.  She has been diligent with photoprotection, but the patient is outside often.    Medications:  Current Outpatient Medications   Medication Sig Dispense Refill    latanoprost (XALATAN) 0.005 % ophthalmic solution Place 1 drop into both eyes at bedtime. 7.5 mL 3    MULTIVITAMIN TABS   OR 1 TABLET DAILY      phentermine 15 MG capsule Take 1 capsule (15 mg) by mouth every morning. 90 capsule 0    Pilocarpine HCl (VUITY) 1.25 % SOLN Apply 1 drop to eye daily. 5 mL 11    tretinoin (RETIN-A) 0.05 % external cream Spread a pea size amount into affected area topically at bedtime.  Use sunscreen SPF>20. 45 g 3     No current facility-administered medications for this visit.      Past Medical History:   Patient Active Problem List   Diagnosis     CARDIOVASCULAR SCREENING; LDL GOAL LESS THAN 160    Female stress incontinence    Factor 5 Leiden mutation, heterozygous    Multiple thyroid nodules    S/P partial thyroidectomy    Right rotator cuff tendonitis    Complete tear of right rotator cuff    Melanoma in situ of left lower leg (H)     Past Medical History:   Diagnosis Date    Arthritis     Basal cell carcinoma     Factor 5 Leiden mutation, heterozygous     Factor 5 Leiden mutation, heterozygous     Factor 5 Leiden mutation, heterozygous     Glaucoma (increased eye pressure)     Hyperlipidemia LDL goal < 100     Malignant melanoma (H)     Morbid obesity (H)     Thyroid disease 02/1994    Precancerous Thyroid - 1/2 removed       ___________________________________________     Physical Exam:  Vitals: LMP 10/19/2012   LYMPH: No cervical, axillary or inguinal lymphadenopathy.   SKIN: Total skin excluding the genitalia areas was performed. The exam included the scalp, face, neck, bilateral arms, chest, back, abdomen, bilateral legs, digits, mons pubis, buttocks, and nails.   - Westbrook III.  Skin is tan  - The left medial shin demonstrates a well healed scar with no nodularity, repigmentation, or pain to palpation; there is 1 excoriated small scabbed papule overlying the scar  - Multiple tan/brown macules and papules scattered throughout exam, consistent with benign nevi, many of which were examined under dermoscopy with no concerning features noted   - Scattered tan, homogenous macules on sun exposed skin, consistent with solar lentigines  - Scattered waxy, stuck on appearing papules and patches, consistent with seborrheic keratoses  - Several 1-2 mm red dome shaped symmetric papules, consistent with cherry angiomas  -Left thigh, well healed scar with no signs of repigmentation or atypical nevus recurrence    - Left upper back, well healed scar with no signs of skin cancer recurrence    - Right third finger, flesh colored verrucous papule    - No other  lesions of concern on areas examined    SYSTEMS REVIEW  The patient denies unintended weight loss, fevers, chills, night sweats, headache, cough, bloody sputum, shortness of breath, abdominal pain, nausea, numbness/weakness, swollen glands, bone pain, or changing pigmented skin lesions.    ____________________________________________    Assessment & Plan:   # Personal history of malignant melanoma in situ, left medial shin, status post Mohs 6/19/2024  # History of junctional dysplastic nevus with moderate to severe atypia, left thigh, biopsied 5/23/2424, status post excision 6/19/2024  # History of basal cell carcinoma, left upper back, status post excision 3/27/2019  # Nevi, trunk and extremities  # Solar lentigines  - No signs of recurrence. Continued observation recommended.   - Nevi demonstrate no concerning features on dermoscopy. We discussed the importance of self exams at home.   - ABCDEs: Counseled ABCDEs of melanoma: Asymmetry, Border (irregularity), Color (not uniform, changes in color), Diameter (greater than 6 mm which is about the size of a pencil eraser), and Evolving (any changes in preexisting moles).  - Sun protection: Counseled SPF 30+ sunscreen, UPF clothing, sun avoidance, tanning bed avoidance.    The patient has 1 small excoriated scabbed papule overlying the melanoma scar on the left medial shin.  However, she recalls bumping this area recently.  I do recommend close continued observation.  Patient should return for reassessment if this lesion does not completely improve and resolve in the next few weeks.    # Cherry angiomas  # Seborrheic keratoses  - We discussed the benign nature of the skin lesions. No treatment required. Continued observation recommended. Follow up with any concerns or changes.      # Wart, right third finger  If bothersome, we discussed cryotherapy in office every 3 to 4 weeks or over-the-counter Mediplast (salicylic acid 40%) at home.  The patient does not express  interest in an office treatment at this time.    Follow-up:  6 months for follow up full body skin exam, as needed for new or changing lesions or new concerns    All risks, benefits and alternatives were discussed with patient.  Patient is in agreement and understands the assessment and plan.  All questions were answered.    Ruthann Pierre PA-C  [facility] Dermatology    CC Referred MD Ankit  No address on file on close of this encounter.

## 2025-06-03 ENCOUNTER — ANCILLARY PROCEDURE (OUTPATIENT)
Dept: ULTRASOUND IMAGING | Facility: CLINIC | Age: 65
End: 2025-06-03
Attending: FAMILY MEDICINE
Payer: COMMERCIAL

## 2025-06-03 DIAGNOSIS — E89.0 S/P PARTIAL THYROIDECTOMY: ICD-10-CM

## 2025-06-03 PROCEDURE — 76536 US EXAM OF HEAD AND NECK: CPT | Mod: TC | Performed by: RADIOLOGY

## 2025-06-04 ENCOUNTER — RESULTS FOLLOW-UP (OUTPATIENT)
Dept: FAMILY MEDICINE | Facility: CLINIC | Age: 65
End: 2025-06-04

## 2025-06-04 ENCOUNTER — OFFICE VISIT (OUTPATIENT)
Dept: OPHTHALMOLOGY | Facility: CLINIC | Age: 65
End: 2025-06-04
Payer: COMMERCIAL

## 2025-06-04 DIAGNOSIS — H40.003 GLAUCOMA SUSPECT OF BOTH EYES: Primary | ICD-10-CM

## 2025-06-04 PROCEDURE — 92083 EXTENDED VISUAL FIELD XM: CPT | Performed by: STUDENT IN AN ORGANIZED HEALTH CARE EDUCATION/TRAINING PROGRAM

## 2025-06-04 PROCEDURE — 92012 INTRM OPH EXAM EST PATIENT: CPT | Performed by: STUDENT IN AN ORGANIZED HEALTH CARE EDUCATION/TRAINING PROGRAM

## 2025-06-04 PROCEDURE — 92133 CPTRZD OPH DX IMG PST SGM ON: CPT | Performed by: STUDENT IN AN ORGANIZED HEALTH CARE EDUCATION/TRAINING PROGRAM

## 2025-06-04 ASSESSMENT — SLIT LAMP EXAM - LIDS
COMMENTS: NORMAL
COMMENTS: NORMAL

## 2025-06-04 ASSESSMENT — VISUAL ACUITY
OS_SC: 20/25
METHOD: SNELLEN - LINEAR
OS_SC+: -2
OD_PH_SC: 20/40
OD_SC: 20/60
OD_SC+: -1

## 2025-06-04 ASSESSMENT — TONOMETRY
IOP_METHOD: APPLANATION
OD_IOP_MMHG: 15
OS_IOP_MMHG: 13

## 2025-06-04 ASSESSMENT — EXTERNAL EXAM - RIGHT EYE: OD_EXAM: NORMAL

## 2025-06-04 ASSESSMENT — EXTERNAL EXAM - LEFT EYE: OS_EXAM: NORMAL

## 2025-06-04 NOTE — LETTER
6/4/2025      Missy Kirkpatrick  [address] [facility] [address]      Dear Colleague,    Thank you for referring your patient, Missy Kirkpatrick, to the [facility]. Please see a copy of my visit note below.     Current Eye Medications:  latanoprost - both eyes QHS - last dose: 11pm last night  Vuity - both eyes PRN pickleball     Subjective:  here for a glaucoma follow-up per Dr. Yousif - tay with using latanoprost nightly. Vuity drops continue to help pt see when needed for pickleball. .      Objective:  See Ophthalmology Exam.       Assessment:  Missy Kirkpatrick is a 64 year old female who presents with:   Encounter Diagnosis   Name Primary?     Glaucoma suspect of both eyes - on drops Intraocular pressure 15/13 today. Continue same medication.    OCT optic nerve: avg retinal nerve fiber layer 55/64; thin S,T,I and stable right eye; thin temp and inf, borderline sup and stable left eye     Sampson visual field (HVF) 24-2: early inf arcuate defect R>L       Plan:  Continue Latanoprost (green top) every evening both eyes     Continue Vuity as needed     Araceli Martinez MD  [phone]        Again, thank you for allowing me to participate in the care of your patient.        Sincerely,        Araceli Martinez MD    Electronically signed

## 2025-06-04 NOTE — PROGRESS NOTES
Current Eye Medications:  latanoprost - both eyes QHS - last dose: 11pm last night  Vuity - both eyes PRN pickleball     Subjective:  here for a glaucoma follow-up per Dr. Yousif - tay with using latanoprost nightly. Vuity drops continue to help pt see when needed for pickleball. .      Objective:  See Ophthalmology Exam.       Assessment:  Missy Kirkpatrick is a 64 year old female who presents with:   Encounter Diagnosis   Name Primary?    Glaucoma suspect of both eyes - on drops Intraocular pressure 15/13 today. Continue same medication.    OCT optic nerve: avg retinal nerve fiber layer 55/64; thin S,T,I and stable right eye; thin temp and inf, borderline sup and stable left eye     Sampson visual field (HVF) 24-2: early inf arcuate defect R>L       Plan:  Continue Latanoprost (green top) every evening both eyes     Continue Vuity as needed     Araceli Martinez MD  [phone]

## 2025-06-04 NOTE — PATIENT INSTRUCTIONS
Continue Latanoprost (green top) every evening both eyes     Continue Vuity as needed     Araceli Martinez MD  [phone]

## 2025-07-14 ENCOUNTER — TELEPHONE (OUTPATIENT)
Dept: ENDOCRINOLOGY | Facility: CLINIC | Age: 65
End: 2025-07-14

## 2025-07-14 ENCOUNTER — VIRTUAL VISIT (OUTPATIENT)
Dept: ENDOCRINOLOGY | Facility: CLINIC | Age: 65
End: 2025-07-14
Payer: COMMERCIAL

## 2025-07-14 VITALS — BODY MASS INDEX: 33.74 KG/M2 | HEIGHT: 67 IN | WEIGHT: 215 LBS

## 2025-07-14 DIAGNOSIS — E66.09 CLASS 2 OBESITY DUE TO EXCESS CALORIES WITHOUT SERIOUS COMORBIDITY WITH BODY MASS INDEX (BMI) OF 35.0 TO 35.9 IN ADULT: ICD-10-CM

## 2025-07-14 DIAGNOSIS — E66.812 CLASS 2 OBESITY DUE TO EXCESS CALORIES WITHOUT SERIOUS COMORBIDITY WITH BODY MASS INDEX (BMI) OF 35.0 TO 35.9 IN ADULT: ICD-10-CM

## 2025-07-14 DIAGNOSIS — E89.0 S/P PARTIAL THYROIDECTOMY: ICD-10-CM

## 2025-07-14 DIAGNOSIS — E04.2 MULTIPLE THYROID NODULES: Primary | ICD-10-CM

## 2025-07-14 PROCEDURE — 1126F AMNT PAIN NOTED NONE PRSNT: CPT | Mod: 95 | Performed by: STUDENT IN AN ORGANIZED HEALTH CARE EDUCATION/TRAINING PROGRAM

## 2025-07-14 PROCEDURE — G2211 COMPLEX E/M VISIT ADD ON: HCPCS | Performed by: STUDENT IN AN ORGANIZED HEALTH CARE EDUCATION/TRAINING PROGRAM

## 2025-07-14 PROCEDURE — 98005 SYNCH AUDIO-VIDEO EST LOW 20: CPT | Performed by: STUDENT IN AN ORGANIZED HEALTH CARE EDUCATION/TRAINING PROGRAM

## 2025-07-14 RX ORDER — PHENTERMINE HYDROCHLORIDE 15 MG/1
15 CAPSULE ORAL EVERY MORNING
Qty: 90 CAPSULE | Refills: 0 | Status: SHIPPED | OUTPATIENT
Start: 2025-07-14

## 2025-07-14 ASSESSMENT — PAIN SCALES - GENERAL: PAINLEVEL_OUTOF10: NO PAIN (0)

## 2025-07-14 NOTE — TELEPHONE ENCOUNTER
7/14/2025 2:04PM  Left Voicemail (1st Attempt) and Sent Mychart (1st Attempt) for the patient to call back and schedule the following:    Appointment type: Return Endocrinology  Provider: Dr. Zamorano  Return date: 1 year follow-up (around 7/14/2026)  Specialty phone number: [phone]  Additional appointment(s) needed: NA  Additonal Notes: 7/14 LVM and MYC for pt to schedule Return Endo w/ Dr. Zamorano July 2026. MJ  Disposition: Return in about 1 year (around 7/14/2026).  [CER [id number]]    [facility]   Rheumatology, Gastroenterology, Nephrology, Infectious Disease, Pulmonology  [facility]

## 2025-07-14 NOTE — PROGRESS NOTES
Arterial Line Insertion  Performed by: Lorenzo Joaquin CRNA  Authorized by: Brianda Colindres DO   Consent: Verbal consent obtained  Written consent obtained  Risks and benefits: risks, benefits and alternatives were discussed  Consent given by: patient  Patient understanding: patient states understanding of the procedure being performed  Patient consent: the patient's understanding of the procedure matches consent given  Procedure consent: procedure consent matches procedure scheduled  Relevant documents: relevant documents present and verified  Required items: required blood products, implants, devices, and special equipment available  Patient identity confirmed: arm band  Preparation: Patient was prepped and draped in the usual sterile fashion    Indications: hemodynamic monitoring  Orientation:  Left  Location: radial artery  Sedation:  Patient sedated: yes    Procedure Details:  Marcio's test normal: yes  Needle gauge: 20  Seldinger technique: Seldinger technique used  Number of attempts: 3    Post-procedure:  Post-procedure: chlorhexidine patch applied and dressing applied  Waveform: good waveform and waveform confirmed  Post-procedure CNS: normal  Patient tolerance: patient tolerated the procedure well with no immediate complications Endocrinology Clinic Visit       Video-Visit Details    Type of service:  Video Visit  Joined the call at 7/14/2025, 9:06:51 am.  Left the call at 7/14/2025, 9:16:26 am.    Originating Location (pt. Location): Home        Distant Location (provider location):  Off-site    Mode of Communication:  Video Conference via [employer]    Physician has received verbal consent for a Video Visit from the patient? Yes    I spent a total of 20 minutes on the date of encounter reviewing medical records, evaluating the patient, coordinating care and documenting in the EHR, as detailed above.      NAME:  Missy Kirkpatrick  PCP:  Shell Osuna  MRN:  [id number]  Reason for Consult: Thyroid nodules  Requesting Provider:  Shell Osuna    Chief Complaint     Chief Complaint   Patient presents with    RECHECK       History of Present Illness     Missy Kirkpatrick is a 63 year old female who is seen in video visit for thyroid nodule. I last saw her 9/2023.    She has hx of left partial thyroidectomy 26 years ago. She said at that time her TSH was off, thyroid uptake and scan along with US were done. FNA of nodule was done and she was told it is precancerous. She underwent partial left thyroidectomy since then she had no further thyroid US, TSH has been normal and did not need to take lt4 replacement. She is not sure what was the pathology result of the left lobectomy, but she was never told she has thyroid cancer. She does not remember where the work up and surgery done.    She had thyroid US 4/2023 done per PCP for follow up. She never felt any localized neck symptoms. I reviewed the images with her. 2 small nodules, superior nodule is subcentemetric. Mid right nodule is 1.2 cm by biggest dimension, mostly isoechoic.      She denied any hx of radiation.  She denied any family hx of thyroid cancer. She reported 2 brothers with pancreatic cancer, one with colon cancer, another one with lung cancer.      Interval hx: no  new concerns or symptoms. Doing well.  Thyroid US 6/30/25: showed stable sized right thyroid nodules. Mid thyroid nodule changed in echogenicity and looks hypoechoic now.      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     Problem List     Patient Active Problem List   Diagnosis    CARDIOVASCULAR SCREENING; LDL GOAL LESS THAN 160    Female stress incontinence    Factor 5 Leiden mutation, heterozygous    Multiple thyroid nodules    S/P partial thyroidectomy    Right rotator cuff tendonitis    Complete tear of right rotator cuff    Melanoma in situ of left lower leg (H)        Medications     Current Outpatient Medications   Medication Sig Dispense Refill    latanoprost (XALATAN) 0.005 % ophthalmic solution Place 1 drop into both eyes at bedtime. 7.5 mL 3    MULTIVITAMIN TABS   OR 1 TABLET DAILY      phentermine 15 MG capsule Take 1 capsule (15 mg) by mouth every morning. 90 capsule 0    Pilocarpine HCl (VUITY) 1.25 % SOLN Apply 1 drop to eye daily. 5 mL 11    tretinoin (RETIN-A) 0.05 % external cream Spread a pea size amount into affected area topically at bedtime.  Use sunscreen SPF>20. 45 g 3     No current facility-administered medications for this visit.        Allergies     No Known Allergies    Medical / Surgical History     Past Medical History:   Diagnosis Date    Arthritis     Basal cell carcinoma     Factor 5 Leiden mutation, heterozygous     Factor 5 Leiden mutation, heterozygous     Factor 5 Leiden mutation, heterozygous     Glaucoma (increased eye pressure)     Hyperlipidemia LDL goal < 100     Malignant melanoma (H)     Morbid obesity (H)     Thyroid disease 02/1994    Precancerous Thyroid - 1/2 removed     Past Surgical History:   Procedure Laterality Date    ABDOMEN SURGERY  December 2012    Hysterectomy    APPENDECTOMY      BIOPSY  November 2012    utererus    COLONOSCOPY  October 2012    ENT SURGERY  February 1994    Thyroid issue    ORTHOPEDIC SURGERY  01/1976    [place]    TUBAL  LIGATION      [facility] ANESTH,CLEFT PALATE REPAIR      [facility] ANESTH,ELBOW,NOS  01/76    Z REMOVE BENIGN THYROID LESION  01/2002    nodule removed     [facility] TOTAL ABDOM HYSTERECTOMY  12/14/2012    pathology benign, no further paps needed       Social History     Social History     Socioeconomic History    Marital status:      Spouse name: Not on file    Number of children: Not on file    Years of education: Not on file    Highest education level: Not on file   Occupational History    Not on file   Tobacco Use    Smoking status: Never    Smokeless tobacco: Never    Tobacco comments:     Nonsmoking household   Vaping Use    Vaping status: Never Used   Substance and Sexual Activity    Alcohol use: Yes     Comment: Occasionally    Drug use: No    Sexual activity: Yes     Partners: Male     Birth control/protection: Female Surgical     Comment: tubal ligation   Other Topics Concern    Parent/sibling w/ CABG, MI or angioplasty before 65F 55M? Yes     Comment: Brother   Social History Narrative    Not on file     Social Drivers of Health     Financial Resource Strain: Low Risk  (5/27/2025)    Financial Resource Strain     Within the past 12 months, have you or your family members you live with been unable to get utilities (heat, electricity) when it was really needed?: No   Food Insecurity: Low Risk  (5/27/2025)    Food Insecurity     Within the past 12 months, did you worry that your food would run out before you got money to buy more?: No     Within the past 12 months, did the food you bought just not last and you didn t have money to get more?: No   Transportation Needs: Low Risk  (5/27/2025)    Transportation Needs     Within the past 12 months, has lack of transportation kept you from medical appointments, getting your medicines, non-medical meetings or appointments, work, or from getting things that you need?: No   Physical Activity: Sufficiently Active (5/27/2025)    Exercise Vital Sign     Days of Exercise per Week:  5 days     Minutes of Exercise per Session: 60 min   Stress: No Stress Concern Present (5/27/2025)    [employer] [place] [employer] - Occupational Stress Questionnaire     Feeling of Stress : Only a little   Social Connections: Unknown (5/27/2025)    Social Connection and Isolation Panel [NHANES]     Frequency of Communication with Friends and Family: Not on file     Frequency of Social Gatherings with Friends and Family: More than three times a week     Attends [employer] Services: Not on file     Active Member of Clubs or Organizations: Not on file     Attends Club or Organization Meetings: Not on file     Marital Status: Not on file   Interpersonal Safety: Low Risk  (5/28/2025)    Interpersonal Safety     Do you feel physically and emotionally safe where you currently live?: Yes     Within the past 12 months, have you been hit, slapped, kicked or otherwise physically hurt by someone?: No     Within the past 12 months, have you been humiliated or emotionally abused in other ways by your partner or ex-partner?: No   Housing Stability: Low Risk  (5/27/2025)    Housing Stability     Do you have housing? : Yes     Are you worried about losing your housing?: No       Family History     Family History   Problem Relation Age of Onset    Cerebrovascular Disease Mother     Eye Disorder Mother         glaucoma    Heart Disease Mother     Diabetes Mother         blindness    Hypertension Mother     Eye Surgery Mother         cataracts    Cataracts Mother     Other Cancer Brother         Lung Cancer    Diabetes Brother     Lung Cancer Brother     Blood Disease Brother     Obesity Brother         Bariatric surgery    Blood Disease Brother     Diabetes Brother     Blood Disease Brother     Colon Cancer Brother     Rectal Cancer Brother     Other Cancer Brother         Pancreatic Cancer    Hypertension Brother     Other Cancer Brother         Pancreatic Cancer    Pancreatic Cancer Brother     Blood Disease Brother      "Blood Disease Son 27        blood clots    Osteoporosis Son         July 2020    Diabetes Brother     Other Cancer Brother         Lung Cancer    Obesity Brother         Bariatric surgery    Melanoma No family hx of     Glaucoma No family hx of     Macular Degeneration No family hx of        ROS     12 ROS completed, pertinent positive and negative in HPI    Physical Exam   Ht 1.702 m (5' 7\")   Wt 97.5 kg (215 lb)   LMP 10/19/2012   BMI 33.67 kg/m     GENERAL: Healthy, alert and no distress  EYES: Eyes grossly normal to inspection.  No discharge or erythema, or obvious scleral/conjunctival abnormalities.  RESP: No audible wheeze, cough, or visible cyanosis.  No visible retractions or increased work of breathing.    SKIN: Visible skin clear. No significant rash, abnormal pigmentation or lesions.  NEURO: Cranial nerves grossly intact.  Mentation and speech appropriate for age.  PSYCH: Mentation appears normal, affect normal/bright, judgement and insight intact, normal speech and appearance well-groomed.     Labs/Imaging     Pertinent Labs were reviewed and updated in EPIC and discussed briefly.  Radiology Results were  reviewed and updated in EPIC and discussed in details.    Summary of recent findings:   No results found for: A1C    TSH   Date Value Ref Range Status   05/28/2025 0.93 0.30 - 4.20 uIU/mL Final   05/28/2024 0.89 0.30 - 4.20 uIU/mL Final   01/24/2023 0.94 0.40 - 4.00 mU/L Final   01/10/2022 0.60 0.40 - 4.00 mU/L Final   07/23/2020 0.98 0.40 - 4.00 mU/L Final   11/14/2017 0.86 0.40 - 4.00 mU/L Final   09/30/2014 0.61 0.40 - 4.00 mU/L Final     Comment:     Effective 7/30/2014, the reference range for this assay has changed to reflect   new instrumentation/methodology.     11/27/2012 1.31 0.4 - 5.0 mU/L Final   09/30/2010 1.09 0.4 - 5.0 mU/L Final     T4 Free   Date Value Ref Range Status   09/30/2010 1.06 0.70 - 1.85 ng/dL Final       Creatinine   Date Value Ref Range Status   10/09/2009 0.91 0.52 - " "1.04 mg/dL Final     Comment:     New IDMS-traceable calibration  beginning 5/1/08       Recent Labs   Lab Test 01/24/23  1000 01/10/22  1645 12/26/18  0940 10/21/15  0823   CHOL 239* 224*   < > 208*   HDL 78 56   < > 66   * 126*   < > 126   TRIG 128 211*   < > 80   CHOLHDLRATIO  --   --   --  3.2    < > = values in this interval not displayed.       No results found for: \"[id number]\", \"[id number]\", \"[id number]\"    I personally reviewed the patient's outside records from [facility] EMR and Care Everywhere. Summary of pertinent findings in HPI.    Impression / Plan     1. MNG   2. Hx of of left thyroidectomy back in the 90s    We reviewed her thyroid US images. We discussed the nature of thyroid nodules, most are benign.  She has 2 small right thyroid nodules.  Superior 1 is very small, subcentimeter size.  Mid thyroid lobe nodule is 1.3 cm in largest dimension, was isoechoic on US 2023. Most recent US 6/2025 stable size but looks hypoechoic.  We again reviewed the ELIZABETH recommendation for biopsy. Right mid thyroid nodule is now intermediate risk for cancer but do no meet criteria for FNA by size; it is also reassuring that it remained stable in size. Recommend repeat US in 1 year.      Test and/or medications prescribed today:  Orders Placed This Encounter   Procedures    US Thyroid         Follow up: 1 year    The longitudinal plan of care for the diagnosis(es)/condition(s) as documented were addressed during this visit. Due to the added complexity in care, I will continue to support Missy in the subsequent management and with ongoing continuity of care.       Carley Zamorano MD  Endocrinology, Diabetes and Metabolism  [facility]  "

## 2025-07-14 NOTE — LETTER
7/14/2025      Missy Kirkpatrick  [address]  [facility] [address]      Dear Colleague,    Thank you for referring your patient, Missy Kirkpatrick, to the [facility]. Please see a copy of my visit note below.    Endocrinology Clinic Visit       Video-Visit Details    Type of service:  Video Visit  Joined the call at 7/14/2025, 9:06:51 am.  Left the call at 7/14/2025, 9:16:26 am.    Originating Location (pt. Location): Home        Distant Location (provider location):  Off-site    Mode of Communication:  Video Conference via [facility]    Physician has received verbal consent for a Video Visit from the patient? Yes    I spent a total of 20 minutes on the date of encounter reviewing medical records, evaluating the patient, coordinating care and documenting in the EHR, as detailed above.      NAME:  Missy Kirkpatrick  PCP:  Shell Osuna  MRN:  [id number]  Reason for Consult: Thyroid nodules  Requesting Provider:  Shell Osuna    Chief Complaint     Chief Complaint   Patient presents with     RECHECK       History of Present Illness     Missy Kirkpatrick is a 63 year old female who is seen in video visit for thyroid nodule. I last saw her 9/2023.    She has hx of left partial thyroidectomy 26 years ago. She said at that time her TSH was off, thyroid uptake and scan along with US were done. FNA of nodule was done and she was told it is precancerous. She underwent partial left thyroidectomy since then she had no further thyroid US, TSH has been normal and did not need to take lt4 replacement. She is not sure what was the pathology result of the left lobectomy, but she was never told she has thyroid cancer. She does not remember where the work up and surgery done.    She had thyroid US 4/2023 done per PCP for follow up. She never felt any localized neck symptoms. I reviewed the images with her. 2 small nodules, superior nodule is subcentemetric. Mid right nodule is  1.2 cm by biggest dimension, mostly isoechoic.      She denied any hx of radiation.  She denied any family hx of thyroid cancer. She reported 2 brothers with pancreatic cancer, one with colon cancer, another one with lung cancer.      Interval hx: no new concerns or symptoms. Doing well.  Thyroid US 6/30/25: showed stable sized right thyroid nodules. Mid thyroid nodule changed in echogenicity and looks hypoechoic now.      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     Problem List     Patient Active Problem List   Diagnosis     CARDIOVASCULAR SCREENING; LDL GOAL LESS THAN 160     Female stress incontinence     Factor 5 Leiden mutation, heterozygous     Multiple thyroid nodules     S/P partial thyroidectomy     Right rotator cuff tendonitis     Complete tear of right rotator cuff     Melanoma in situ of left lower leg (H)        Medications     Current Outpatient Medications   Medication Sig Dispense Refill     latanoprost (XALATAN) 0.005 % ophthalmic solution Place 1 drop into both eyes at bedtime. 7.5 mL 3     MULTIVITAMIN TABS   OR 1 TABLET DAILY       phentermine 15 MG capsule Take 1 capsule (15 mg) by mouth every morning. 90 capsule 0     Pilocarpine HCl (VUITY) 1.25 % SOLN Apply 1 drop to eye daily. 5 mL 11     tretinoin (RETIN-A) 0.05 % external cream Spread a pea size amount into affected area topically at bedtime.  Use sunscreen SPF>20. 45 g 3     No current facility-administered medications for this visit.        Allergies     No Known Allergies    Medical / Surgical History     Past Medical History:   Diagnosis Date     Arthritis      Basal cell carcinoma      Factor 5 Leiden mutation, heterozygous      Factor 5 Leiden mutation, heterozygous      Factor 5 Leiden mutation, heterozygous      Glaucoma (increased eye pressure)      Hyperlipidemia LDL goal < 100      Malignant melanoma (H)      Morbid obesity (H)      Thyroid disease 02/1994    Precancerous Thyroid - 1/2 removed     Past Surgical  History:   Procedure Laterality Date     ABDOMEN SURGERY  December 2012    Hysterectomy     APPENDECTOMY       BIOPSY  November 2012    utererus     COLONOSCOPY  October 2012     ENT SURGERY  February 1994    Thyroid issue     ORTHOPEDIC SURGERY  01/1976    [place]     TUBAL LIGATION       [facility] ANESTH,CLEFT PALATE REPAIR       [facility] ANESTH,ELBOW,NOS  01/76     [facility] REMOVE BENIGN THYROID LESION  01/2002    nodule removed      [facility] TOTAL ABDOM HYSTERECTOMY  12/14/2012    pathology benign, no further paps needed       Social History     Social History     Socioeconomic History     Marital status:      Spouse name: Not on file     Number of children: Not on file     Years of education: Not on file     Highest education level: Not on file   Occupational History     Not on file   Tobacco Use     Smoking status: Never     Smokeless tobacco: Never     Tobacco comments:     Nonsmoking household   Vaping Use     Vaping status: Never Used   Substance and Sexual Activity     Alcohol use: Yes     Comment: Occasionally     Drug use: No     Sexual activity: Yes     Partners: Male     Birth control/protection: Female Surgical     Comment: tubal ligation   Other Topics Concern     Parent/sibling w/ CABG, MI or angioplasty before 65F 55M? Yes     Comment: Brother   Social History Narrative     Not on file     Social Drivers of Health     Financial Resource Strain: Low Risk  (5/27/2025)    Financial Resource Strain      Within the past 12 months, have you or your family members you live with been unable to get utilities (heat, electricity) when it was really needed?: No   Food Insecurity: Low Risk  (5/27/2025)    Food Insecurity      Within the past 12 months, did you worry that your food would run out before you got money to buy more?: No      Within the past 12 months, did the food you bought just not last and you didn t have money to get more?: No   Transportation Needs: Low Risk  (5/27/2025)    Transportation Needs       Within the past 12 months, has lack of transportation kept you from medical appointments, getting your medicines, non-medical meetings or appointments, work, or from getting things that you need?: No   Physical Activity: Sufficiently Active (5/27/2025)    Exercise Vital Sign      Days of Exercise per Week: 5 days      Minutes of Exercise per Session: 60 min   Stress: No Stress Concern Present (5/27/2025)    [employer] - Occupational Stress Questionnaire      Feeling of Stress : Only a little   Social Connections: Unknown (5/27/2025)    Social Connection and Isolation Panel [NHANES]      Frequency of Communication with Friends and Family: Not on file      Frequency of Social Gatherings with Friends and Family: More than three times a week      Attends Oriental orthodox Services: Not on file      Active Member of Clubs or Organizations: Not on file      Attends Club or Organization Meetings: Not on file      Marital Status: Not on file   Interpersonal Safety: Low Risk  (5/28/2025)    Interpersonal Safety      Do you feel physically and emotionally safe where you currently live?: Yes      Within the past 12 months, have you been hit, slapped, kicked or otherwise physically hurt by someone?: No      Within the past 12 months, have you been humiliated or emotionally abused in other ways by your partner or ex-partner?: No   Housing Stability: Low Risk  (5/27/2025)    Housing Stability      Do you have housing? : Yes      Are you worried about losing your housing?: No       Family History     Family History   Problem Relation Age of Onset     Cerebrovascular Disease Mother      Eye Disorder Mother         glaucoma     Heart Disease Mother      Diabetes Mother         blindness     Hypertension Mother      Eye Surgery Mother         cataracts     Cataracts Mother      Other Cancer Brother         Lung Cancer     Diabetes Brother      Lung Cancer Brother      Blood Disease Brother      Obesity Brother   "       Bariatric surgery     Blood Disease Brother      Diabetes Brother      Blood Disease Brother      Colon Cancer Brother      Rectal Cancer Brother      Other Cancer Brother         Pancreatic Cancer     Hypertension Brother      Other Cancer Brother         Pancreatic Cancer     Pancreatic Cancer Brother      Blood Disease Brother      Blood Disease Son 27        blood clots     Osteoporosis Son         July 2020     Diabetes Brother      Other Cancer Brother         Lung Cancer     Obesity Brother         Bariatric surgery     Melanoma No family hx of      Glaucoma No family hx of      Macular Degeneration No family hx of        ROS     12 ROS completed, pertinent positive and negative in HPI    Physical Exam   Ht 1.702 m (5' 7\")   Wt 97.5 kg (215 lb)   LMP 10/19/2012   BMI 33.67 kg/m     GENERAL: Healthy, alert and no distress  EYES: Eyes grossly normal to inspection.  No discharge or erythema, or obvious scleral/conjunctival abnormalities.  RESP: No audible wheeze, cough, or visible cyanosis.  No visible retractions or increased work of breathing.    SKIN: Visible skin clear. No significant rash, abnormal pigmentation or lesions.  NEURO: Cranial nerves grossly intact.  Mentation and speech appropriate for age.  PSYCH: Mentation appears normal, affect normal/bright, judgement and insight intact, normal speech and appearance well-groomed.     Labs/Imaging     Pertinent Labs were reviewed and updated in EPIC and discussed briefly.  Radiology Results were  reviewed and updated in EPIC and discussed in details.    Summary of recent findings:   No results found for: A1C    TSH   Date Value Ref Range Status   05/28/2025 0.93 0.30 - 4.20 uIU/mL Final   05/28/2024 0.89 0.30 - 4.20 uIU/mL Final   01/24/2023 0.94 0.40 - 4.00 mU/L Final   01/10/2022 0.60 0.40 - 4.00 mU/L Final   07/23/2020 0.98 0.40 - 4.00 mU/L Final   11/14/2017 0.86 0.40 - 4.00 mU/L Final   09/30/2014 0.61 0.40 - 4.00 mU/L Final     Comment:     " "Effective 7/30/2014, the reference range for this assay has changed to reflect   new instrumentation/methodology.     11/27/2012 1.31 0.4 - 5.0 mU/L Final   09/30/2010 1.09 0.4 - 5.0 mU/L Final     T4 Free   Date Value Ref Range Status   09/30/2010 1.06 0.70 - 1.85 ng/dL Final       Creatinine   Date Value Ref Range Status   10/09/2009 0.91 0.52 - 1.04 mg/dL Final     Comment:     New IDMS-traceable calibration  beginning 5/1/08       Recent Labs   Lab Test 01/24/23  1000 01/10/22  1645 12/26/18  0940 10/21/15  0823   CHOL 239* 224*   < > 208*   HDL 78 56   < > 66   * 126*   < > 126   TRIG 128 211*   < > 80   CHOLHDLRATIO  --   --   --  3.2    < > = values in this interval not displayed.       No results found for: \"[id number]\", \"[id number]\", \"[id number]\"    I personally reviewed the patient's outside records from [facility]MR and Care Everywhere. Summary of pertinent findings in HPI.    Impression / Plan     1. MNG   2. Hx of of left thyroidectomy back in the 90s    We reviewed her thyroid US images. We discussed the nature of thyroid nodules, most are benign.  She has 2 small right thyroid nodules.  Superior 1 is very small, subcentimeter size.  Mid thyroid lobe nodule is 1.3 cm in largest dimension, was isoechoic on US 2023. Most recent US 6/2025 stable size but looks hypoechoic.  We again reviewed the [facility]ETH recommendation for biopsy. Right mid thyroid nodule is now intermediate risk for cancer but do no meet criteria for FNA by size; it is also reassuring that it remained stable in size. Recommend repeat US in 1 year.      Test and/or medications prescribed today:  Orders Placed This Encounter   Procedures     US Thyroid         Follow up: 1 year    The longitudinal plan of care for the diagnosis(es)/condition(s) as documented were addressed during this visit. Due to the added complexity in care, I will continue to support Missy in the subsequent management and with ongoing continuity of care.       Hind " MD Jaun  Endocrinology, Diabetes and Metabolism  [facility]      Again, thank you for allowing me to participate in the care of your patient.        Sincerely,        Carley Zamorano MD    Electronically signed

## 2025-07-14 NOTE — NURSING NOTE
Current patient location: [address] [facility] [address]    Is the patient currently in the state of [place]? YES    Visit mode: VIDEO    If the visit is dropped, the patient can be reconnected by:VIDEO VISIT: Send to e-mail at: [email or web address]    Will anyone else be joining the visit? NO  (If patient encounters technical issues they should call [phone]    Are changes needed to the allergy or medication list? No    Are refills needed on medications prescribed by this physician? NO    Rooming Documentation:  Not applicable    Reason for visit: RECHECK    Tiffanie SCHROEDERF

## 2025-07-15 DIAGNOSIS — H40.003 GLAUCOMA SUSPECT OF BOTH EYES: ICD-10-CM

## 2025-07-15 RX ORDER — LATANOPROST 50 UG/ML
1 SOLUTION/ DROPS OPHTHALMIC AT BEDTIME
Qty: 7.5 ML | Refills: 3 | Status: SHIPPED | OUTPATIENT
Start: 2025-07-15

## 2025-07-15 NOTE — TELEPHONE ENCOUNTER
Refilling drops per Dr. Martinez's last visit note 6/4/24.  Continue Latanoprost (green top) every evening both eyes